# Patient Record
Sex: FEMALE | Race: WHITE | Employment: OTHER | URBAN - METROPOLITAN AREA
[De-identification: names, ages, dates, MRNs, and addresses within clinical notes are randomized per-mention and may not be internally consistent; named-entity substitution may affect disease eponyms.]

---

## 2017-09-17 PROBLEM — E03.9 HYPOTHYROIDISM: Chronic | Status: ACTIVE | Noted: 2017-09-17

## 2017-09-17 PROBLEM — I10 HTN (HYPERTENSION): Chronic | Status: ACTIVE | Noted: 2017-09-17

## 2017-09-17 PROBLEM — R79.89 ELEVATED TROPONIN: Status: ACTIVE | Noted: 2017-09-17

## 2017-09-17 PROBLEM — R77.8 ELEVATED TROPONIN: Status: ACTIVE | Noted: 2017-09-17

## 2017-09-17 PROBLEM — R79.89 ELEVATED BRAIN NATRIURETIC PEPTIDE (BNP) LEVEL: Status: ACTIVE | Noted: 2017-09-17

## 2017-09-17 PROBLEM — R50.9 FEVER: Status: ACTIVE | Noted: 2017-09-17

## 2017-09-17 PROBLEM — R10.811 RUQ ABDOMINAL TENDERNESS: Status: ACTIVE | Noted: 2017-09-17

## 2017-09-17 PROBLEM — R65.10 SIRS (SYSTEMIC INFLAMMATORY RESPONSE SYNDROME) (HCC): Status: ACTIVE | Noted: 2017-09-17

## 2017-09-17 PROBLEM — R11.2 NAUSEA AND VOMITING: Status: ACTIVE | Noted: 2017-09-17

## 2017-10-25 PROBLEM — I50.42 CHRONIC COMBINED SYSTOLIC AND DIASTOLIC HF (HEART FAILURE) (HCC): Status: ACTIVE | Noted: 2017-10-25

## 2017-10-25 PROBLEM — I42.8 NONISCHEMIC CARDIOMYOPATHY (HCC): Status: ACTIVE | Noted: 2017-10-25

## 2018-02-05 PROBLEM — E66.8 MODERATE OBESITY: Status: ACTIVE | Noted: 2018-02-05

## 2018-02-05 PROBLEM — E66.9 MODERATE OBESITY: Status: ACTIVE | Noted: 2018-02-05

## 2018-03-14 ENCOUNTER — HOSPITAL ENCOUNTER (OUTPATIENT)
Dept: CARDIOLOGY | Age: 78
Discharge: HOME | End: 2018-03-14
Admitting: INTERNAL MEDICINE
Payer: MEDICARE

## 2018-03-14 LAB
LV EF: 55 %
LVEF MODALITY: NORMAL

## 2018-03-14 PROCEDURE — 93306 TTE W/DOPPLER COMPLETE: CPT

## 2018-03-15 ENCOUNTER — TELEPHONE (OUTPATIENT)
Dept: CARDIOLOGY CLINIC | Age: 78
End: 2018-03-15

## 2018-03-23 ENCOUNTER — HOSPITAL ENCOUNTER (EMERGENCY)
Age: 78
Discharge: HOME | End: 2018-03-24
Attending: EMERGENCY MEDICINE
Payer: MEDICARE

## 2018-03-23 VITALS
RESPIRATION RATE: 18 BRPM | HEART RATE: 86 BPM | DIASTOLIC BLOOD PRESSURE: 104 MMHG | OXYGEN SATURATION: 97 % | SYSTOLIC BLOOD PRESSURE: 150 MMHG | TEMPERATURE: 98.2 F

## 2018-03-23 DIAGNOSIS — R10.30 LOWER ABDOMINAL PAIN: Primary | ICD-10-CM

## 2018-03-23 PROCEDURE — 6360000002 HC RX W HCPCS: Performed by: EMERGENCY MEDICINE

## 2018-03-23 PROCEDURE — 99283 EMERGENCY DEPT VISIT LOW MDM: CPT

## 2018-03-23 PROCEDURE — 96372 THER/PROPH/DIAG INJ SC/IM: CPT

## 2018-03-23 RX ORDER — MORPHINE SULFATE 2 MG/ML
2 INJECTION, SOLUTION INTRAMUSCULAR; INTRAVENOUS ONCE
Status: COMPLETED | OUTPATIENT
Start: 2018-03-23 | End: 2018-03-23

## 2018-03-23 RX ORDER — OXYCODONE HYDROCHLORIDE AND ACETAMINOPHEN 5; 325 MG/1; MG/1
1 TABLET ORAL EVERY 6 HOURS PRN
Qty: 5 TABLET | Refills: 0 | Status: SHIPPED | OUTPATIENT
Start: 2018-03-23 | End: 2018-03-26

## 2018-03-23 RX ORDER — ONDANSETRON 4 MG/1
4 TABLET, FILM COATED ORAL EVERY 8 HOURS PRN
Qty: 12 TABLET | Refills: 0 | Status: SHIPPED | OUTPATIENT
Start: 2018-03-23 | End: 2018-03-28

## 2018-03-23 RX ADMIN — MORPHINE SULFATE 2 MG: 2 INJECTION, SOLUTION INTRAMUSCULAR; INTRAVENOUS at 23:03

## 2018-03-23 ASSESSMENT — PAIN SCALES - GENERAL
PAINLEVEL_OUTOF10: 9
PAINLEVEL_OUTOF10: 9
PAINLEVEL_OUTOF10: 2

## 2018-03-23 ASSESSMENT — PAIN DESCRIPTION - DESCRIPTORS: DESCRIPTORS: PRESSURE

## 2018-03-23 ASSESSMENT — PAIN DESCRIPTION - LOCATION: LOCATION: PELVIS

## 2018-03-23 ASSESSMENT — PAIN DESCRIPTION - FREQUENCY: FREQUENCY: CONTINUOUS

## 2018-03-23 ASSESSMENT — PAIN DESCRIPTION - PAIN TYPE: TYPE: ACUTE PAIN

## 2018-03-24 NOTE — ED NOTES
Bed: 11  Expected date:   Expected time:   Means of arrival:   Comments:  Marti Izquierdoo sotero tai hold       Katharine Cortes RN  03/23/18 [id number]

## 2018-03-24 NOTE — ED PROVIDER NOTES
HPI:   Emma Owens is a 76 y.o. female presenting to the ED for post op problem, beginning 0930 am.  The complaint has been intermittent, moderate in severity, and worsened by nothing. Pt states she had a bladder sling put on for stress incontinence. Pt reports she was at home and tried to pull herself off of a low couch using a rocking motion with her body and she felt a pull on her bladder area. Pt denies fever, chills, nausea, vomiting, diarrhea, LOC, SOB, chest pain, abdominal pain, cough. ROS:   Pertinent positives and negatives are stated within HPI, all other systems reviewed and are negative.    --------------------------------------------- PAST HISTORY ---------------------------------------------  Past Medical History:  has a past medical history of History of TIA (transient ischemic attack); Hypertension; and Thyroid disease. Past Surgical History:  has a past surgical history that includes Thyroid lobectomy and knee surgery. Social History:  reports that she quit smoking about 11 years ago. Her smoking use included Cigarettes. She has a 40.00 pack-year smoking history. She has never used smokeless tobacco. She reports that she drinks alcohol. She reports that she does not use drugs. Family History: family history is not on file. The patients home medications have been reviewed. Allergies: Macrobid [nitrofurantoin monohyd macro]; Macrobid [nitrofurantoin]; Ciprofloxacin; Iodine; and Penicillins    -------------------------------------------------- RESULTS -------------------------------------------------  All laboratory and radiology results have been personally reviewed by myself   LABS:  No results found for this visit on 03/23/18. RADIOLOGY:  Interpreted by Radiologist.  No orders to display       ------------------------- NURSING NOTES AND VITALS REVIEWED ---------------------------   The nursing notes within the ED encounter and vital signs as below have been reviewed. /85   Pulse 85   Temp 98.2 °F (36.8 °C) (Oral)   Resp 18   SpO2 90%   Oxygen Saturation Interpretation: Abnormal    ---------------------------------------------------PHYSICAL EXAM--------------------------------------    Constitutional/General: Alert and oriented x3, well appearing, non toxic in NAD  Head: NC/AT  Eyes: PERRL, EOMI  Mouth: Oropharynx clear, handling secretions, no trismus  Neck: Supple, full ROM,   Bladder: 2 laparoscopic incisions in the suprapubic area, no redness or drainage. Pulmonary: Lungs clear to auscultation bilaterally, no wheezes, rales, or rhonchi. Not in respiratory distress  Cardiovascular:  Regular rate and rhythm, no murmurs, gallops, or rubs. 2+ distal pulses  Abdomen: Soft, non tender, non distended,   Extremities: Moves all extremities x 4. Warm and well perfused  Skin: warm and dry without rash  Neurologic: GCS 15,  Psych: Normal Affect      ------------------------------ ED COURSE/MEDICAL DECISION MAKING----------------------  Medications - No data to display    Medical Decision Making:    Pt presents to ED for post op problem. Pt will see Dr. Sharonda Woodruff in a follow up we consulted him and advised no labs or imaging needed. Counseling: The emergency provider has spoken with the patient and discussed todays results, in addition to providing specific details for the plan of care and counseling regarding the diagnosis and prognosis. Questions are answered at this time and they are agreeable with the plan. Time: 11:35PM.   Spoke with Dr. Zion Montemayor (Urology). Discussed case. They will see this patient in follow-up.    --------------------------------- IMPRESSION AND DISPOSITION ---------------------------------    IMPRESSION  No diagnosis found.     DISPOSITION  Disposition: Discharge to home  Patient condition is fair    3/23/18, 10:21 PM.    This note is prepared by Luci Nash, acting as Scribe for Mireya Zeng MD.    Mireya Zeng MD:  The cass's documentation has been prepared under my direction and personally reviewed by me in its entirety. I confirm that the note above accurately reflects all work, treatment, procedures, and medical decision making performed by me.            Noe Salmeron MD  03/23/18 9901

## 2018-04-12 PROBLEM — R79.89 ELEVATED TROPONIN: Status: RESOLVED | Noted: 2017-09-17 | Resolved: 2018-04-12

## 2018-04-12 PROBLEM — R77.8 ELEVATED TROPONIN: Status: RESOLVED | Noted: 2017-09-17 | Resolved: 2018-04-12

## 2019-10-09 ENCOUNTER — TELEPHONE (OUTPATIENT)
Dept: ADMINISTRATIVE | Age: 79
End: 2019-10-09

## 2019-10-22 ENCOUNTER — OFFICE VISIT (OUTPATIENT)
Dept: CARDIOLOGY CLINIC | Age: 79
End: 2019-10-22
Payer: MEDICARE

## 2019-10-22 VITALS
HEIGHT: 64 IN | SYSTOLIC BLOOD PRESSURE: 90 MMHG | WEIGHT: 185.2 LBS | HEART RATE: 59 BPM | BODY MASS INDEX: 31.62 KG/M2 | RESPIRATION RATE: 16 BRPM | DIASTOLIC BLOOD PRESSURE: 60 MMHG

## 2019-10-22 DIAGNOSIS — E66.8 MODERATE OBESITY: ICD-10-CM

## 2019-10-22 DIAGNOSIS — I50.42 CHRONIC COMBINED SYSTOLIC AND DIASTOLIC HF (HEART FAILURE) (HCC): ICD-10-CM

## 2019-10-22 DIAGNOSIS — E78.00 PURE HYPERCHOLESTEROLEMIA: ICD-10-CM

## 2019-10-22 DIAGNOSIS — F09 COGNITIVE DYSFUNCTION: ICD-10-CM

## 2019-10-22 DIAGNOSIS — Z86.73 HISTORY OF TIA (TRANSIENT ISCHEMIC ATTACK): ICD-10-CM

## 2019-10-22 DIAGNOSIS — I42.8 NONISCHEMIC CARDIOMYOPATHY (HCC): Primary | ICD-10-CM

## 2019-10-22 PROCEDURE — 1123F ACP DISCUSS/DSCN MKR DOCD: CPT | Performed by: INTERNAL MEDICINE

## 2019-10-22 PROCEDURE — 99214 OFFICE O/P EST MOD 30 MIN: CPT | Performed by: INTERNAL MEDICINE

## 2019-10-22 PROCEDURE — G8400 PT W/DXA NO RESULTS DOC: HCPCS | Performed by: INTERNAL MEDICINE

## 2019-10-22 PROCEDURE — 93000 ELECTROCARDIOGRAM COMPLETE: CPT | Performed by: INTERNAL MEDICINE

## 2019-10-22 PROCEDURE — 1036F TOBACCO NON-USER: CPT | Performed by: INTERNAL MEDICINE

## 2019-10-22 PROCEDURE — G8417 CALC BMI ABV UP PARAM F/U: HCPCS | Performed by: INTERNAL MEDICINE

## 2019-10-22 PROCEDURE — G8427 DOCREV CUR MEDS BY ELIG CLIN: HCPCS | Performed by: INTERNAL MEDICINE

## 2019-10-22 PROCEDURE — 4040F PNEUMOC VAC/ADMIN/RCVD: CPT | Performed by: INTERNAL MEDICINE

## 2019-10-22 PROCEDURE — G8484 FLU IMMUNIZE NO ADMIN: HCPCS | Performed by: INTERNAL MEDICINE

## 2019-10-22 PROCEDURE — 1090F PRES/ABSN URINE INCON ASSESS: CPT | Performed by: INTERNAL MEDICINE

## 2019-10-22 RX ORDER — MULTIVIT-MIN/IRON/FOLIC ACID/K 18-600-40
CAPSULE ORAL DAILY
COMMUNITY

## 2019-10-22 RX ORDER — TOPIRAMATE 25 MG/1
25 TABLET ORAL DAILY
COMMUNITY

## 2019-10-22 RX ORDER — FUROSEMIDE 40 MG/1
40 TABLET ORAL DAILY PRN
COMMUNITY

## 2020-09-08 RX ORDER — METOPROLOL SUCCINATE 25 MG/1
25 TABLET, EXTENDED RELEASE ORAL DAILY
Qty: 90 TABLET | Refills: 3 | Status: SHIPPED | OUTPATIENT
Start: 2020-09-08

## 2020-09-12 ENCOUNTER — APPOINTMENT (OUTPATIENT)
Dept: CT IMAGING | Age: 80
DRG: 418 | End: 2020-09-12
Payer: MEDICARE

## 2020-09-12 ENCOUNTER — HOSPITAL ENCOUNTER (INPATIENT)
Age: 80
LOS: 5 days | Discharge: HOME | DRG: 418 | End: 2020-09-17
Attending: EMERGENCY MEDICINE | Admitting: SURGERY
Payer: MEDICARE

## 2020-09-12 PROBLEM — K80.50 CHOLEDOCHOLITHIASIS: Status: ACTIVE | Noted: 2020-09-12

## 2020-09-12 LAB
ALBUMIN SERPL-MCNC: 2.9 G/DL (ref 3.5–5.2)
ALP BLD-CCNC: 399 U/L (ref 35–104)
ALT SERPL-CCNC: 128 U/L (ref 0–32)
ANION GAP SERPL CALCULATED.3IONS-SCNC: 10 MMOL/L (ref 7–16)
AST SERPL-CCNC: 88 U/L (ref 0–31)
BACTERIA: ABNORMAL /HPF
BASOPHILS ABSOLUTE: 0.06 E9/L (ref 0–0.2)
BASOPHILS RELATIVE PERCENT: 0.3 % (ref 0–2)
BILIRUB SERPL-MCNC: 4 MG/DL (ref 0–1.2)
BILIRUBIN DIRECT: 3.6 MG/DL (ref 0–0.3)
BILIRUBIN URINE: ABNORMAL
BILIRUBIN, INDIRECT: 0.4 MG/DL (ref 0–1)
BLOOD, URINE: NEGATIVE
BUN BLDV-MCNC: 19 MG/DL (ref 8–23)
CALCIUM SERPL-MCNC: 9.2 MG/DL (ref 8.6–10.2)
CHLORIDE BLD-SCNC: 102 MMOL/L (ref 98–107)
CLARITY: ABNORMAL
CO2: 24 MMOL/L (ref 22–29)
COLOR: YELLOW
CREAT SERPL-MCNC: 1.4 MG/DL (ref 0.5–1)
EOSINOPHILS ABSOLUTE: 0.16 E9/L (ref 0.05–0.5)
EOSINOPHILS RELATIVE PERCENT: 0.9 % (ref 0–6)
EPITHELIAL CELLS, UA: ABNORMAL /HPF
GFR AFRICAN AMERICAN: 44
GFR NON-AFRICAN AMERICAN: 36 ML/MIN/1.73
GLUCOSE BLD-MCNC: 76 MG/DL (ref 74–99)
GLUCOSE URINE: NEGATIVE MG/DL
HCT VFR BLD CALC: 31.6 % (ref 34–48)
HEMOGLOBIN: 9.9 G/DL (ref 11.5–15.5)
IMMATURE GRANULOCYTES #: 0.12 E9/L
IMMATURE GRANULOCYTES %: 0.7 % (ref 0–5)
KETONES, URINE: NEGATIVE MG/DL
LACTIC ACID: 1.1 MMOL/L (ref 0.5–2.2)
LEUKOCYTE ESTERASE, URINE: NEGATIVE
LIPASE: >3000 U/L (ref 13–60)
LYMPHOCYTES ABSOLUTE: 1.44 E9/L (ref 1.5–4)
LYMPHOCYTES RELATIVE PERCENT: 8.1 % (ref 20–42)
MCH RBC QN AUTO: 25.8 PG (ref 26–35)
MCHC RBC AUTO-ENTMCNC: 31.3 % (ref 32–34.5)
MCV RBC AUTO: 82.3 FL (ref 80–99.9)
MONOCYTES ABSOLUTE: 1.48 E9/L (ref 0.1–0.95)
MONOCYTES RELATIVE PERCENT: 8.3 % (ref 2–12)
NEUTROPHILS ABSOLUTE: 14.54 E9/L (ref 1.8–7.3)
NEUTROPHILS RELATIVE PERCENT: 81.7 % (ref 43–80)
NITRITE, URINE: POSITIVE
PDW BLD-RTO: 16.2 FL (ref 11.5–15)
PH UA: 6 (ref 5–9)
PLATELET # BLD: 238 E9/L (ref 130–450)
PMV BLD AUTO: 11 FL (ref 7–12)
POTASSIUM REFLEX MAGNESIUM: 4.3 MMOL/L (ref 3.5–5)
PROTEIN UA: NEGATIVE MG/DL
RBC # BLD: 3.84 E12/L (ref 3.5–5.5)
RBC UA: ABNORMAL /HPF (ref 0–2)
SODIUM BLD-SCNC: 136 MMOL/L (ref 132–146)
SPECIFIC GRAVITY UA: 1.01 (ref 1–1.03)
TOTAL PROTEIN: 5.6 G/DL (ref 6.4–8.3)
TROPONIN: <0.01 NG/ML (ref 0–0.03)
UROBILINOGEN, URINE: 1 E.U./DL
WBC # BLD: 17.8 E9/L (ref 4.5–11.5)
WBC UA: ABNORMAL /HPF (ref 0–5)

## 2020-09-12 PROCEDURE — 96374 THER/PROPH/DIAG INJ IV PUSH: CPT

## 2020-09-12 PROCEDURE — 6360000002 HC RX W HCPCS: Performed by: SURGERY

## 2020-09-12 PROCEDURE — 74176 CT ABD & PELVIS W/O CONTRAST: CPT

## 2020-09-12 PROCEDURE — 83690 ASSAY OF LIPASE: CPT

## 2020-09-12 PROCEDURE — 2500000003 HC RX 250 WO HCPCS: Performed by: EMERGENCY MEDICINE

## 2020-09-12 PROCEDURE — 80048 BASIC METABOLIC PNL TOTAL CA: CPT

## 2020-09-12 PROCEDURE — 6370000000 HC RX 637 (ALT 250 FOR IP): Performed by: EMERGENCY MEDICINE

## 2020-09-12 PROCEDURE — 83605 ASSAY OF LACTIC ACID: CPT

## 2020-09-12 PROCEDURE — 36415 COLL VENOUS BLD VENIPUNCTURE: CPT

## 2020-09-12 PROCEDURE — 99283 EMERGENCY DEPT VISIT LOW MDM: CPT

## 2020-09-12 PROCEDURE — 80076 HEPATIC FUNCTION PANEL: CPT

## 2020-09-12 PROCEDURE — 6360000002 HC RX W HCPCS: Performed by: EMERGENCY MEDICINE

## 2020-09-12 PROCEDURE — 99284 EMERGENCY DEPT VISIT MOD MDM: CPT

## 2020-09-12 PROCEDURE — 81001 URINALYSIS AUTO W/SCOPE: CPT

## 2020-09-12 PROCEDURE — 2500000003 HC RX 250 WO HCPCS: Performed by: SURGERY

## 2020-09-12 PROCEDURE — 1200000000 HC SEMI PRIVATE

## 2020-09-12 PROCEDURE — 2580000003 HC RX 258: Performed by: EMERGENCY MEDICINE

## 2020-09-12 PROCEDURE — 84484 ASSAY OF TROPONIN QUANT: CPT

## 2020-09-12 PROCEDURE — 85025 COMPLETE CBC W/AUTO DIFF WBC: CPT

## 2020-09-12 PROCEDURE — 93005 ELECTROCARDIOGRAM TRACING: CPT | Performed by: EMERGENCY MEDICINE

## 2020-09-12 PROCEDURE — 2580000003 HC RX 258: Performed by: SURGERY

## 2020-09-12 RX ORDER — SODIUM CHLORIDE, SODIUM LACTATE, POTASSIUM CHLORIDE, CALCIUM CHLORIDE 600; 310; 30; 20 MG/100ML; MG/100ML; MG/100ML; MG/100ML
1000 INJECTION, SOLUTION INTRAVENOUS ONCE
Status: COMPLETED | OUTPATIENT
Start: 2020-09-12 | End: 2020-09-12

## 2020-09-12 RX ORDER — MORPHINE SULFATE 2 MG/ML
2 INJECTION, SOLUTION INTRAMUSCULAR; INTRAVENOUS
Status: DISCONTINUED | OUTPATIENT
Start: 2020-09-12 | End: 2020-09-17 | Stop reason: HOSPADM

## 2020-09-12 RX ORDER — LOSARTAN POTASSIUM 50 MG/1
50 TABLET ORAL DAILY
Status: DISCONTINUED | OUTPATIENT
Start: 2020-09-12 | End: 2020-09-17 | Stop reason: HOSPADM

## 2020-09-12 RX ORDER — KETOROLAC TROMETHAMINE 30 MG/ML
15 INJECTION, SOLUTION INTRAMUSCULAR; INTRAVENOUS EVERY 6 HOURS PRN
Status: DISPENSED | OUTPATIENT
Start: 2020-09-12 | End: 2020-09-15

## 2020-09-12 RX ORDER — SODIUM CHLORIDE, SODIUM LACTATE, POTASSIUM CHLORIDE, CALCIUM CHLORIDE 600; 310; 30; 20 MG/100ML; MG/100ML; MG/100ML; MG/100ML
INJECTION, SOLUTION INTRAVENOUS CONTINUOUS
Status: DISCONTINUED | OUTPATIENT
Start: 2020-09-12 | End: 2020-09-17

## 2020-09-12 RX ORDER — PANTOPRAZOLE SODIUM 40 MG/1
40 TABLET, DELAYED RELEASE ORAL DAILY
Status: DISCONTINUED | OUTPATIENT
Start: 2020-09-12 | End: 2020-09-17 | Stop reason: HOSPADM

## 2020-09-12 RX ORDER — TOPIRAMATE 25 MG/1
25 TABLET ORAL DAILY
Status: DISCONTINUED | OUTPATIENT
Start: 2020-09-12 | End: 2020-09-17 | Stop reason: HOSPADM

## 2020-09-12 RX ORDER — ONDANSETRON 2 MG/ML
4 INJECTION INTRAMUSCULAR; INTRAVENOUS EVERY 6 HOURS PRN
Status: DISCONTINUED | OUTPATIENT
Start: 2020-09-12 | End: 2020-09-14

## 2020-09-12 RX ORDER — MORPHINE SULFATE 4 MG/ML
4 INJECTION, SOLUTION INTRAMUSCULAR; INTRAVENOUS
Status: DISCONTINUED | OUTPATIENT
Start: 2020-09-12 | End: 2020-09-17 | Stop reason: HOSPADM

## 2020-09-12 RX ORDER — FUROSEMIDE 40 MG/1
40 TABLET ORAL DAILY
Status: DISCONTINUED | OUTPATIENT
Start: 2020-09-12 | End: 2020-09-17 | Stop reason: HOSPADM

## 2020-09-12 RX ORDER — LEVOTHYROXINE SODIUM 0.07 MG/1
75 TABLET ORAL DAILY
Status: DISCONTINUED | OUTPATIENT
Start: 2020-09-12 | End: 2020-09-17 | Stop reason: HOSPADM

## 2020-09-12 RX ORDER — METOPROLOL SUCCINATE 25 MG/1
25 TABLET, EXTENDED RELEASE ORAL DAILY
Status: DISCONTINUED | OUTPATIENT
Start: 2020-09-12 | End: 2020-09-17 | Stop reason: HOSPADM

## 2020-09-12 RX ORDER — ACETAMINOPHEN 325 MG/1
650 TABLET ORAL EVERY 4 HOURS PRN
Status: DISCONTINUED | OUTPATIENT
Start: 2020-09-12 | End: 2020-09-14

## 2020-09-12 RX ADMIN — LIDOCAINE HYDROCHLORIDE: 20 SOLUTION ORAL; TOPICAL at 13:27

## 2020-09-12 RX ADMIN — KETOROLAC TROMETHAMINE 15 MG: 30 INJECTION, SOLUTION INTRAMUSCULAR at 20:14

## 2020-09-12 RX ADMIN — CEFTRIAXONE 1 G: 1 INJECTION, POWDER, FOR SOLUTION INTRAMUSCULAR; INTRAVENOUS at 15:00

## 2020-09-12 RX ADMIN — SODIUM CHLORIDE, POTASSIUM CHLORIDE, SODIUM LACTATE AND CALCIUM CHLORIDE: 600; 310; 30; 20 INJECTION, SOLUTION INTRAVENOUS at 18:23

## 2020-09-12 RX ADMIN — METRONIDAZOLE 500 MG: 500 INJECTION, SOLUTION INTRAVENOUS at 15:00

## 2020-09-12 RX ADMIN — METRONIDAZOLE 500 MG: 500 INJECTION, SOLUTION INTRAVENOUS at 23:00

## 2020-09-12 RX ADMIN — SODIUM CHLORIDE, POTASSIUM CHLORIDE, SODIUM LACTATE AND CALCIUM CHLORIDE 1000 ML: 600; 310; 30; 20 INJECTION, SOLUTION INTRAVENOUS at 15:20

## 2020-09-12 RX ADMIN — WATER 1 G: 1 INJECTION INTRAMUSCULAR; INTRAVENOUS; SUBCUTANEOUS at 18:23

## 2020-09-12 ASSESSMENT — ENCOUNTER SYMPTOMS
WHEEZING: 0
NAUSEA: 1
TROUBLE SWALLOWING: 0
EYE REDNESS: 0
EYE PAIN: 0
ABDOMINAL PAIN: 1
SHORTNESS OF BREATH: 0
COUGH: 0
VOMITING: 1
DIARRHEA: 0
BACK PAIN: 0
EYE DISCHARGE: 0

## 2020-09-12 ASSESSMENT — PAIN DESCRIPTION - PAIN TYPE
TYPE: ACUTE PAIN
TYPE: ACUTE PAIN

## 2020-09-12 ASSESSMENT — PAIN - FUNCTIONAL ASSESSMENT: PAIN_FUNCTIONAL_ASSESSMENT: PREVENTS OR INTERFERES SOME ACTIVE ACTIVITIES AND ADLS

## 2020-09-12 ASSESSMENT — PAIN DESCRIPTION - DIRECTION: RADIATING_TOWARDS: BACK

## 2020-09-12 ASSESSMENT — PAIN DESCRIPTION - LOCATION
LOCATION: ABDOMEN;BACK
LOCATION: ABDOMEN

## 2020-09-12 ASSESSMENT — PAIN DESCRIPTION - ORIENTATION
ORIENTATION: LEFT;LOWER
ORIENTATION: MID

## 2020-09-12 ASSESSMENT — PAIN SCALES - GENERAL
PAINLEVEL_OUTOF10: 2
PAINLEVEL_OUTOF10: 5

## 2020-09-12 ASSESSMENT — PAIN DESCRIPTION - DESCRIPTORS
DESCRIPTORS: SHARP
DESCRIPTORS: ACHING;BURNING;DISCOMFORT

## 2020-09-12 NOTE — H&P
History and Physical      Chief Complaint: Epigastric pain    HPI  78 y.o. female who came to the emergency department today with 2 days of nausea and epigastric pain. Denies fever or chills. Noted by ED to have abnormal labs. CT scan obtained. Past Medical History:   Diagnosis Date    History of TIA (transient ischemic attack)     TIAs ~2002 noted on brain imaging done because pt was having imbalance    Hypertension     Thyroid disease        Past Surgical History:   Procedure Laterality Date    KNEE SURGERY      THYROID LOBECTOMY         Medications Prior to Admission:    Prior to Admission medications    Medication Sig Start Date End Date Taking?  Authorizing Provider   metoprolol succinate (TOPROL XL) 25 MG extended release tablet TAKE 1 TABLET BY MOUTH  DAILY 9/8/20   Sim Seen, MD   furosemide (LASIX) 40 MG tablet Take 40 mg by mouth daily    Historical Provider, MD   Cholecalciferol (VITAMIN D) 2000 units CAPS capsule Take by mouth daily    Historical Provider, MD   sertraline (ZOLOFT) 50 MG tablet Take 50 mg by mouth daily    Historical Provider, MD   topiramate (TOPAMAX) 25 MG tablet Take 25 mg by mouth daily    Historical Provider, MD   Potassium (POTASSIMIN PO) Take 20 mEq by mouth daily    Historical Provider, MD   atorvastatin (LIPITOR) 40 MG tablet TAKE 1 TABLET BY MOUTH  NIGHTLY 10/14/19   Feliz Gaston MD   Cyanocobalamin (B-12 PO) Take by mouth daily     Historical Provider, MD   CALCIUM-MAGNESIUM-ZINC PO Take by mouth daily    Historical Provider, MD   Cetirizine HCl (ZYRTEC PO) Take by mouth daily    Historical Provider, MD   Fluticasone Propionate (FLONASE NA) by Nasal route    Historical Provider, MD   levothyroxine (SYNTHROID) 75 MCG tablet Take 75 mcg by mouth daily  8/4/17   Historical Provider, MD   pantoprazole (PROTONIX) 40 MG tablet Take 40 mg by mouth daily  9/6/17   Historical Provider, MD   losartan (COZAAR) 50 MG tablet Take 1 tablet by mouth daily 9/19/17 Vinod Godfrey,    aspirin 81 MG tablet Take 81 mg by mouth daily    Historical Provider, MD   calcium-vitamin D (OSCAL) 250-125 MG-UNIT per tablet Take 1 tablet by mouth daily    Historical Provider, MD   traMADol (ULTRAM) 50 MG tablet Take 100 mg by mouth 3 times daily     Historical Provider, MD   ranitidine (ZANTAC) 150 MG tablet Take 150 mg by mouth nightly     Historical Provider, MD       Allergies   Allergen Reactions    Macrobid [Nitrofurantoin Monohyd Macro] Nausea Only and Rash    Macrobid [Nitrofurantoin] Anaphylaxis    Ciprofloxacin     Iodine      Only when injected    Penicillins        History reviewed. No pertinent family history. Social History     Tobacco Use    Smoking status: Former Smoker     Packs/day: 1.00     Years: 40.00     Pack years: 40.00     Types: Cigarettes     Last attempt to quit:      Years since quittin.7    Smokeless tobacco: Never Used   Substance Use Topics    Alcohol use: Yes     Comment: rare mixed drink;  drinks 1 cup of coffee and pop qd    Drug use: No         Review of Systems   General ROS: negative  Hematological and Lymphatic ROS: negative  Respiratory ROS: negative  Cardiovascular ROS: negative  Gastrointestinal ROS: negative  Genito-Urinary ROS: negative  Musculoskeletal ROS: negative      PHYSICAL EXAM:    Vitals:    20 1554   BP: 134/69   Pulse: 65   Resp: 16   Temp: 97.7 °F (36.5 °C)   SpO2: 99%       General Appearance:  awake, alert, oriented, in no acute distress  Skin: Mild jaundice  Head/face:  NCAT  Eyes: Mildly icteric sclera  Lungs:  Normal expansion. Clear to auscultation. No rales, rhonchi, or wheezing. Heart:  Heart sounds are normal.  Regular rate and rhythm without murmur, gallop or rub. Abdomen:  Soft, mild epigastric tenderness, normal bowel sounds. No bruits, organomegaly or masses. Extremities: Extremities warm to touch, pink, with no edema.       LABS:    CBC  Recent Labs     20  1254   WBC 17.8*   HGB 9.9*   HCT 31.6*        BMP  Recent Labs     20  1254      K 4.3      CO2 24   BUN 19   CREATININE 1.4*   CALCIUM 9.2     Liver Function  Recent Labs     20  1254   LIPASE >3,000*   BILITOT 4.0*   BILIDIR 3.6*   AST 88*   *   ALKPHOS 399*   PROT 5.6*   LABALBU 2.9*     No results for input(s): LACTATE in the last 72 hours. No results for input(s): INR, PTT in the last 72 hours. Invalid input(s): PT    RADIOLOGY    Ct Abdomen Pelvis Wo Contrast Additional Contrast? None    Result Date: 2020  Patient MRN:  [id number] : 1940 Age: 78 years Gender: Female Order Date:  2020 1:11 PM EXAM: CT ABDOMEN PELVIS WO CONTRAST November of images 399. Technique: Low-dose CT  acquisition technique included one of following options; 1 . Automated exposure control, 2. Adjustment of MA and or KV according to patient's size or 3. Use of iterative reconstruction. INDICATION:  epigastric pain epigastric pain COMPARISON: 2009 FINDINGS: The lung bases demonstrate minimal atelectasis/infiltrates in the right middle lobe. The liver is fatty infiltrated. The gallbladder is distended with wall thickening and multiple gallstones. The common bile duct is dilated measuring 1.1 cm with  multiple impacted gallstones in the distal common bile duct. Some gallstones are seen in the duodenum. Spleen, pancreas, and adrenals are normal. The kidneys are small and lobulated and atrophic. 40% compression deformity of L1 is noted with the diffuse degenerative changes in the lumbar spine. Pelvis. The bladder is normal. There is diverticulosis of the colon without diverticulitis. The appendix is normal.     Distended gallbladder with wall thickening edema and multiple gallstones with dilated common bile duct with multiple gallstones in the common bile duct concerning for acute cholecystitis. Minimal atelectasis/infiltrates in the right lung base. Developing pneumonia is considered. ASSESSMENT:  78 y.o. female with:  1.  gallstone pancreatitis  2. Multiple common bile duct stones  3.   Possible cholangitis    PLAN:  IV antibiotics  No need for additional imaging based on CT scan with multiple common bile duct stones  GI consult for ERCP  Laparoscopic cholecystectomy to follow ERCP    Electronically signed by Yumiko Yoon MD on 9/12/20 at 4:59 PM EDT

## 2020-09-12 NOTE — ED NOTES
RN faxed SBAR to floor. Called floor to confirm receipt. Spoke with Megan Santiago who confirmed.       Haydee Chew RN  09/12/20 [id number]

## 2020-09-13 ENCOUNTER — ANESTHESIA EVENT (OUTPATIENT)
Dept: ENDOSCOPY | Age: 80
DRG: 418 | End: 2020-09-13
Payer: MEDICARE

## 2020-09-13 ENCOUNTER — ANESTHESIA (OUTPATIENT)
Dept: ENDOSCOPY | Age: 80
DRG: 418 | End: 2020-09-13
Payer: MEDICARE

## 2020-09-13 LAB
ALBUMIN SERPL-MCNC: 2.8 G/DL (ref 3.5–5.2)
ALP BLD-CCNC: 378 U/L (ref 35–104)
ALT SERPL-CCNC: 108 U/L (ref 0–32)
ANION GAP SERPL CALCULATED.3IONS-SCNC: 9 MMOL/L (ref 7–16)
AST SERPL-CCNC: 77 U/L (ref 0–31)
BILIRUB SERPL-MCNC: 3 MG/DL (ref 0–1.2)
BUN BLDV-MCNC: 16 MG/DL (ref 8–23)
CALCIUM SERPL-MCNC: 9.3 MG/DL (ref 8.6–10.2)
CHLORIDE BLD-SCNC: 103 MMOL/L (ref 98–107)
CO2: 23 MMOL/L (ref 22–29)
CREAT SERPL-MCNC: 1.1 MG/DL (ref 0.5–1)
EKG ATRIAL RATE: 57 BPM
EKG P AXIS: 38 DEGREES
EKG P-R INTERVAL: 184 MS
EKG Q-T INTERVAL: 450 MS
EKG QRS DURATION: 92 MS
EKG QTC CALCULATION (BAZETT): 438 MS
EKG R AXIS: 3 DEGREES
EKG T AXIS: 15 DEGREES
EKG VENTRICULAR RATE: 57 BPM
GFR AFRICAN AMERICAN: 57
GFR NON-AFRICAN AMERICAN: 48 ML/MIN/1.73
GLUCOSE BLD-MCNC: 53 MG/DL (ref 74–99)
HCT VFR BLD CALC: 31.4 % (ref 34–48)
HEMOGLOBIN: 9.8 G/DL (ref 11.5–15.5)
LIPASE: >3000 U/L (ref 13–60)
MCH RBC QN AUTO: 25.9 PG (ref 26–35)
MCHC RBC AUTO-ENTMCNC: 31.2 % (ref 32–34.5)
MCV RBC AUTO: 83.1 FL (ref 80–99.9)
PDW BLD-RTO: 16.4 FL (ref 11.5–15)
PLATELET # BLD: 230 E9/L (ref 130–450)
PMV BLD AUTO: 10.9 FL (ref 7–12)
POTASSIUM SERPL-SCNC: 4.3 MMOL/L (ref 3.5–5)
RBC # BLD: 3.78 E12/L (ref 3.5–5.5)
SODIUM BLD-SCNC: 135 MMOL/L (ref 132–146)
TOTAL PROTEIN: 5.9 G/DL (ref 6.4–8.3)
WBC # BLD: 11 E9/L (ref 4.5–11.5)

## 2020-09-13 PROCEDURE — 80053 COMPREHEN METABOLIC PANEL: CPT

## 2020-09-13 PROCEDURE — 83690 ASSAY OF LIPASE: CPT

## 2020-09-13 PROCEDURE — 6370000000 HC RX 637 (ALT 250 FOR IP): Performed by: SURGERY

## 2020-09-13 PROCEDURE — 1200000000 HC SEMI PRIVATE

## 2020-09-13 PROCEDURE — 36415 COLL VENOUS BLD VENIPUNCTURE: CPT

## 2020-09-13 PROCEDURE — 2580000003 HC RX 258: Performed by: SURGERY

## 2020-09-13 PROCEDURE — 6360000002 HC RX W HCPCS: Performed by: SURGERY

## 2020-09-13 PROCEDURE — 93010 ELECTROCARDIOGRAM REPORT: CPT | Performed by: INTERNAL MEDICINE

## 2020-09-13 PROCEDURE — 85027 COMPLETE CBC AUTOMATED: CPT

## 2020-09-13 PROCEDURE — 2500000003 HC RX 250 WO HCPCS: Performed by: SURGERY

## 2020-09-13 RX ORDER — SODIUM CHLORIDE, SODIUM LACTATE, POTASSIUM CHLORIDE, AND CALCIUM CHLORIDE .6; .31; .03; .02 G/100ML; G/100ML; G/100ML; G/100ML
1000 INJECTION, SOLUTION INTRAVENOUS ONCE
Status: COMPLETED | OUTPATIENT
Start: 2020-09-15 | End: 2020-09-15

## 2020-09-13 RX ADMIN — PANTOPRAZOLE SODIUM 40 MG: 40 TABLET, DELAYED RELEASE ORAL at 10:01

## 2020-09-13 RX ADMIN — LOSARTAN POTASSIUM 50 MG: 50 TABLET, FILM COATED ORAL at 10:01

## 2020-09-13 RX ADMIN — SODIUM CHLORIDE, POTASSIUM CHLORIDE, SODIUM LACTATE AND CALCIUM CHLORIDE: 600; 310; 30; 20 INJECTION, SOLUTION INTRAVENOUS at 13:02

## 2020-09-13 RX ADMIN — KETOROLAC TROMETHAMINE 15 MG: 30 INJECTION, SOLUTION INTRAMUSCULAR at 13:02

## 2020-09-13 RX ADMIN — METOPROLOL SUCCINATE 25 MG: 25 TABLET, EXTENDED RELEASE ORAL at 10:01

## 2020-09-13 RX ADMIN — WATER 1 G: 1 INJECTION INTRAMUSCULAR; INTRAVENOUS; SUBCUTANEOUS at 17:50

## 2020-09-13 RX ADMIN — METRONIDAZOLE 500 MG: 500 INJECTION, SOLUTION INTRAVENOUS at 15:52

## 2020-09-13 RX ADMIN — METRONIDAZOLE 500 MG: 500 INJECTION, SOLUTION INTRAVENOUS at 06:37

## 2020-09-13 RX ADMIN — SERTRALINE HYDROCHLORIDE 50 MG: 50 TABLET ORAL at 10:04

## 2020-09-13 RX ADMIN — LEVOTHYROXINE SODIUM 75 MCG: 75 TABLET ORAL at 10:01

## 2020-09-13 RX ADMIN — KETOROLAC TROMETHAMINE 15 MG: 30 INJECTION, SOLUTION INTRAMUSCULAR at 06:03

## 2020-09-13 RX ADMIN — MORPHINE SULFATE 2 MG: 2 INJECTION, SOLUTION INTRAMUSCULAR; INTRAVENOUS at 17:46

## 2020-09-13 ASSESSMENT — PAIN SCALES - GENERAL
PAINLEVEL_OUTOF10: 3
PAINLEVEL_OUTOF10: 0
PAINLEVEL_OUTOF10: 6
PAINLEVEL_OUTOF10: 0
PAINLEVEL_OUTOF10: 5
PAINLEVEL_OUTOF10: 4

## 2020-09-13 ASSESSMENT — PAIN DESCRIPTION - DESCRIPTORS
DESCRIPTORS: ACHING;CONSTANT;DISCOMFORT;SHARP
DESCRIPTORS: STABBING

## 2020-09-13 ASSESSMENT — LIFESTYLE VARIABLES: SMOKING_STATUS: 0

## 2020-09-13 ASSESSMENT — PAIN DESCRIPTION - LOCATION
LOCATION: ABDOMEN;BACK
LOCATION: ABDOMEN

## 2020-09-13 ASSESSMENT — PAIN DESCRIPTION - ORIENTATION
ORIENTATION: LEFT;UPPER
ORIENTATION: LEFT;UPPER

## 2020-09-13 ASSESSMENT — PAIN DESCRIPTION - PAIN TYPE
TYPE: ACUTE PAIN
TYPE: ACUTE PAIN

## 2020-09-13 NOTE — CONSULTS
Consult note      Asked for medical management by surgical services who admitted for management of gallstone pancreatitis    History of Present Illness: This is an 61-year-old female with a past history of chronic urinary incontinence and use of antibiotics. She also has a history of hypertension and hyperlipidemia and thyroid disease and cerebral artery occlusion with TIA  Patient was admitted to the ER yesterday with abdominal pain and extreme nausea and vomiting. Her symptoms were fairly severe the pain was sharp and stabbing and pain was worsened after eating. She was checked in the emergency room with CAT scan which showed distended gallbladder with thickened edema and multiple gallstones with dilated common bile duct with multiple stones in it. Findings were concerning for acute cholecystitis.   She was admitted to surgical services and medical service was asked to manage for medical issues  Past Medical History:   Diagnosis Date    History of TIA (transient ischemic attack)     TIAs ~2002 noted on brain imaging done because pt was having imbalance    Hypertension     Thyroid disease          Past Surgical History:   Procedure Laterality Date    KNEE SURGERY      THYROID LOBECTOMY         Medications Prior to Admission:    Medications Prior to Admission: metoprolol succinate (TOPROL XL) 25 MG extended release tablet, TAKE 1 TABLET BY MOUTH  DAILY  furosemide (LASIX) 40 MG tablet, Take 40 mg by mouth daily  Cholecalciferol (VITAMIN D) 2000 units CAPS capsule, Take by mouth daily  topiramate (TOPAMAX) 25 MG tablet, Take 25 mg by mouth daily  Potassium (POTASSIMIN PO), Take 20 mEq by mouth daily  atorvastatin (LIPITOR) 40 MG tablet, TAKE 1 TABLET BY MOUTH  NIGHTLY  Cyanocobalamin (B-12 PO), Take by mouth daily   CALCIUM-MAGNESIUM-ZINC PO, Take by mouth daily  Cetirizine HCl (ZYRTEC PO), Take by mouth daily  Fluticasone Propionate (FLONASE NA), by Nasal route  levothyroxine (SYNTHROID) 75 MCG tablet, Take 75 mcg by mouth daily   pantoprazole (PROTONIX) 40 MG tablet, Take 40 mg by mouth daily   losartan (COZAAR) 50 MG tablet, Take 1 tablet by mouth daily  aspirin 81 MG tablet, Take 81 mg by mouth daily  calcium-vitamin D (OSCAL) 250-125 MG-UNIT per tablet, Take 1 tablet by mouth daily  traMADol (ULTRAM) 50 MG tablet, Take 100 mg by mouth 3 times daily   ranitidine (ZANTAC) 150 MG tablet, Take 150 mg by mouth nightly   [DISCONTINUED] sertraline (ZOLOFT) 50 MG tablet, Take 50 mg by mouth daily    Allergies:    Macrobid [nitrofurantoin monohyd macro]; Macrobid [nitrofurantoin]; Ciprofloxacin; Iodine; and Penicillins    Social History:    reports that she quit smoking about 13 years ago. Her smoking use included cigarettes. She has a 40.00 pack-year smoking history. She has never used smokeless tobacco. She reports current alcohol use. She reports that she does not use drugs. Family History:   family history is not on file. REVIEW OF SYSTEMS:  Gen: Positive for vomiting abdominal pain nausea  HEENT: Negative for double vision, blurred vision, sore throat   Heart: Negative for acute chest pain but positive for history of hypertension  Lungs: Negative for wheezes, asthma or SOB  GI: Negative for melena or hematemesis however she did have bowel habits with change of color  : Negative for dysuria, hematuria  Endo: Negative for polyphagia polydipsia but positive for history of thyroid disease  Heme: Negative for DVT or bleeding disorders  Psych: Negative for Depression or anxiety  Ortho: Negative for arthralgias    PHYSICAL EXAM:    Vitals:  BP (!) 116/56   Pulse 78   Temp 98.2 °F (36.8 °C) (Oral)   Resp 16   Ht 5' 4.5\" (1.638 m)   Wt 180 lb (81.6 kg)   SpO2 98%   BMI 30.42 kg/m²     General:  Awake, alert, oriented X 3. Well developed, well nourished, well groomed. No apparent distress. HEENT:  Normocephalic, atraumatic. Pupils equal, round, reactive to light. No scleral icterus.   No conjunctival injection. Normal lips, teeth, and gums. No nasal discharge. Neck:  Supple  Heart:  RRR, no murmurs, gallops, or rubs  Lungs:  CTA bilaterally, bilat symmetrical expansion, no wheeze, rales, or rhonchi  Abdomen:   Bowel sounds present, soft, nontender, no masses, no organomegaly, no peritoneal signs positive for epigastric tenderness  Extremities:  No clubbing, cyanosis, or edema  Skin:  Warm and dry, no open lesions or rash  Neuro:  Cranial nerves 2-12 intact, no focal deficits  Breast: deferred  Rectal: deferred  Genitalia:  deferred    LABS:  CBC:   Lab Results   Component Value Date    WBC 11.0 09/13/2020    RBC 3.78 09/13/2020    HGB 9.8 09/13/2020    HCT 31.4 09/13/2020    MCV 83.1 09/13/2020    MCH 25.9 09/13/2020    MCHC 31.2 09/13/2020    RDW 16.4 09/13/2020     09/13/2020    MPV 10.9 09/13/2020     CMP:    Lab Results   Component Value Date     09/13/2020    K 4.3 09/13/2020    K 4.3 09/12/2020     09/13/2020    CO2 23 09/13/2020    BUN 16 09/13/2020    CREATININE 1.1 09/13/2020    GFRAA 57 09/13/2020    LABGLOM 48 09/13/2020    GLUCOSE 53 09/13/2020    PROT 5.9 09/13/2020    LABALBU 2.8 09/13/2020    CALCIUM 9.3 09/13/2020    BILITOT 3.0 09/13/2020    ALKPHOS 378 09/13/2020    AST 77 09/13/2020     09/13/2020     Sodium:    Lab Results   Component Value Date     09/13/2020     Potassium:    Lab Results   Component Value Date    K 4.3 09/13/2020    K 4.3 09/12/2020     BUN/Creatinine:    Lab Results   Component Value Date    BUN 16 09/13/2020    CREATININE 1.1 09/13/2020     Hepatic Function Panel:    Lab Results   Component Value Date    ALKPHOS 378 09/13/2020     09/13/2020    AST 77 09/13/2020    PROT 5.9 09/13/2020    BILITOT 3.0 09/13/2020    BILIDIR 3.6 09/12/2020    IBILI 0.4 09/12/2020    LABALBU 2.8 09/13/2020     Albumin:    Lab Results   Component Value Date    LABALBU 2.8 09/13/2020   Ct Abdomen Pelvis Wo Contrast Additional Contrast? None    Result review of records and results, discussion with staff/family, etc.      Electronically signed by Kolby Velez MD on 9/13/2020 at 1:21 PM

## 2020-09-13 NOTE — ED PROVIDER NOTES
Patient is an 68-year-old female with medical history of hypertension, TIA, CHF, hyperlipidemia presenting to the emergency department with abdominal pain. Symptoms moderate in severity and intermittent since onset. Patient states right upper quadrant and epigastric abdominal pain starting 2 days prior. She describes as a sharp stabbing pain. There is bouts of the pain is more controlled and then bouts of intermittent sharpness. She states the pain is worsened after eating. She had similar pain to this last week as well as a few weeks ago. She was never evaluated this at this time. She states she has been nauseous with intermittent episodes of vomiting. Emesis is nonbloody, nonbilious. She has no episodes of vomiting today. Denies any fevers or chills. She denies any urinary complaints. Denies any flank pain, chest pain or shortness of breath. She is tried ibuprofen and Tylenol at home with moderate relief of her symptoms. The pain does not radiate anywhere and is localized to her abdomen. She has followed with gastroenterology in the past for abdominal pain. She states she is on some medication for abdominal pain but cannot remember this. Her GI physician is Dr. Vicky Castano. Review of Systems   Constitutional: Negative for chills and fever. HENT: Negative for trouble swallowing. Eyes: Negative for pain, discharge and redness. Respiratory: Negative for cough, shortness of breath and wheezing. Cardiovascular: Negative for chest pain and leg swelling. Gastrointestinal: Positive for abdominal pain, nausea and vomiting. Negative for diarrhea. Genitourinary: Negative for dysuria, flank pain and frequency. Musculoskeletal: Negative for arthralgias and back pain. Skin: Negative for rash and wound. Neurological: Negative for weakness and headaches. All other systems reviewed and are negative. Physical Exam  Vitals signs and nursing note reviewed.    Constitutional: General: She is not in acute distress. Appearance: She is well-developed. She is not ill-appearing. HENT:      Head: Normocephalic and atraumatic. Eyes:      Pupils: Pupils are equal, round, and reactive to light. Neck:      Musculoskeletal: Normal range of motion and neck supple. Cardiovascular:      Rate and Rhythm: Normal rate and regular rhythm. Heart sounds: Normal heart sounds. Pulmonary:      Effort: Pulmonary effort is normal. No respiratory distress. Breath sounds: Normal breath sounds. No wheezing or rales. Abdominal:      General: Bowel sounds are normal.      Palpations: Abdomen is soft. Tenderness: There is abdominal tenderness in the right upper quadrant and epigastric area. There is no right CVA tenderness, left CVA tenderness, guarding or rebound. Negative signs include Bueno's sign and McBurney's sign. Skin:     General: Skin is warm and dry. Capillary Refill: Capillary refill takes less than 2 seconds. Neurological:      General: No focal deficit present. Mental Status: She is alert and oriented to person, place, and time. Coordination: Coordination normal.          Procedures     MDM  Number of Diagnoses or Management Options  Acute pancreatitis, unspecified complication status, unspecified pancreatitis type:   Choledocholithiasis:   Transaminitis:   Urinary tract infection without hematuria, site unspecified:   Patient presents emergency department with abdominal pain and nausea. Abdominal exam with no peritonitis, no rebound or guarding. She has tenderness palpation epigastric and right upper quadrant but no Bueno sign. Initial history and physical concerning for peptic ulcer disease. Initial department diagnosis included but was not limited to peptic ulcer disease, cholecystitis, ascending cholangitis, ACS, pancreatitis, gastritis, appendicitis. Lab work and imaging studies initiated.   Patient was found to have a mild increase in her creatinine at 1.4. She was given IV fluids. Abdominal labs resulted with a transaminitis and elevation in her alk phos. She has an elevated bilirubin of 4.0 total.  Lipase was found to greater than 3000 concerning for acute pancreatitis. CT scan demonstrated acute cholecystitis with multiple stones in the common bile duct. Patient's pancreatitis most likely secondary to her choledocholithiasis. She does have an elevated white count of 17.8. Urine demonstrated UTI that was nitrite positive. Patient was started on Rocephin and Flagyl for UTI and acute cholecystitis/choledocholithiasis. Patient is not obviously jaundice, no altered mental status making acute ascending cholangitis less likely. With the epigastric pain a cardiac work-up was also performed. EKG with no ischemic changes and troponin was less than 0.01. Patient was offered pain medication multiple times not to stay but states she did not need any. Consult placed on-call general surgery to discuss case and will admit the patient. Educated patient and daughter about symptoms, diagnosis and need to stay in hospital for evaluation and care. They verbalized understanding were agreeable to plan. All questions were answered. Patient was admitted.            ----------------------------------------------- PAST HISTORY --------------------------------------------  Past Medical History:  has a past medical history of History of TIA (transient ischemic attack), Hypertension, and Thyroid disease. Past Surgical History:  has a past surgical history that includes Thyroid lobectomy and knee surgery. Social History:  reports that she quit smoking about 13 years ago. Her smoking use included cigarettes. She has a 40.00 pack-year smoking history. She has never used smokeless tobacco. She reports current alcohol use. She reports that she does not use drugs. Family History: family history is not on file.      The patients home medications have been reviewed. Allergies: Macrobid [nitrofurantoin monohyd macro]; Macrobid [nitrofurantoin]; Ciprofloxacin;  Iodine; and Penicillins    ------------------------------------------------ RESULTS ---------------------------------------------------    LABS:  Results for orders placed or performed during the hospital encounter of 09/12/20   CBC Auto Differential   Result Value Ref Range    WBC 17.8 (H) 4.5 - 11.5 E9/L    RBC 3.84 3.50 - 5.50 E12/L    Hemoglobin 9.9 (L) 11.5 - 15.5 g/dL    Hematocrit 31.6 (L) 34.0 - 48.0 %    MCV 82.3 80.0 - 99.9 fL    MCH 25.8 (L) 26.0 - 35.0 pg    MCHC 31.3 (L) 32.0 - 34.5 %    RDW 16.2 (H) 11.5 - 15.0 fL    Platelets 586 948 - 458 E9/L    MPV 11.0 7.0 - 12.0 fL    Neutrophils % 81.7 (H) 43.0 - 80.0 %    Immature Granulocytes % 0.7 0.0 - 5.0 %    Lymphocytes % 8.1 (L) 20.0 - 42.0 %    Monocytes % 8.3 2.0 - 12.0 %    Eosinophils % 0.9 0.0 - 6.0 %    Basophils % 0.3 0.0 - 2.0 %    Neutrophils Absolute 14.54 (H) 1.80 - 7.30 E9/L    Immature Granulocytes # 0.12 E9/L    Lymphocytes Absolute 1.44 (L) 1.50 - 4.00 E9/L    Monocytes Absolute 1.48 (H) 0.10 - 0.95 E9/L    Eosinophils Absolute 0.16 0.05 - 0.50 E9/L    Basophils Absolute 0.06 0.00 - 0.20 B9/C   Basic Metabolic Panel w/ Reflex to MG   Result Value Ref Range    Sodium 136 132 - 146 mmol/L    Potassium reflex Magnesium 4.3 3.5 - 5.0 mmol/L    Chloride 102 98 - 107 mmol/L    CO2 24 22 - 29 mmol/L    Anion Gap 10 7 - 16 mmol/L    Glucose 76 74 - 99 mg/dL    BUN 19 8 - 23 mg/dL    CREATININE 1.4 (H) 0.5 - 1.0 mg/dL    GFR Non-African American 36 >=60 mL/min/1.73    GFR African American 44     Calcium 9.2 8.6 - 10.2 mg/dL   Hepatic Function Panel   Result Value Ref Range    Total Protein 5.6 (L) 6.4 - 8.3 g/dL    Alb 2.9 (L) 3.5 - 5.2 g/dL    Alkaline Phosphatase 399 (H) 35 - 104 U/L     (H) 0 - 32 U/L    AST 88 (H) 0 - 31 U/L    Total Bilirubin 4.0 (H) 0.0 - 1.2 mg/dL    Bilirubin, Direct 3.6 (H) 0.0 - 0.3 mg/dL    Bilirubin, Indirect 0.4 0.0 - 1.0 mg/dL   Lipase   Result Value Ref Range    Lipase >3,000 (H) 13 - 60 U/L   Troponin   Result Value Ref Range    Troponin <0.01 0.00 - 0.03 ng/mL   Lactic Acid, Plasma   Result Value Ref Range    Lactic Acid 1.1 0.5 - 2.2 mmol/L   Urinalysis, reflex to microscopic   Result Value Ref Range    Color, UA Yellow Straw/Yellow    Clarity, UA SL CLOUDY Clear    Glucose, Ur Negative Negative mg/dL    Bilirubin Urine MODERATE (A) Negative    Ketones, Urine Negative Negative mg/dL    Specific Gravity, UA 1.015 1.005 - 1.030    Blood, Urine Negative Negative    pH, UA 6.0 5.0 - 9.0    Protein, UA Negative Negative mg/dL    Urobilinogen, Urine 1.0 <2.0 E.U./dL    Nitrite, Urine POSITIVE (A) Negative    Leukocyte Esterase, Urine Negative Negative   Microscopic Urinalysis   Result Value Ref Range    WBC, UA 2-5 0 - 5 /HPF    RBC, UA NONE 0 - 2 /HPF    Epithelial Cells, UA MODERATE /HPF    Bacteria, UA MODERATE (A) None Seen /HPF   EKG 12 Lead   Result Value Ref Range    Ventricular Rate 57 BPM    Atrial Rate 57 BPM    P-R Interval 184 ms    QRS Duration 92 ms    Q-T Interval 450 ms    QTc Calculation (Bazett) 438 ms    P Axis 38 degrees    R Axis 3 degrees    T Axis 15 degrees       RADIOLOGY:    All Radiology results interpreted by Radiologist unless otherwise noted. CT ABDOMEN PELVIS WO CONTRAST Additional Contrast? None   Final Result   Distended gallbladder with wall thickening edema and multiple   gallstones with dilated common bile duct with multiple gallstones in   the common bile duct concerning for acute cholecystitis. Minimal atelectasis/infiltrates in the right lung base. Developing   pneumonia is considered. EKG:  This EKG is signed and interpreted by ED Physician. Time:  1256   Rate: 57  Rhythm: Sinus. Interpretation: sinus bradycardia. QTc 438. SC interval 184. Normal axis deviation. No ST segment changes.   Comparison: no previous EKG.    ---------------------------- (ROCEPHIN) 1 g in sterile water 10 mL IV syringe (0 g Intravenous Stopped 9/12/20 1503)   lactated ringers infusion 1,000 mL (0 mLs Intravenous Stopped 9/12/20 1630)       CONSULTATIONS:            Consultation:  I Spoke with Dr. Farheen Jamil (Surgery). Discussed case. They will admit this patient. Keshav Denton PROCEDURES:            none. COUNSELING:   I have spoken with the daughter and patient and discussed todays results, in addition to providing specific details for the plan of care and counseling regarding the diagnosis and prognosis.     --------------------------------------- IMPRESSION & DISPOSITION --------------------------------     IMPRESSION(s):  1. Choledocholithiasis    2. Acute pancreatitis, unspecified complication status, unspecified pancreatitis type    3. Transaminitis    4. Urinary tract infection without hematuria, site unspecified        This patient's ED course included: a personal history and physicial examination and re-evaluation prior to disposition    This patient has remained hemodynamically stable and been closely monitored during their ED course. DISPOSITION:  Disposition: Admit to med/surg floor. Patient condition is stable. END OF PROVIDER NOTE.         RICKY Holland DO  Resident  09/12/20 [id number]

## 2020-09-13 NOTE — PROGRESS NOTES
elevated    Physical Exam:    Abdomen:    soft and non distended.    Non-tender    Impression:  CBD stone with pancreatitis and cholangitis    Plan:  Continue abx  Continue NPO  GI consult pending      Electronically by Julieta Hilton MD on 9/13/2020 at 7:28 AM

## 2020-09-13 NOTE — ANESTHESIA PRE PROCEDURE
mouth nightly    Yes Historical Provider, MD       Current medications:    Current Facility-Administered Medications   Medication Dose Route Frequency Provider Last Rate Last Dose    [START ON 9/15/2020] indomethacin (INDOCIN) 50 MG suppository 100 mg  100 mg Rectal See Admin Instructions Vanda AUSTIN Sugar, APRN - CNP        [START ON 9/15/2020] lactated ringers bolus  1,000 mL Intravenous Once Vanda AUSTIN Sugar, APRN - CNP        furosemide (LASIX) tablet 40 mg  40 mg Oral Daily Lee Sandhoff, MD        levothyroxine (SYNTHROID) tablet 75 mcg  75 mcg Oral Daily Lee Sandhoff, MD   75 mcg at 09/13/20 1001    losartan (COZAAR) tablet 50 mg  50 mg Oral Daily Lee Sandhoff, MD   50 mg at 09/13/20 1001    metoprolol succinate (TOPROL XL) extended release tablet 25 mg  25 mg Oral Daily Lee Sandhoff, MD   25 mg at 09/13/20 1001    pantoprazole (PROTONIX) tablet 40 mg  40 mg Oral Daily Lee Sandhoff, MD   40 mg at 09/13/20 1001    sertraline (ZOLOFT) tablet 50 mg  50 mg Oral Daily Lee Sandhoff, MD   50 mg at 09/13/20 1004    topiramate (TOPAMAX) tablet 25 mg  25 mg Oral Daily Lee Sandhoff, MD        lactated ringers infusion   Intravenous Continuous Lee Sandhoff, MD 75 mL/hr at 09/13/20 1302      ketorolac (TORADOL) injection 15 mg  15 mg Intravenous Q6H PRN Lee Sandhoff, MD   15 mg at 09/13/20 1302    morphine (PF) injection 2 mg  2 mg Intravenous Q3H PRN Lee Sandhoff, MD        Or    morphine sulfate (PF) injection 4 mg  4 mg Intravenous Q3H PRN Lee Sandhoff, MD        acetaminophen (TYLENOL) tablet 650 mg  650 mg Oral Q4H PRN Lee Sandhoff, MD        ondansetron [facility]) injection 4 mg  4 mg Intravenous Q6H PRN Lee Sandhoff, MD        cefTRIAXone (ROCEPHIN) 1 g in sterile water 10 mL IV syringe  1 g Intravenous Q24H Lee Sandhoff, MD   1 g at 09/12/20 1823    metronidazole (FLAGYL) 500 mg in NaCl 100 mL IVPB premix  500 mg Intravenous Q8H Lee Sandhoff,  mL/hr at 09/13/20 1552 500 mg at 09/13/20 1552       Allergies:     Allergies Allergen Reactions    Macrobid [Nitrofurantoin Monohyd Macro] Nausea Only and Rash    Macrobid [Nitrofurantoin] Anaphylaxis    Ciprofloxacin     Iodine      Only when injected    Penicillins        Problem List:    Patient Active Problem List   Diagnosis Code    SIRS (systemic inflammatory response syndrome) ([facility]) R65.10    Nausea and vomiting R11.2    RUQ abdominal tenderness R10.811    Elevated brain natriuretic peptide (BNP) level R79.89    Essential hypertension I10    Hypothyroidism E03.9    History of TIA (transient ischemic attack) Z86.73    Nonischemic cardiomyopathy ([facility]) I42.8    Chronic combined systolic and diastolic HF (heart failure) ([facility]) I50.42    Moderate obesity E66.8    Pure hypercholesterolemia E78.00    Cognitive dysfunction F09    Choledocholithiasis K80.50       Past Medical History:        Diagnosis Date    History of TIA (transient ischemic attack)     TIAs ~ noted on brain imaging done because pt was having imbalance    Hypertension     Thyroid disease        Past Surgical History:        Procedure Laterality Date    KNEE SURGERY      THYROID LOBECTOMY         Social History:    Social History     Tobacco Use    Smoking status: Former Smoker     Packs/day: 1.00     Years: 40.00     Pack years: 40.00     Types: Cigarettes     Last attempt to quit:      Years since quittin.7    Smokeless tobacco: Never Used   Substance Use Topics    Alcohol use: Yes     Comment: rare mixed drink;  drinks 1 cup of coffee and pop qd                                Counseling given: Not Answered      Vital Signs (Current):   Vitals:    20 1516 20 1554 20 0858   BP: 117/62 134/69 (!) 125/59 (!) 116/56   Pulse: 60 65 60 78   Resp: 14 16 16 16   Temp: 97.8 °F (36.6 °C) 97.7 °F (36.5 °C) 98.1 °F (36.7 °C) 98.2 °F (36.8 °C)   TempSrc: Oral Oral Oral Oral   SpO2: 95% 99% 98%    Weight:       Height: BP Readings from Last 3 Encounters:   09/13/20 (!) 116/56   10/22/19 90/60   03/23/18 (!) 150/104       NPO Status:                                                                                 BMI:   Wt Readings from Last 3 Encounters:   09/12/20 180 lb (81.6 kg)   10/22/19 185 lb 3.2 oz (84 kg)   02/05/18 195 lb (88.5 kg)     Body mass index is 30.42 kg/m². CBC:   Lab Results   Component Value Date    WBC 11.0 09/13/2020    RBC 3.78 09/13/2020    HGB 9.8 09/13/2020    HCT 31.4 09/13/2020    MCV 83.1 09/13/2020    RDW 16.4 09/13/2020     09/13/2020       CMP:   Lab Results   Component Value Date     09/13/2020    K 4.3 09/13/2020    K 4.3 09/12/2020     09/13/2020    CO2 23 09/13/2020    BUN 16 09/13/2020    CREATININE 1.1 09/13/2020    GFRAA 57 09/13/2020    LABGLOM 48 09/13/2020    GLUCOSE 53 09/13/2020    PROT 5.9 09/13/2020    CALCIUM 9.3 09/13/2020    BILITOT 3.0 09/13/2020    ALKPHOS 378 09/13/2020    AST 77 09/13/2020     09/13/2020       POC Tests: No results for input(s): POCGLU, POCNA, POCK, POCCL, POCBUN, POCHEMO, POCHCT in the last 72 hours. Coags:   Lab Results   Component Value Date    PROTIME 14.5 09/17/2017    INR 1.3 09/17/2017    APTT 25.2 09/17/2017       HCG (If Applicable): No results found for: PREGTESTUR, PREGSERUM, HCG, HCGQUANT     ABGs: No results found for: PHART, PO2ART, TID3VCD, DHS8AZZ, BEART, [id number]     Type & Screen (If Applicable):  No results found for: LABABO, LABRH    Drug/Infectious Status (If Applicable):  No results found for: HIV, HEPCAB    COVID-19 Screening (If Applicable): No results found for: COVID19      Anesthesia Evaluation  Patient summary reviewed no history of anesthetic complications:   Airway: Mallampati: III  TM distance: >3 FB   Neck ROM: full  Mouth opening: > = 3 FB Dental:      Comment: Molars--Crowns. Discolored.     Pulmonary: breath sounds clear to auscultation      (-) not a current smoker Cardiovascular:    (+) hypertension:,       ECG reviewed  Rhythm: regular  Rate: normal    Stress test reviewed                Neuro/Psych:   (+) TIA,             GI/Hepatic/Renal:   (+) renal disease: CRI,          ROS comment: Gallstones/dilated CBD. .   Endo/Other:    (+) hypothyroidism, blood dyscrasia: anemia:., .                 Abdominal:           Vascular:                                      Anesthesia Plan      MAC     ASA 3     (Pt agrees to [facility] ATHENS and IV sedation. To  be re-evaluated by DOS anesthesiologist.)  Induction: intravenous. Anesthetic plan and risks discussed with patient and child/children.                     Juventino Espitia MD   9/13/2020

## 2020-09-13 NOTE — CONSULTS
Gastroenterology Consult Note   Vanda FOSTER NP-C with Starlett Sandifer, M.D. Consult Note        Date of Service: 9/13/2020  Reason for Consult: multiple CBD stones on CT scan   Requesting Physician: Lucia Neil    CHIEF COMPLAINT: \"terrible abdominal pain, nausea, and vomiting\"    History Obtained From: patient, EMR    HISTORY OF PRESENT ILLNESS:       Natalie Guadarrama is a 78 y.o. female with significant past medical history of thyroid disease, HTN, TIA admitted via ED for abdominal pain. Pt reports to upper abdominal pain described as \"terrible thought I was going to die\" rated at a 13/10. States the pain was radiating around into her back. Took 2 Ibuprofen, 2 Tylenol, and 2 Ultram for pain relief. States \"the Ultram did nothing and the others helped mildly. Eating worsened the pain. Had Waffles & scrambled eggs that morning prior to onset of the discomfort. Nauseated with vomiting. Describes the emesis as \"food intially, then was just all liquid\". Denies any hematemesis. Fevers 103 at home orally. Chilling to the point that \"my teeth were chattering\". Denies any recent sick contacts. Constipation is normal for the patient. Every other day formed brown stools. Last BM on Thursday described as \"formed hard beige stool- the color was weird\". Denies any melena or hematochezia. Ongoing poor appetite with her symptoms. 36# weight loss over the last 2 years intentional. She attends weight watchers. Had a EGD and colon \"years ago\", showed \"irritation & ulcers in my stomach. My colon was clean enough was supposed to go back but I neve did\". Denies any [address] of colon cancer. Admission labs creat 1.4, protein 5.6, albumin 2.9, , , AST 88, bili 4.0, direct 3.6, lipase >3000, WBC 17.8, H&H 9.9 & 31.6, MCH 25.8, MCHC 31.3, RDW 16.2, neutro 81.7%, lymph 8.1%, abso neutro 14.54, abso lymph 1.44, abso mono 1.48.   CT ABD: Distended gallbladder with wall thickening edema and multiple gallstones with dilated common bile duct with multiple gallstones in the common bile duct concerning for acute cholecystitis. Minimal atelectasis/infiltrates in the right lung base. Developing pneumonia is considered. Consultation for multiple CBD stones on CT scan. Currently, pt reports to feeling somewhat  better. Receiving IV pain medications. Has been NPO. Labs today creat 1.1, albumin 2.8, , , AST 77, bili 3.0, lipase >3000, H&H 9.8 & 31.4, MCH 25.9, MCHC 31.2, RDW 16.4. Past Medical History:        Diagnosis Date    History of TIA (transient ischemic attack)     TIAs ~2002 noted on brain imaging done because pt was having imbalance    Hypertension     Thyroid disease      Past Surgical History:        Procedure Laterality Date    KNEE SURGERY      THYROID LOBECTOMY       Current Medications:    Current Facility-Administered Medications: furosemide (LASIX) tablet 40 mg, 40 mg, Oral, Daily  levothyroxine (SYNTHROID) tablet 75 mcg, 75 mcg, Oral, Daily  losartan (COZAAR) tablet 50 mg, 50 mg, Oral, Daily  metoprolol succinate (TOPROL XL) extended release tablet 25 mg, 25 mg, Oral, Daily  pantoprazole (PROTONIX) tablet 40 mg, 40 mg, Oral, Daily  sertraline (ZOLOFT) tablet 50 mg, 50 mg, Oral, Daily  topiramate (TOPAMAX) tablet 25 mg, 25 mg, Oral, Daily  lactated ringers infusion, , Intravenous, Continuous  ketorolac (TORADOL) injection 15 mg, 15 mg, Intravenous, Q6H PRN  morphine (PF) injection 2 mg, 2 mg, Intravenous, Q3H PRN **OR** morphine sulfate (PF) injection 4 mg, 4 mg, Intravenous, Q3H PRN  acetaminophen (TYLENOL) tablet 650 mg, 650 mg, Oral, Q4H PRN  ondansetron (ZOFRAN) injection 4 mg, 4 mg, Intravenous, Q6H PRN  cefTRIAXone (ROCEPHIN) 1 g in sterile water 10 mL IV syringe, 1 g, Intravenous, Q24H  metronidazole (FLAGYL) 500 mg in NaCl 100 mL IVPB premix, 500 mg, Intravenous, Q8H    Allergies:  Macrobid [nitrofurantoin monohyd macro]; Macrobid [nitrofurantoin]; Ciprofloxacin;  Iodine; and Penicillins    Social History:    Tobacco:  Pt quit 10 years ago; prior 1/2 PPD since the age of 10  Alcohol:  Pt reports occasional mixed drink  Illicit Drugs: Pt denies    Family History:   Father- D/ prostate/ lung/ spinal CA  Mother- D/ old age  [de-identified]- D/ Crohn's  Daughter- A/H  2 sons- A/H    REVIEW OF SYSTEMS:    Aside from what was mentioned in the [address] and HPI, essentially unremarkable, all others negative. PHYSICAL EXAM:      Vitals:    BP (!) 116/56   Pulse 78   Temp 98.2 °F (36.8 °C) (Oral)   Resp 16   Ht 5' 4.5\" (1.638 m)   Wt 180 lb (81.6 kg)   SpO2 98%   BMI 30.42 kg/m²       CONSTITUTIONAL:  awake, alert, cooperative, no apparent distress, and appears stated age  EYES:  pupils equal, round and reactive to light, sclera anicteric and conjunctiva normal  ENT:  normocephalic, oral pharynx with moist mucous membranes  NECK:  supple   LUNGS:   clear to auscultation bilaterally.   CARDIOVASCULAR:  regular rate and rhythm, no murmur noted; 2+ pulses; no edema  ABDOMEN:  normal bowel sounds, softly distended, upper abdominal tenderness to palpitation, no guarding or rebound, no masses palpated, no hepatosplenomegally  MUSCULOSKELETAL:  full range of motion noted  motor strength is 5 out of 5 all extremities bilaterally  NEUROLOGIC:  Mental Status Exam:  Level of Alertness:   awake  Orientation:   person, place, time  Motor Exam:  Motor exam is symmetrical 5 out of 5 all extremities bilaterally  SKIN:  normal skin color, texture, turgor    DATA:    CBC with Differential:    Lab Results   Component Value Date    WBC 11.0 09/13/2020    RBC 3.78 09/13/2020    HGB 9.8 09/13/2020    HCT 31.4 09/13/2020     09/13/2020    MCV 83.1 09/13/2020    MCH 25.9 09/13/2020    MCHC 31.2 09/13/2020    RDW 16.4 09/13/2020    NRBC 0.0 09/18/2017    BLASTSPCT 0.9 09/18/2017    LYMPHOPCT 8.1 09/12/2020    MONOPCT 8.3 09/12/2020    BASOPCT 0.3 09/12/2020    ATYLYMREL 2.6 09/18/2017    MONOSABS 1.48 09/12/2020    LYMPHSABS 1.44 09/12/2020 EOSABS 0.16 2020    BASOSABS 0.06 2020     CMP:    Lab Results   Component Value Date     2020    K 4.3 2020    K 4.3 2020     2020    CO2 23 2020    BUN 16 2020    CREATININE 1.1 2020    GFRAA 57 2020    LABGLOM 48 2020    GLUCOSE 53 2020    PROT 5.9 2020    LABALBU 2.8 2020    CALCIUM 9.3 2020    BILITOT 3.0 2020    ALKPHOS 378 2020    AST 77 2020     2020     Hepatic Function Panel:    Lab Results   Component Value Date    ALKPHOS 378 2020     2020    AST 77 2020    PROT 5.9 2020    BILITOT 3.0 2020    BILIDIR 3.6 2020    IBILI 0.4 2020    LABALBU 2.8 2020     PT/INR:    Lab Results   Component Value Date    PROTIME 14.5 2017    INR 1.3 2017     PTT:    Lab Results   Component Value Date    APTT 25.2 2017   [APTT}  Last 3 Troponin:    Lab Results   Component Value Date    TROPONINI <0.01 2020    TROPONINI 0.32 2017    TROPONINI 0.61 2017       Ct Abdomen Pelvis Wo Contrast Additional Contrast? None    Result Date: 2020  Patient MRN:  [id number] : 1940 Age: 78 years Gender: Female Order Date:  2020 1:11 PM EXAM: CT ABDOMEN PELVIS WO CONTRAST November of images 399. Technique: Low-dose CT  acquisition technique included one of following options; 1 . Automated exposure control, 2. Adjustment of MA and or KV according to patient's size or 3. Use of iterative reconstruction. INDICATION:  epigastric pain epigastric pain COMPARISON: 2009 FINDINGS: The lung bases demonstrate minimal atelectasis/infiltrates in the right middle lobe. The liver is fatty infiltrated. The gallbladder is distended with wall thickening and multiple gallstones. The common bile duct is dilated measuring 1.1 cm with  multiple impacted gallstones in the distal common bile duct.  Some gallstones are seen in the duodenum. Spleen, pancreas, and adrenals are normal. The kidneys are small and lobulated and atrophic. 40% compression deformity of L1 is noted with the diffuse degenerative changes in the lumbar spine. Pelvis. The bladder is normal. There is diverticulosis of the colon without diverticulitis. The appendix is normal.     Distended gallbladder with wall thickening edema and multiple gallstones with dilated common bile duct with multiple gallstones in the common bile duct concerning for acute cholecystitis. Minimal atelectasis/infiltrates in the right lung base. Developing pneumonia is considered. IMPRESSION:    · Abdominal pain- upper  · Nausea with vomiting  · Elevated LFTs  · Gallstones W/I CBD  · Hyperbilirubinemia   · Acute cholecystitis  · Constipation  · Gallstone pancreatitis  · Anemia, normocytic  · PMH of gastric ulcers     RECOMMENDATIONS:      · OK for clears for today  · ERCP on Tuesday with Dr. Dawson Godoy. · LR boluses ordered  · Indocin supp prior to ERCP  · Medical management per Primary Care  · Continue to medicate for nausea as ordered  · Need Colonoscopy completed as a OP; office to arrange  · Continue IV antibiotic per Surgery's orders  · Lap alesha to follow per General Surgery  · Continue Protonix as ordered  · Amylase, lipase in AM  · Trend labs  · Will follow    Thank you very much for your consultation. We will follow closely with you. Discussed with Dr. Karyle Salm developed by Dr. Alejandra Cazares, NP-C 9/13/2020 8:00 AM for Dr. Franklyn Lu. I HAD A FACE TO FACE ENCOUNTER WITH THE PATIENT. AGREE WITH THE EXAM, ASSESSMENT, AND PLAN AS OUTLINED ABOVE. ADDITION AND CORRECTIONS WERE DONE AS DEEMED APPROPRIATE. MY EXAM AND PLAN INCLUDE: CBD STONE FINDINGS NOTED. MARKEDLY INCREASE IN LIPASE >3000. WILL PLAN FOR ERCP ON Tuesday.

## 2020-09-14 PROBLEM — R10.811 RUQ ABDOMINAL TENDERNESS: Status: RESOLVED | Noted: 2017-09-17 | Resolved: 2020-09-14

## 2020-09-14 PROBLEM — R65.10 SIRS (SYSTEMIC INFLAMMATORY RESPONSE SYNDROME) (HCC): Status: RESOLVED | Noted: 2017-09-17 | Resolved: 2020-09-14

## 2020-09-14 PROBLEM — R79.89 ELEVATED BRAIN NATRIURETIC PEPTIDE (BNP) LEVEL: Status: RESOLVED | Noted: 2017-09-17 | Resolved: 2020-09-14

## 2020-09-14 PROBLEM — K85.10 GALLSTONE PANCREATITIS: Status: ACTIVE | Noted: 2017-09-17

## 2020-09-14 LAB
ALBUMIN SERPL-MCNC: 2.9 G/DL (ref 3.5–5.2)
ALP BLD-CCNC: 404 U/L (ref 35–104)
ALT SERPL-CCNC: 94 U/L (ref 0–32)
AMYLASE: 130 U/L (ref 20–100)
ANION GAP SERPL CALCULATED.3IONS-SCNC: 9 MMOL/L (ref 7–16)
AST SERPL-CCNC: 75 U/L (ref 0–31)
BILIRUB SERPL-MCNC: 1.6 MG/DL (ref 0–1.2)
BUN BLDV-MCNC: 12 MG/DL (ref 8–23)
CALCIUM SERPL-MCNC: 9.5 MG/DL (ref 8.6–10.2)
CHLORIDE BLD-SCNC: 104 MMOL/L (ref 98–107)
CO2: 24 MMOL/L (ref 22–29)
CREAT SERPL-MCNC: 1.1 MG/DL (ref 0.5–1)
GFR AFRICAN AMERICAN: 57
GFR NON-AFRICAN AMERICAN: 48 ML/MIN/1.73
GLUCOSE BLD-MCNC: 92 MG/DL (ref 74–99)
HCT VFR BLD CALC: 31.7 % (ref 34–48)
HEMOGLOBIN: 9.9 G/DL (ref 11.5–15.5)
LIPASE: 774 U/L (ref 13–60)
MCH RBC QN AUTO: 25.6 PG (ref 26–35)
MCHC RBC AUTO-ENTMCNC: 31.2 % (ref 32–34.5)
MCV RBC AUTO: 81.9 FL (ref 80–99.9)
PDW BLD-RTO: 16.4 FL (ref 11.5–15)
PLATELET # BLD: 247 E9/L (ref 130–450)
PMV BLD AUTO: 10.3 FL (ref 7–12)
POTASSIUM SERPL-SCNC: 4.1 MMOL/L (ref 3.5–5)
RBC # BLD: 3.87 E12/L (ref 3.5–5.5)
SODIUM BLD-SCNC: 137 MMOL/L (ref 132–146)
TOTAL PROTEIN: 6 G/DL (ref 6.4–8.3)
WBC # BLD: 9.1 E9/L (ref 4.5–11.5)

## 2020-09-14 PROCEDURE — 85027 COMPLETE CBC AUTOMATED: CPT

## 2020-09-14 PROCEDURE — 6360000002 HC RX W HCPCS: Performed by: SURGERY

## 2020-09-14 PROCEDURE — 2580000003 HC RX 258: Performed by: SURGERY

## 2020-09-14 PROCEDURE — 97165 OT EVAL LOW COMPLEX 30 MIN: CPT

## 2020-09-14 PROCEDURE — 6360000002 HC RX W HCPCS: Performed by: INTERNAL MEDICINE

## 2020-09-14 PROCEDURE — 1200000000 HC SEMI PRIVATE

## 2020-09-14 PROCEDURE — 82150 ASSAY OF AMYLASE: CPT

## 2020-09-14 PROCEDURE — 80053 COMPREHEN METABOLIC PANEL: CPT

## 2020-09-14 PROCEDURE — 6370000000 HC RX 637 (ALT 250 FOR IP): Performed by: INTERNAL MEDICINE

## 2020-09-14 PROCEDURE — 36415 COLL VENOUS BLD VENIPUNCTURE: CPT

## 2020-09-14 PROCEDURE — 2500000003 HC RX 250 WO HCPCS: Performed by: SURGERY

## 2020-09-14 PROCEDURE — 83690 ASSAY OF LIPASE: CPT

## 2020-09-14 PROCEDURE — 6370000000 HC RX 637 (ALT 250 FOR IP): Performed by: SURGERY

## 2020-09-14 RX ORDER — HYDROXYZINE HYDROCHLORIDE 10 MG/1
10 TABLET, FILM COATED ORAL 3 TIMES DAILY PRN
Status: DISCONTINUED | OUTPATIENT
Start: 2020-09-14 | End: 2020-09-17 | Stop reason: HOSPADM

## 2020-09-14 RX ORDER — ONDANSETRON 2 MG/ML
4 INJECTION INTRAMUSCULAR; INTRAVENOUS EVERY 6 HOURS PRN
Status: DISCONTINUED | OUTPATIENT
Start: 2020-09-14 | End: 2020-09-17 | Stop reason: HOSPADM

## 2020-09-14 RX ORDER — POTASSIUM CHLORIDE 20 MEQ/1
40 TABLET, EXTENDED RELEASE ORAL PRN
Status: DISCONTINUED | OUTPATIENT
Start: 2020-09-14 | End: 2020-09-17 | Stop reason: HOSPADM

## 2020-09-14 RX ORDER — POTASSIUM CHLORIDE 7.45 MG/ML
10 INJECTION INTRAVENOUS PRN
Status: DISCONTINUED | OUTPATIENT
Start: 2020-09-14 | End: 2020-09-17 | Stop reason: HOSPADM

## 2020-09-14 RX ORDER — SODIUM CHLORIDE 0.9 % (FLUSH) 0.9 %
10 SYRINGE (ML) INJECTION EVERY 12 HOURS SCHEDULED
Status: DISCONTINUED | OUTPATIENT
Start: 2020-09-14 | End: 2020-09-17 | Stop reason: HOSPADM

## 2020-09-14 RX ORDER — PROMETHAZINE HYDROCHLORIDE 25 MG/1
12.5 TABLET ORAL EVERY 6 HOURS PRN
Status: DISCONTINUED | OUTPATIENT
Start: 2020-09-14 | End: 2020-09-16

## 2020-09-14 RX ORDER — ACETAMINOPHEN 325 MG/1
650 TABLET ORAL EVERY 6 HOURS PRN
Status: DISCONTINUED | OUTPATIENT
Start: 2020-09-14 | End: 2020-09-17 | Stop reason: HOSPADM

## 2020-09-14 RX ORDER — SODIUM CHLORIDE 0.9 % (FLUSH) 0.9 %
10 SYRINGE (ML) INJECTION PRN
Status: DISCONTINUED | OUTPATIENT
Start: 2020-09-14 | End: 2020-09-17 | Stop reason: HOSPADM

## 2020-09-14 RX ORDER — SENNA PLUS 8.6 MG/1
1 TABLET ORAL DAILY PRN
Status: DISCONTINUED | OUTPATIENT
Start: 2020-09-14 | End: 2020-09-17 | Stop reason: HOSPADM

## 2020-09-14 RX ORDER — ACETAMINOPHEN 650 MG/1
650 SUPPOSITORY RECTAL EVERY 6 HOURS PRN
Status: DISCONTINUED | OUTPATIENT
Start: 2020-09-14 | End: 2020-09-17 | Stop reason: HOSPADM

## 2020-09-14 RX ADMIN — METRONIDAZOLE 500 MG: 500 INJECTION, SOLUTION INTRAVENOUS at 07:40

## 2020-09-14 RX ADMIN — WATER 1 G: 1 INJECTION INTRAMUSCULAR; INTRAVENOUS; SUBCUTANEOUS at 17:38

## 2020-09-14 RX ADMIN — ONDANSETRON 4 MG: 2 INJECTION INTRAMUSCULAR; INTRAVENOUS at 15:59

## 2020-09-14 RX ADMIN — METOPROLOL SUCCINATE 25 MG: 25 TABLET, EXTENDED RELEASE ORAL at 09:42

## 2020-09-14 RX ADMIN — MORPHINE SULFATE 2 MG: 2 INJECTION, SOLUTION INTRAMUSCULAR; INTRAVENOUS at 00:10

## 2020-09-14 RX ADMIN — METRONIDAZOLE 500 MG: 500 INJECTION, SOLUTION INTRAVENOUS at 22:48

## 2020-09-14 RX ADMIN — SODIUM CHLORIDE, POTASSIUM CHLORIDE, SODIUM LACTATE AND CALCIUM CHLORIDE: 600; 310; 30; 20 INJECTION, SOLUTION INTRAVENOUS at 22:00

## 2020-09-14 RX ADMIN — SERTRALINE HYDROCHLORIDE 50 MG: 50 TABLET ORAL at 09:42

## 2020-09-14 RX ADMIN — KETOROLAC TROMETHAMINE 15 MG: 30 INJECTION, SOLUTION INTRAMUSCULAR at 05:21

## 2020-09-14 RX ADMIN — ENOXAPARIN SODIUM 40 MG: 40 INJECTION SUBCUTANEOUS at 09:43

## 2020-09-14 RX ADMIN — METRONIDAZOLE 500 MG: 500 INJECTION, SOLUTION INTRAVENOUS at 15:46

## 2020-09-14 RX ADMIN — PANTOPRAZOLE SODIUM 40 MG: 40 TABLET, DELAYED RELEASE ORAL at 09:42

## 2020-09-14 RX ADMIN — LEVOTHYROXINE SODIUM 75 MCG: 75 TABLET ORAL at 09:42

## 2020-09-14 RX ADMIN — HYDROXYZINE HYDROCHLORIDE 10 MG: 10 TABLET, FILM COATED ORAL at 16:05

## 2020-09-14 RX ADMIN — MORPHINE SULFATE 2 MG: 2 INJECTION, SOLUTION INTRAMUSCULAR; INTRAVENOUS at 15:46

## 2020-09-14 RX ADMIN — KETOROLAC TROMETHAMINE 15 MG: 30 INJECTION, SOLUTION INTRAMUSCULAR at 21:58

## 2020-09-14 RX ADMIN — METRONIDAZOLE 500 MG: 500 INJECTION, SOLUTION INTRAVENOUS at 00:10

## 2020-09-14 ASSESSMENT — PAIN SCALES - GENERAL
PAINLEVEL_OUTOF10: 5
PAINLEVEL_OUTOF10: 0
PAINLEVEL_OUTOF10: 3
PAINLEVEL_OUTOF10: 6
PAINLEVEL_OUTOF10: 5
PAINLEVEL_OUTOF10: 4
PAINLEVEL_OUTOF10: 0

## 2020-09-14 ASSESSMENT — PAIN DESCRIPTION - LOCATION
LOCATION: ABDOMEN
LOCATION: ABDOMEN;BACK
LOCATION: ABDOMEN

## 2020-09-14 ASSESSMENT — PAIN DESCRIPTION - ORIENTATION
ORIENTATION: MID
ORIENTATION: LEFT;UPPER
ORIENTATION: MID

## 2020-09-14 ASSESSMENT — PAIN - FUNCTIONAL ASSESSMENT
PAIN_FUNCTIONAL_ASSESSMENT: PREVENTS OR INTERFERES SOME ACTIVE ACTIVITIES AND ADLS

## 2020-09-14 ASSESSMENT — PAIN DESCRIPTION - DESCRIPTORS
DESCRIPTORS: ACHING;DISCOMFORT;DULL
DESCRIPTORS: ACHING;DISCOMFORT;DULL
DESCRIPTORS: ACHING;DISCOMFORT

## 2020-09-14 ASSESSMENT — PAIN DESCRIPTION - ONSET
ONSET: ON-GOING
ONSET: ON-GOING

## 2020-09-14 ASSESSMENT — PAIN DESCRIPTION - PROGRESSION
CLINICAL_PROGRESSION: GRADUALLY WORSENING
CLINICAL_PROGRESSION: GRADUALLY IMPROVING

## 2020-09-14 ASSESSMENT — PAIN DESCRIPTION - FREQUENCY
FREQUENCY: INTERMITTENT
FREQUENCY: INTERMITTENT

## 2020-09-14 ASSESSMENT — PAIN DESCRIPTION - PAIN TYPE
TYPE: ACUTE PAIN

## 2020-09-14 NOTE — PROGRESS NOTES
PROGRESS NOTE    Patient Presents with/Seen in Consultation For      *Reason for Consult: multiple CBD stones on CT scan      CHIEF COMPLAINT: \"terrible abdominal pain, nausea, and vomiting\"    Subjective:     Patient seen Sam Le in bed, no complaints at this time. States the abdominal pain has greatly improved. Tolerating clears. Denies any N/V. Had a soft brown stool yesterday afternoon. ERCP for tomorrow reviewed with the patient, all questions answered. Review of Systems  Aside from what was mentioned in the PMH and HPI, essentially unremarkable, all others negative. Objective:     Patient Vitals for the past 8 hrs:   BP Temp Temp src Pulse Resp SpO2   09/14/20 0816 (!) 118/58 98.9 °F (37.2 °C) Oral 70 16 95 %       General appearance: alert, awake, laying in bed, and cooperative  Eyes: conjunctivae/corneas clear. PERRL.   Lungs: clear to auscultation bilaterally  Heart: regular rate and rhythm, no murmur, 2+ pulses;  without edema  Abdomen: softly distended,  non-tender; bowel sounds normal; no masses,  no organomegaly  Extremities: extremities without edema  Pulses: 2+ and symmetric  Skin: Skin color, texture, turgor normal.   Neurologic: Grossly normal    sodium chloride flush 0.9 % injection 10 mL, 2 times per day  sodium chloride flush 0.9 % injection 10 mL, PRN  potassium chloride (KLOR-CON M) extended release tablet 40 mEq, PRN    Or  potassium bicarb-citric acid (EFFER-K) effervescent tablet 40 mEq, PRN    Or  potassium chloride 10 mEq/100 mL IVPB (Peripheral Line), PRN  acetaminophen (TYLENOL) tablet 650 mg, Q6H PRN    Or  acetaminophen (TYLENOL) suppository 650 mg, Q6H PRN  senna (SENOKOT) tablet 8.6 mg, Daily PRN  promethazine (PHENERGAN) tablet 12.5 mg, Q6H PRN    Or  ondansetron (ZOFRAN) injection 4 mg, Q6H PRN  enoxaparin (LOVENOX) injection 40 mg, Daily  [START ON 9/15/2020] indomethacin (INDOCIN) 50 MG suppository 100 mg, See Admin Instructions  [START ON 9/15/2020] lactated ringers bolus, Once  [facility] by provider] furosemide (LASIX) tablet 40 mg, Daily  levothyroxine (SYNTHROID) tablet 75 mcg, Daily  [Held by provider] losartan (COZAAR) tablet 50 mg, Daily  metoprolol succinate (TOPROL XL) extended release tablet 25 mg, Daily  pantoprazole (PROTONIX) tablet 40 mg, Daily  sertraline (ZOLOFT) tablet 50 mg, Daily  topiramate (TOPAMAX) tablet 25 mg, Daily  lactated ringers infusion, Continuous  ketorolac (TORADOL) injection 15 mg, Q6H PRN  morphine (PF) injection 2 mg, Q3H PRN    Or  morphine sulfate (PF) injection 4 mg, Q3H PRN  cefTRIAXone (ROCEPHIN) 1 g in sterile water 10 mL IV syringe, Q24H  metronidazole (FLAGYL) 500 mg in NaCl 100 mL IVPB premix, Q8H         Data Review  CBC:   Lab Results   Component Value Date    WBC 9.1 09/14/2020    RBC 3.87 09/14/2020    HGB 9.9 09/14/2020    HCT 31.7 09/14/2020    MCV 81.9 09/14/2020    MCH 25.6 09/14/2020    MCHC 31.2 09/14/2020    RDW 16.4 09/14/2020     09/14/2020    MPV 10.3 09/14/2020     CMP:    Lab Results   Component Value Date     09/14/2020    K 4.1 09/14/2020    K 4.3 09/12/2020     09/14/2020    CO2 24 09/14/2020    BUN 12 09/14/2020    CREATININE 1.1 09/14/2020    GFRAA 57 09/14/2020    LABGLOM 48 09/14/2020    GLUCOSE 92 09/14/2020    PROT 6.0 09/14/2020    LABALBU 2.9 09/14/2020    CALCIUM 9.5 09/14/2020    BILITOT 1.6 09/14/2020    ALKPHOS 404 09/14/2020    AST 75 09/14/2020    ALT 94 09/14/2020     Hepatic Function Panel:    Lab Results   Component Value Date    ALKPHOS 404 09/14/2020    ALT 94 09/14/2020    AST 75 09/14/2020    PROT 6.0 09/14/2020    BILITOT 1.6 09/14/2020    BILIDIR 3.6 09/12/2020    IBILI 0.4 09/12/2020    LABALBU 2.9 09/14/2020       PT/INR:    Lab Results   Component Value Date    PROTIME 14.5 09/17/2017    INR 1.3 09/17/2017       Assessment:     Principal Problem:  · Abdominal pain- upper  · Nausea with vomiting  · Elevated LFTs  · Gallstones W/I CBD  · Hyperbilirubinemia   · Acute cholecystitis  · Constipation  · Gallstone pancreatitis  · Anemia, normocytic  · PMH of gastric ulcers      Plan:     · Clears for today  · ERCP tomorrow with Dr. Marian Spaulding. Procedure details for ERCP drawn in detail. Complications including but not limited to pancreatitis, perforation, bleeding or infection are discussed in great detail. Risks, benefits, and alternatives have been explained. Pt has understood the information and has agreed to proceed.   · LR boluses ordered  · Indocin supp prior to ERCP  · Medical management per Primary Care  · Continue to medicate for nausea as ordered  · Need Colonoscopy completed as a OP; office to arrange  · Continue IV antibiotic per Surgery's orders  · Lap alesha to follow per General Surgery  · Continue Protonix as ordered  · Amylase, lipase in AM  · Trend labs  · Will follow    Discussed with Dr. Melba Hoffmann per Dr. Fredy Santos, NP-C 9/14/2020 8:15 AM For Dr. Marian Spaulding

## 2020-09-14 NOTE — PROGRESS NOTES
Internal Medicine Progress Note    Patient's name: Moises Hull  : 1940  Admission date: 2020  Date of service: 2020   Room: [address] SURGERY  Primary care physician: Brant Fothergill, MD    Malini Quintanilla was seen and examined at bedside. She is awake and alert and resting comfortably. She is requesting Benadryl, which she states that she takes at home when \"I feel itchy. \"  She does not have any new rashes, she just simply states that she feels itchy all over and would like some p.o. Benadryl. She does have some tenderness to palpation of her epigastric region and RUQ, however they are no worse than when she first arrived on . She has no other new complaints at this time. She is aware that she is for ERCP tomorrow. Review of Systems  There are no new complaints of chest pain, shortness of breath, vomiting, diarrhea, constipation.     Hospital Medications  Current Facility-Administered Medications   Medication Dose Route Frequency Provider Last Rate Last Dose    sodium chloride flush 0.9 % injection 10 mL  10 mL Intravenous 2 times per day Vinod E Volino, DO        sodium chloride flush 0.9 % injection 10 mL  10 mL Intravenous PRN Vinod E Volino, DO        potassium chloride (KLOR-CON M) extended release tablet 40 mEq  40 mEq Oral PRN Vinod E Volino, DO        Or    potassium bicarb-citric acid (EFFER-K) effervescent tablet 40 mEq  40 mEq Oral PRN Vinod E Volino, DO        Or    potassium chloride 10 mEq/100 mL IVPB (Peripheral Line)  10 mEq Intravenous PRN Vinod E Volino, DO        acetaminophen (TYLENOL) tablet 650 mg  650 mg Oral Q6H PRN Vinod E Volino, DO        Or    acetaminophen (TYLENOL) suppository 650 mg  650 mg Rectal Q6H PRN Vinod E Volino, DO        senna (SENOKOT) tablet 8.6 mg  1 tablet Oral Daily PRN Vinod E Volino, DO        promethazine (PHENERGAN) tablet 12.5 mg  12.5 mg Oral Q6H PRN Vinod E Volino, DO        Or    ondansetron (ZOFRAN) injection 4 mg  4 mg Intravenous Q6H PRN Vinod Godfrey DO        enoxaparin (LOVENOX) injection 40 mg  40 mg Subcutaneous Daily Vinod Godfrey DO        [START ON 9/15/2020] indomethacin (INDOCIN) 50 MG suppository 100 mg  100 mg Rectal See Admin Instructions Vanda AUSTIN Sugar, APRN - CNP        [START ON 9/15/2020] lactated ringers bolus  1,000 mL Intravenous Once Vanda A Sugar, APRN - CNP        [Held by provider] furosemide (LASIX) tablet 40 mg  40 mg Oral Daily Lucia Neil MD        levothyroxine (SYNTHROID) tablet 75 mcg  75 mcg Oral Daily Lucia Neil MD   75 mcg at 09/13/20 1001    [Held by provider] losartan (COZAAR) tablet 50 mg  50 mg Oral Daily Lucia Neil MD   50 mg at 09/13/20 1001    metoprolol succinate (TOPROL XL) extended release tablet 25 mg  25 mg Oral Daily Lucia Neil MD   25 mg at 09/13/20 1001    pantoprazole (PROTONIX) tablet 40 mg  40 mg Oral Daily Lucia Neil MD   40 mg at 09/13/20 1001    sertraline (ZOLOFT) tablet 50 mg  50 mg Oral Daily Lucia Neil MD   50 mg at 09/13/20 1004    topiramate (TOPAMAX) tablet 25 mg  25 mg Oral Daily Lucia Neil MD        lactated ringers infusion   Intravenous Continuous Lucia Neil MD 75 mL/hr at 09/13/20 1302      ketorolac (TORADOL) injection 15 mg  15 mg Intravenous Q6H PRN Lucia Neil MD   15 mg at 09/14/20 0521    morphine (PF) injection 2 mg  2 mg Intravenous Q3H PRN Lucia Neil MD   2 mg at 09/14/20 0010    Or    morphine sulfate (PF) injection 4 mg  4 mg Intravenous Q3H PRN Lucia Neil MD        cefTRIAXone (ROCEPHIN) 1 g in sterile water 10 mL IV syringe  1 g Intravenous Q24H Lucia Neil MD   1 g at 09/13/20 1750    metronidazole (FLAGYL) 500 mg in NaCl 100 mL IVPB premix  500 mg Intravenous Q8H Lucia Neil MD   Stopped at 09/14/20 0110       PRN Medications  sodium chloride flush, potassium chloride **OR** potassium alternative oral replacement **OR** potassium chloride, acetaminophen **OR** acetaminophen, senna, promethazine **OR** ondansetron, ketorolac, morphine **OR** morphine    Objective  Most Recent Recorded Vitals  BP (!) 134/59   Pulse 65   Temp 99.1 °F (37.3 °C) (Oral)   Resp 16   Ht 5' 4.5\" (1.638 m)   Wt 180 lb (81.6 kg)   SpO2 98%   BMI 30.42 kg/m²   I/O last 3 completed shifts: In: 900 [I.V.:900]  Out: 500 [Urine:500]  No intake/output data recorded. Physical Exam:  General: AAO to person/place/time/purpose, NAD, no labored breathing  Eyes: conjunctivae/corneas clear, sclera non icteric  Skin: color/texture/turgor normal, no rashes or lesions  Lungs: CTAB, no retractions/use of accessory muscles, no vocal fremitus, no rhonchi, no crackle, no rales  Heart: regular rate, regular rhythm, no murmur  Abdomen: soft, mildly tender to palpation in epigastric and right upper quadrant, bowel sounds normal; no masses,  no organomegaly  Extremities: atraumatic, no cyanosis, no edema  Neurologic: cranial nerves 2-12 grossly intact, no slurred speech. Most Recent Labs  Lab Results   Component Value Date    WBC 9.1 09/14/2020    HGB 9.9 (L) 09/14/2020    HCT 31.7 (L) 09/14/2020     09/14/2020     09/14/2020    K 4.1 09/14/2020     09/14/2020    CREATININE 1.1 (H) 09/14/2020    BUN 12 09/14/2020    CO2 24 09/14/2020    GLUCOSE 92 09/14/2020    ALT 94 (H) 09/14/2020    AST 75 (H) 09/14/2020    INR 1.3 09/17/2017       CT ABDOMEN PELVIS WO CONTRAST Additional Contrast? None   Final Result   Distended gallbladder with wall thickening edema and multiple   gallstones with dilated common bile duct with multiple gallstones in   the common bile duct concerning for acute cholecystitis. Minimal atelectasis/infiltrates in the right lung base. Developing   pneumonia is considered.             FL ERCP BILIARY AND PANCREATIC S&I    (Results Pending)       Assessment   Active Hospital Problems    Diagnosis    Gallstone pancreatitis [K85.10]     Priority: High    Choledocholithiasis [K80.50]     Priority: Medium    Pure hypercholesterolemia [E78.00]    Cognitive dysfunction [F09]    Moderate obesity [E66.8]    Chronic combined systolic and diastolic HF (heart failure) ([facility]) [I50.42]    Nonischemic cardiomyopathy ([facility] [address] [I42.8]    History of TIA (transient ischemic attack) [Z86.73]    Essential hypertension [I10]    Hypothyroidism [E03.9]         Plan  · Gallstone pancreatitis w/ associated cholangitis: Gen surg admitted patient. ATB's. GI following-- plan for ERCP 9/15. Lap alesha eventually. IVF (hold Lasix and ARB). CLD. Follow HFP/lipase. Zofran prn nausea. · PT/OT eventually. · Follow labs   · DVT prophylaxis  · Please see orders for further management and care. · Discharge plan: TBD    Patient seen, examined, and discussed with Dr. Ashia Hale. Electronically signed by Narcisa Abdullahi DO on 9/14/2020 at 8:55 AM     Addendum: I have personally participated in a face-to-face history and physical exam on the date of service with the patient. I have discussed the case with the resident. I also participated in medical decision making with the resident on the date of service and I agree with all of the pertinent clinical information unless indicated in my editing of the note. I have reviewed and edited the note above based on my findings during my history, exam, and decision making on the same day of service. My additional thoughts:    ERCP tomorrow   Add Atarax for itching    Electronically signed by Blanca Dominique DO on 9/14/2020 at 10:55 AM    I can be reached through [employer].

## 2020-09-14 NOTE — PROGRESS NOTES
Occupational Therapy  OCCUPATIONAL THERAPY INITIAL EVALUATION      Date:2020  Patient Name: Ailyn Pruitt  MRN: [id number]  : 1940  Room: [address] [place], [place] [address]    Referring Provider: Allysno Eastman DO    Evaluating OT: Rose Canal OTR/L [id number]    AM-PAC Daily Activity Raw Score: 1824    Recommended Adaptive Equipment: TBD    Diagnosis: choledocholithiasis. Pt presents to ED with abdominal pain     Pertinent Medical History: HTN   Precautions:  falls     Home Living: Pt lives with family in a 2 story home with B/B 2nd floor, bathroom available on 1st floor. Bathroom setup: tub/shower combo, standard commode     Prior Level of Function: Mod I with ADLs, family assists with IADLs; completed functional mobility with SPC  Driving: No    Pain Level: pt verbalizes abdominal discomfort with movement    Cognition: A&O: . Problem solving:  WFL   Judgement/safety: WFL     Functional Assessment:   Initial Eval Status  Date: 20 Treatment session:  Short Term Goals  Treatment frequency: 2-5x/wk PRN x1-3 wks     Feeding Independent     Grooming Set up  Independent   UB Dressing Set up   Independent   LB Dressing Mod A  Management of B socks seated EOB  Mod I    Bathing Min A  Mod I   Toileting Min A  Mod I   Bed Mobility  Supine to sit: NT seated EOB on entry     Functional Transfers STS: SBA  Mod I   Functional Mobility SBA with no AD  Short in room distance  Declines further reporting fatigue  Mod I during ADLs   Balance Sitting: fair plus    Standing: fair no AD, forward trunk flexion with mobility     Activity Tolerance Poor plus  standing carlos x6-7 min with fair plus balance during self care tasks             Treatment: Patient educated on techniques for completion of ADL, safe functional transfers and functional mobility.   Patient required cues for follow through with proper hand/foot placement, pacing, safety and technique in bed mobility, functional transfers, functional mobility and LB dressing in preparation for maximum independence in all self care tasks. Hand Dominance: Right []  Left []   Strength ROM Additional Info:    RUE  4-/5 WFL, limited end range shoulder motions good  and FMC/dexterity noted during ADL tasks     LUE 4-/5 WFL, limited end range shoulder motions good  and FMC/dexterity noted during ADL tasks         Hearing: [place]  Vision: WFL   Sensation:  No c/o numbness or tingling   Tone: WFL   Edema: none                             Long Term Goal (1-3 wks): Pt will maximize functional performance in all self care tasks/functional transfers with good follow through of all trained techniques for safe transition to next level of care    Eval Complexity: Low    Assessment of current deficits   Functional mobility [x]  ADLs [x] Strength [x]  Cognition []  Functional transfers  [x] IADLs [x] Safety Awareness [x]  Endurance [x]  Fine Motor Coordination [] Balance [x] Vision/perception [] Sensation []   Gross Motor Coordination [] ROM [] Delirium []                  Motor Control []    Plan of Care:   [x] ADL retraining/AE recommendations specific to decreased forward trunk flexion  [x] Energy Conservation Techniques/Strategies      [] Neuromuscular Re-Education      [x] Functional Transfer Training         [x] Functional Mobility Training          [] Cognitive Re-Training         [] Splinting/Positioning Needs           [x] Therapeutic Activity   [x]Therapeutic Exercise   [] Visual/Perceptual  [x] Delirium Prevention/Treatment   [x] Positioning to Improve Functional Jackson, Safety, and Skin Integrity   [x] Patient and/or Family Education to Increase Safety and Functional Jackson   [] Other:     Rehab Potential: Good for established goals     Patient / Family Goal: To get home. Patient and/or family were instructed on functional diagnosis, prognosis/goals and OT plan of care. Pt verbalized understanding. Upon arrival, patient supine in bed.  At end of session, patient seated in armchair with call light and phone within reach, all lines and tubes intact. Pt would benefit from continued skilled OT to increase safety and independence with completion of ADL/IADL tasks for functional independence and quality of life.      Low Evaluation  Time In: 1225  Time Out: 1235      Evaluation time includes thorough review of current medical information, gathering information on past medical history/social history and prior level of function, completion of standardized testing/informal observation of tasks, assessment of data, and development of POC/Goals    Lyle Franco OTR/L  [id number]

## 2020-09-14 NOTE — PATIENT CARE CONFERENCE
[facility] Quality Flow/Interdisciplinary Rounds Progress Note        Quality Flow Rounds held on September 14, 2020    Disciplines Attending:  Bedside Nurse, ,  and Nursing Unit Leadership    Hardeep Lux was admitted on 9/12/2020 12:07 PM    Anticipated Discharge Date:  Expected Discharge Date: 09/12/20    Disposition:    Darrell Score:  Darrell Scale Score: 20    Readmission Risk              Risk of Unplanned Readmission:        10           Discussed patient goal for the day, patient clinical progression, and barriers to discharge. The following Goal(s) of the Day/Commitment(s) have been identified:  Pain control. Await ERCP tomorrow.       Sally Alberto  September 14, 2020

## 2020-09-14 NOTE — CARE COORDINATION
Social Work:    Reviewed chart notes. Mrs. Zulema Tolentino was admitted to [facility] due to nausea and epigastric pain. Social service attempted to meet with Mrs. Zulema Tolentino but she was sleeping soundly. Social service placed a call to Kelsey Kelly, patient's daughter & main contact. Social service explained social work &  roles to Kelsey Berriosshaan and we discussed possible HHC, DME, and snf, depending on therapy and treatment plan per Dr. Be Marie. Kelsey Kelly advises that her mother has never been in skilled rehab, nor used [facility]. She presently anticipates her mother's return home and will accept [facility] and any DME recommendations. Social work to follow-up.     Electronically signed by ALLAN Sanchez on 9/14/2020 at 12:50 PM

## 2020-09-15 ENCOUNTER — ANESTHESIA EVENT (OUTPATIENT)
Dept: OPERATING ROOM | Age: 80
DRG: 418 | End: 2020-09-15
Payer: MEDICARE

## 2020-09-15 ENCOUNTER — APPOINTMENT (OUTPATIENT)
Dept: GENERAL RADIOLOGY | Age: 80
DRG: 418 | End: 2020-09-15
Payer: MEDICARE

## 2020-09-15 VITALS — SYSTOLIC BLOOD PRESSURE: 145 MMHG | OXYGEN SATURATION: 92 % | DIASTOLIC BLOOD PRESSURE: 67 MMHG

## 2020-09-15 LAB
ALBUMIN SERPL-MCNC: 2.2 G/DL (ref 3.5–5.2)
ALP BLD-CCNC: 320 U/L (ref 35–104)
ALT SERPL-CCNC: 65 U/L (ref 0–32)
AMYLASE: 31 U/L (ref 20–100)
ANION GAP SERPL CALCULATED.3IONS-SCNC: 8 MMOL/L (ref 7–16)
APTT: 31.5 SEC (ref 24.5–35.1)
AST SERPL-CCNC: 52 U/L (ref 0–31)
BASOPHILS ABSOLUTE: 0.05 E9/L (ref 0–0.2)
BASOPHILS RELATIVE PERCENT: 0.7 % (ref 0–2)
BILIRUB SERPL-MCNC: 1 MG/DL (ref 0–1.2)
BUN BLDV-MCNC: 8 MG/DL (ref 8–23)
CALCIUM SERPL-MCNC: 8.8 MG/DL (ref 8.6–10.2)
CHLORIDE BLD-SCNC: 107 MMOL/L (ref 98–107)
CO2: 23 MMOL/L (ref 22–29)
CREAT SERPL-MCNC: 1 MG/DL (ref 0.5–1)
EOSINOPHILS ABSOLUTE: 0.21 E9/L (ref 0.05–0.5)
EOSINOPHILS RELATIVE PERCENT: 3.1 % (ref 0–6)
GFR AFRICAN AMERICAN: >60
GFR NON-AFRICAN AMERICAN: 53 ML/MIN/1.73
GLUCOSE BLD-MCNC: 89 MG/DL (ref 74–99)
HCT VFR BLD CALC: 27.2 % (ref 34–48)
HEMOGLOBIN: 8.6 G/DL (ref 11.5–15.5)
IMMATURE GRANULOCYTES #: 0.1 E9/L
IMMATURE GRANULOCYTES %: 1.5 % (ref 0–5)
INR BLD: 1.2
LIPASE: 141 U/L (ref 13–60)
LYMPHOCYTES ABSOLUTE: 1.45 E9/L (ref 1.5–4)
LYMPHOCYTES RELATIVE PERCENT: 21.5 % (ref 20–42)
MCH RBC QN AUTO: 26 PG (ref 26–35)
MCHC RBC AUTO-ENTMCNC: 31.6 % (ref 32–34.5)
MCV RBC AUTO: 82.2 FL (ref 80–99.9)
MONOCYTES ABSOLUTE: 0.82 E9/L (ref 0.1–0.95)
MONOCYTES RELATIVE PERCENT: 12.2 % (ref 2–12)
NEUTROPHILS ABSOLUTE: 4.11 E9/L (ref 1.8–7.3)
NEUTROPHILS RELATIVE PERCENT: 61 % (ref 43–80)
PDW BLD-RTO: 16.4 FL (ref 11.5–15)
PLATELET # BLD: 198 E9/L (ref 130–450)
PMV BLD AUTO: 10.2 FL (ref 7–12)
POTASSIUM REFLEX MAGNESIUM: 3.6 MMOL/L (ref 3.5–5)
PROTHROMBIN TIME: 13.5 SEC (ref 9.3–12.4)
RBC # BLD: 3.31 E12/L (ref 3.5–5.5)
SODIUM BLD-SCNC: 138 MMOL/L (ref 132–146)
TOTAL PROTEIN: 4.9 G/DL (ref 6.4–8.3)
WBC # BLD: 6.7 E9/L (ref 4.5–11.5)

## 2020-09-15 PROCEDURE — 2500000003 HC RX 250 WO HCPCS: Performed by: NURSE ANESTHETIST, CERTIFIED REGISTERED

## 2020-09-15 PROCEDURE — 74330 X-RAY BILE/PANC ENDOSCOPY: CPT

## 2020-09-15 PROCEDURE — 1200000000 HC SEMI PRIVATE

## 2020-09-15 PROCEDURE — 2709999900 HC NON-CHARGEABLE SUPPLY: Performed by: INTERNAL MEDICINE

## 2020-09-15 PROCEDURE — 3700000000 HC ANESTHESIA ATTENDED CARE: Performed by: INTERNAL MEDICINE

## 2020-09-15 PROCEDURE — 7100000011 HC PHASE II RECOVERY - ADDTL 15 MIN: Performed by: INTERNAL MEDICINE

## 2020-09-15 PROCEDURE — 2720000010 HC SURG SUPPLY STERILE: Performed by: INTERNAL MEDICINE

## 2020-09-15 PROCEDURE — 85025 COMPLETE CBC W/AUTO DIFF WBC: CPT

## 2020-09-15 PROCEDURE — 3609015100 HC ERCP STENT PLACEMENT BILIARY/PANCREATIC DUCT: Performed by: INTERNAL MEDICINE

## 2020-09-15 PROCEDURE — 83690 ASSAY OF LIPASE: CPT

## 2020-09-15 PROCEDURE — 36415 COLL VENOUS BLD VENIPUNCTURE: CPT

## 2020-09-15 PROCEDURE — 6360000002 HC RX W HCPCS: Performed by: SURGERY

## 2020-09-15 PROCEDURE — 2580000003 HC RX 258: Performed by: NURSE PRACTITIONER

## 2020-09-15 PROCEDURE — 80053 COMPREHEN METABOLIC PANEL: CPT

## 2020-09-15 PROCEDURE — 85610 PROTHROMBIN TIME: CPT

## 2020-09-15 PROCEDURE — C1769 GUIDE WIRE: HCPCS | Performed by: INTERNAL MEDICINE

## 2020-09-15 PROCEDURE — 6370000000 HC RX 637 (ALT 250 FOR IP): Performed by: INTERNAL MEDICINE

## 2020-09-15 PROCEDURE — 3700000001 HC ADD 15 MINUTES (ANESTHESIA): Performed by: INTERNAL MEDICINE

## 2020-09-15 PROCEDURE — 2500000003 HC RX 250 WO HCPCS: Performed by: SURGERY

## 2020-09-15 PROCEDURE — 0FC98ZZ EXTIRPATION OF MATTER FROM COMMON BILE DUCT, VIA NATURAL OR ARTIFICIAL OPENING ENDOSCOPIC: ICD-10-PCS | Performed by: INTERNAL MEDICINE

## 2020-09-15 PROCEDURE — 2580000003 HC RX 258: Performed by: NURSE ANESTHETIST, CERTIFIED REGISTERED

## 2020-09-15 PROCEDURE — 0F798DZ DILATION OF COMMON BILE DUCT WITH INTRALUMINAL DEVICE, VIA NATURAL OR ARTIFICIAL OPENING ENDOSCOPIC: ICD-10-PCS | Performed by: INTERNAL MEDICINE

## 2020-09-15 PROCEDURE — 2580000003 HC RX 258: Performed by: SURGERY

## 2020-09-15 PROCEDURE — 7100000010 HC PHASE II RECOVERY - FIRST 15 MIN: Performed by: INTERNAL MEDICINE

## 2020-09-15 PROCEDURE — 82150 ASSAY OF AMYLASE: CPT

## 2020-09-15 PROCEDURE — 85730 THROMBOPLASTIN TIME PARTIAL: CPT

## 2020-09-15 PROCEDURE — 6360000004 HC RX CONTRAST MEDICATION: Performed by: INTERNAL MEDICINE

## 2020-09-15 PROCEDURE — 6370000000 HC RX 637 (ALT 250 FOR IP): Performed by: NURSE PRACTITIONER

## 2020-09-15 PROCEDURE — 6370000000 HC RX 637 (ALT 250 FOR IP): Performed by: SURGERY

## 2020-09-15 PROCEDURE — 6360000002 HC RX W HCPCS: Performed by: NURSE ANESTHETIST, CERTIFIED REGISTERED

## 2020-09-15 PROCEDURE — C1874 STENT, COATED/COV W/DEL SYS: HCPCS | Performed by: INTERNAL MEDICINE

## 2020-09-15 DEVICE — STENT SYSTEM RMV
Type: IMPLANTABLE DEVICE | Status: NON-FUNCTIONAL
Brand: WALLFLEX BILIARY
Removed: 2020-10-21

## 2020-09-15 RX ORDER — PROPOFOL 10 MG/ML
INJECTION, EMULSION INTRAVENOUS PRN
Status: DISCONTINUED | OUTPATIENT
Start: 2020-09-15 | End: 2020-09-15 | Stop reason: SDUPTHER

## 2020-09-15 RX ORDER — LIDOCAINE HYDROCHLORIDE 20 MG/ML
INJECTION, SOLUTION EPIDURAL; INFILTRATION; INTRACAUDAL; PERINEURAL PRN
Status: DISCONTINUED | OUTPATIENT
Start: 2020-09-15 | End: 2020-09-15 | Stop reason: SDUPTHER

## 2020-09-15 RX ORDER — SODIUM CHLORIDE 9 MG/ML
INJECTION, SOLUTION INTRAVENOUS CONTINUOUS PRN
Status: DISCONTINUED | OUTPATIENT
Start: 2020-09-15 | End: 2020-09-15 | Stop reason: SDUPTHER

## 2020-09-15 RX ORDER — FENTANYL CITRATE 50 UG/ML
INJECTION, SOLUTION INTRAMUSCULAR; INTRAVENOUS PRN
Status: DISCONTINUED | OUTPATIENT
Start: 2020-09-15 | End: 2020-09-15 | Stop reason: SDUPTHER

## 2020-09-15 RX ADMIN — WATER 1 G: 1 INJECTION INTRAMUSCULAR; INTRAVENOUS; SUBCUTANEOUS at 17:35

## 2020-09-15 RX ADMIN — PROPOFOL 10 MG: 10 INJECTION, EMULSION INTRAVENOUS at 12:30

## 2020-09-15 RX ADMIN — METRONIDAZOLE 500 MG: 500 INJECTION, SOLUTION INTRAVENOUS at 06:35

## 2020-09-15 RX ADMIN — GLUCAGON HYDROCHLORIDE 0.25 MG: KIT at 12:30

## 2020-09-15 RX ADMIN — METRONIDAZOLE 500 MG: 500 INJECTION, SOLUTION INTRAVENOUS at 15:33

## 2020-09-15 RX ADMIN — INDOMETHACIN 100 MG: 50 SUPPOSITORY RECTAL at 10:59

## 2020-09-15 RX ADMIN — KETOROLAC TROMETHAMINE 15 MG: 30 INJECTION, SOLUTION INTRAMUSCULAR at 14:55

## 2020-09-15 RX ADMIN — PROPOFOL 10 MG: 10 INJECTION, EMULSION INTRAVENOUS at 12:24

## 2020-09-15 RX ADMIN — SODIUM CHLORIDE: 9 INJECTION, SOLUTION INTRAVENOUS at 12:18

## 2020-09-15 RX ADMIN — METRONIDAZOLE 500 MG: 500 INJECTION, SOLUTION INTRAVENOUS at 23:22

## 2020-09-15 RX ADMIN — PROPOFOL 10 MG: 10 INJECTION, EMULSION INTRAVENOUS at 12:26

## 2020-09-15 RX ADMIN — PROPOFOL 10 MG: 10 INJECTION, EMULSION INTRAVENOUS at 12:28

## 2020-09-15 RX ADMIN — SODIUM CHLORIDE, POTASSIUM CHLORIDE, SODIUM LACTATE AND CALCIUM CHLORIDE: 600; 310; 30; 20 INJECTION, SOLUTION INTRAVENOUS at 15:34

## 2020-09-15 RX ADMIN — FENTANYL CITRATE 50 MCG: 50 INJECTION, SOLUTION INTRAMUSCULAR; INTRAVENOUS at 12:22

## 2020-09-15 RX ADMIN — PROPOFOL 50 MG: 10 INJECTION, EMULSION INTRAVENOUS at 12:22

## 2020-09-15 RX ADMIN — METOPROLOL SUCCINATE 25 MG: 25 TABLET, EXTENDED RELEASE ORAL at 09:25

## 2020-09-15 RX ADMIN — LIDOCAINE HYDROCHLORIDE 30 MG: 20 INJECTION, SOLUTION EPIDURAL; INFILTRATION; INTRACAUDAL; PERINEURAL at 12:22

## 2020-09-15 RX ADMIN — MORPHINE SULFATE 2 MG: 2 INJECTION, SOLUTION INTRAMUSCULAR; INTRAVENOUS at 17:40

## 2020-09-15 RX ADMIN — GLUCAGON HYDROCHLORIDE 0.25 MG: KIT at 12:27

## 2020-09-15 RX ADMIN — GLUCAGON HYDROCHLORIDE 0.25 MG: KIT at 12:32

## 2020-09-15 RX ADMIN — MORPHINE SULFATE 2 MG: 2 INJECTION, SOLUTION INTRAMUSCULAR; INTRAVENOUS at 20:49

## 2020-09-15 RX ADMIN — PROPOFOL 10 MG: 10 INJECTION, EMULSION INTRAVENOUS at 12:32

## 2020-09-15 RX ADMIN — TOPIRAMATE 25 MG: 25 TABLET, FILM COATED ORAL at 09:25

## 2020-09-15 RX ADMIN — HYDROXYZINE HYDROCHLORIDE 10 MG: 10 TABLET, FILM COATED ORAL at 00:04

## 2020-09-15 RX ADMIN — SODIUM CHLORIDE, POTASSIUM CHLORIDE, SODIUM LACTATE AND CALCIUM CHLORIDE 1000 ML: 600; 310; 30; 20 INJECTION, SOLUTION INTRAVENOUS at 10:13

## 2020-09-15 RX ADMIN — IOPAMIDOL 9 ML: 612 INJECTION, SOLUTION INTRAVENOUS at 12:42

## 2020-09-15 ASSESSMENT — PAIN SCALES - GENERAL
PAINLEVEL_OUTOF10: 0
PAINLEVEL_OUTOF10: 8
PAINLEVEL_OUTOF10: 6
PAINLEVEL_OUTOF10: 0
PAINLEVEL_OUTOF10: 2
PAINLEVEL_OUTOF10: 0
PAINLEVEL_OUTOF10: 6
PAINLEVEL_OUTOF10: 3
PAINLEVEL_OUTOF10: 5
PAINLEVEL_OUTOF10: 2

## 2020-09-15 ASSESSMENT — PAIN DESCRIPTION - LOCATION
LOCATION: ABDOMEN

## 2020-09-15 ASSESSMENT — PAIN DESCRIPTION - ONSET
ONSET: ON-GOING

## 2020-09-15 ASSESSMENT — PAIN DESCRIPTION - ORIENTATION
ORIENTATION: MID

## 2020-09-15 ASSESSMENT — PAIN DESCRIPTION - PAIN TYPE
TYPE: ACUTE PAIN

## 2020-09-15 ASSESSMENT — PAIN DESCRIPTION - DESCRIPTORS
DESCRIPTORS: ACHING;DISCOMFORT;DULL
DESCRIPTORS: ACHING;DISCOMFORT
DESCRIPTORS: ACHING;DISCOMFORT

## 2020-09-15 ASSESSMENT — PAIN - FUNCTIONAL ASSESSMENT
PAIN_FUNCTIONAL_ASSESSMENT: PREVENTS OR INTERFERES SOME ACTIVE ACTIVITIES AND ADLS

## 2020-09-15 ASSESSMENT — PAIN DESCRIPTION - FREQUENCY
FREQUENCY: INTERMITTENT

## 2020-09-15 ASSESSMENT — PAIN DESCRIPTION - PROGRESSION
CLINICAL_PROGRESSION: GRADUALLY WORSENING

## 2020-09-15 ASSESSMENT — LIFESTYLE VARIABLES: SMOKING_STATUS: 0

## 2020-09-15 NOTE — OP NOTE
[address] [address]                                OPERATIVE REPORT    PATIENT NAME: Mary Rae                 :        1940  MED REC NO:   [id number]                            ROOM:  ACCOUNT NO:   [de-identified]                           ADMIT DATE: 2020  PROVIDER:     Jensen Johansen MD    DATE OF PROCEDURE:  09/15/2020    PROCEDURE PERFORMED:  ERCP with papillotomy, stone extraction, and stent  placement. PREOPERATIVE DIAGNOSIS:  Choledocholithiasis. POSTOPERATIVE DIAGNOSIS:  Choledocholithiasis. PROCEDURE PERFORMED:  ERCP with papillotomy and four stones extraction  and Wallstent placement. ANESTHESIA:  LMAC. DESCRIPTION OF PROCEDURE:  With the patient on her left lateral  decubitus position, the Olympus side-viewing video duodenoscope was  introduced into the esophagus, advanced through the GE junction into the  gastric body, advanced through the pylorus into duodenal bulb and second  portion of duodenum where papilla was visualized and positioned at 12  o'clock position. The papilla was inside a duodenal diverticulum at  about 3 o'clock and with slight maneuvering we were fortunate and  successful in cannulating the common bile duct readily on the first try. A guidewire was introduced and deep cannulation of the common bile duct  was obtained. Over the guidewire, the papillotome was adjusted and an  adequately sized albeit small papillotomy was performed in view of the  intradiverticular nature of the papillotomy. Over the guidewire, the  papillotome was then exchanged with number 9-12 Icelandic balloon and  varying the balloon inflation between 9 and 12, four large stones were  swept and removed.   Excellent drainage was obtained thereafter and  minimal oozing was seen, about 5 mL of estimated blood loss, and over  the guidewire, the balloon was then exchanged with 10-Icelandic 60 mm

## 2020-09-15 NOTE — PROGRESS NOTES
Physical Therapy  Pt refused PT eval in AM due to going for a procedure later. Will re-attempt at later date.

## 2020-09-15 NOTE — BRIEF OP NOTE
Brief Postoperative Note    Michael Jones  YOB: 1940  [id number]    Procedure:  ERCP    Anesthesia: [facility]      Surgeon:  Bravo Clemons MD      Findings: CBD: PAPILLA FOUND ISIDE A DUODENAL DIVERTICULUM. DILATED CBD WITH MULTIPLE LARGE FILLING DEFECTS C/W STONES. PAPILLOTOMY WAS DONE AND 4 LARGE STONES WERE REMOVED                   10 FR.  60 MM WALL STENT WAS PLACED WITH EXCELLENT DRAINAGE                      PD: NOT ENTERED                          Complications: None      Estimated blood loss: none        Cami Buitrago MD

## 2020-09-15 NOTE — PROGRESS NOTES
[facility] Quality Flow/Interdisciplinary Rounds Progress Note        Quality Flow Rounds held on September 15, 2020    Disciplines Attending:  Bedside Nurse, ,  and Nursing Unit Leadership    Nathanael Landin was admitted on 9/12/2020 12:07 PM    Anticipated Discharge Date:  Expected Discharge Date: 09/12/20    Disposition:    Darrell Score:  Darrell Scale Score: 21    Readmission Risk              Risk of Unplanned Readmission:        10           Discussed patient goal for the day, patient clinical progression, and barriers to discharge.   The following Goal(s) of the Day/Commitment(s) have been identified:  Discharge [address] [place]  September 15, 2020

## 2020-09-15 NOTE — ANESTHESIA PRE PROCEDURE
Department of Anesthesiology  Preprocedure Note       Name:  Mario Mcclellan   Age:  78 y.o.  :  1940                                          MRN:  [id number]         Date:  9/15/2020      Surgeon: Marlen Mustafa):  Komal Rodríguez MD    Procedure: LAPAROSCOPIC CHOLECYSTECTOMY POSSIBLE OPEN POSSIBLE GRAM    Medications prior to admission:   Prior to Admission medications    Medication Sig Start Date End Date Taking?  Authorizing Provider   metoprolol succinate (TOPROL XL) 25 MG extended release tablet TAKE 1 TABLET BY MOUTH  DAILY 20   Rafael Nguyen MD   furosemide (LASIX) 40 MG tablet Take 40 mg by mouth daily    Historical Provider, MD   Cholecalciferol (VITAMIN D) 2000 units CAPS capsule Take by mouth daily    Historical Provider, MD   topiramate (TOPAMAX) 25 MG tablet Take 25 mg by mouth daily    Historical Provider, MD   Potassium (POTASSIMIN PO) Take 20 mEq by mouth daily    Historical Provider, MD   atorvastatin (LIPITOR) 40 MG tablet TAKE 1 TABLET BY MOUTH  NIGHTLY 10/14/19   Reyes Rains, MD   Cyanocobalamin (B-12 PO) Take by mouth daily     Historical Provider, MD   CALCIUM-MAGNESIUM-ZINC PO Take by mouth daily    Historical Provider, MD   Cetirizine HCl (ZYRTEC PO) Take by mouth daily    Historical Provider, MD   Fluticasone Propionate (FLONASE NA) by Nasal route    Historical Provider, MD   levothyroxine (SYNTHROID) 75 MCG tablet Take 75 mcg by mouth daily  17   Historical Provider, MD   pantoprazole (PROTONIX) 40 MG tablet Take 40 mg by mouth daily  17   Historical Provider, MD   losartan (COZAAR) 50 MG tablet Take 1 tablet by mouth daily 17   Vinod Godfrey DO   aspirin 81 MG tablet Take 81 mg by mouth daily    Historical Provider, MD   calcium-vitamin D (OSCAL) 250-125 MG-UNIT per tablet Take 1 tablet by mouth daily    Historical Provider, MD   traMADol (ULTRAM) 50 MG tablet Take 100 mg by mouth 3 times daily     Historical Provider, MD   ranitidine (ZANTAC) 150 MG tablet Take 150 mg by mouth nightly     Historical Provider, MD       Current medications:    No current facility-administered medications for this visit. No current outpatient medications on file.      Facility-Administered Medications Ordered in Other Visits   Medication Dose Route Frequency Provider Last Rate Last Dose    HYDROmorphone (DILAUDID) injection 0.5 mg  0.5 mg Intravenous Q4H PRN Fer Huggins MD        sodium chloride flush 0.9 % injection 10 mL  10 mL Intravenous 2 times per day Vinod Godfrey, DO        sodium chloride flush 0.9 % injection 10 mL  10 mL Intravenous PRN Vinod SWAN Volino, DO        potassium chloride (KLOR-CON M) extended release tablet 40 mEq  40 mEq Oral PRN Vinod E Volino, DO        Or    potassium bicarb-citric acid (EFFER-K) effervescent tablet 40 mEq  40 mEq Oral PRN Vinod E Volino, DO        Or    potassium chloride 10 mEq/100 mL IVPB (Peripheral Line)  10 mEq Intravenous PRN Vinod E Volino, DO        acetaminophen (TYLENOL) tablet 650 mg  650 mg Oral Q6H PRN Vinod Cravenino, DO        Or    acetaminophen (TYLENOL) suppository 650 mg  650 mg Rectal Q6H PRN Vinod SWAN Volino, DO        senna (SENOKOT) tablet 8.6 mg  1 tablet Oral Daily PRN Vinod Cravenino, DO        promethazine (PHENERGAN) tablet 12.5 mg  12.5 mg Oral Q6H PRN Vinod E Merissaino, DO        Or    ondansetron (ZOFRAN) injection 4 mg  4 mg Intravenous Q6H PRN Vinod E Volino, DO   4 mg at 09/14/20 1559    enoxaparin (LOVENOX) injection 40 mg  40 mg Subcutaneous Daily Vinod E Volino, DO   40 mg at 09/14/20 4300    hydrOXYzine (ATARAX) tablet 10 mg  10 mg Oral TID PRN Vinod Cravenino, DO   10 mg at 09/15/20 0004    indomethacin (INDOCIN) 50 MG suppository 100 mg  100 mg Rectal See Admin Instructions Vanda A Sugar, APRN - CNP   100 mg at 09/15/20 1059    [Held by provider] furosemide (LASIX) tablet 40 mg  40 mg Oral Daily Paulina Barriga MD        levothyroxine (SYNTHROID) tablet 75 mcg  75 mcg Oral Daily Paulina Barriga MD Stopped at 09/15/20 0747    [Held by provider] losartan (COZAAR) tablet 50 mg  50 mg Oral Daily Cisco Sosa MD   50 mg at 09/13/20 1001    metoprolol succinate (TOPROL XL) extended release tablet 25 mg  25 mg Oral Daily Cisco Sosa MD   25 mg at 09/15/20 0925    pantoprazole (PROTONIX) tablet 40 mg  40 mg Oral Daily Cisco Sosa MD   Stopped at 09/15/20 0747    sertraline (ZOLOFT) tablet 50 mg  50 mg Oral Daily Cisco Sosa MD   Stopped at 09/15/20 0747    topiramate (TOPAMAX) tablet 25 mg  25 mg Oral Daily Cisco Sosa MD   25 mg at 09/15/20 0925    lactated ringers infusion   Intravenous Continuous Cisco Sosa MD 75 mL/hr at 09/15/20 1534      ketorolac (TORADOL) injection 15 mg  15 mg Intravenous Q6H PRN Cisco Sosa MD   15 mg at 09/15/20 1455    morphine (PF) injection 2 mg  2 mg Intravenous Q3H PRN Cisco Sosa MD   2 mg at 09/14/20 1546    Or    morphine sulfate (PF) injection 4 mg  4 mg Intravenous Q3H PRN Cisco Sosa MD        cefTRIAXone (ROCEPHIN) 1 g in sterile water 10 mL IV syringe  1 g Intravenous Q24H Cisco Sosa MD   1 g at 09/14/20 1738    metronidazole (FLAGYL) 500 mg in NaCl 100 mL IVPB premix  500 mg Intravenous Q8H Cisco Sosa  mL/hr at 09/15/20 1533 500 mg at 09/15/20 1533       Allergies:     Allergies   Allergen Reactions    Macrobid [Nitrofurantoin Monohyd Macro] Nausea Only and Rash    Macrobid [Nitrofurantoin] Anaphylaxis    Ciprofloxacin     Iodine      Only when injected    Penicillins        Problem List:    Patient Active Problem List   Diagnosis Code    Gallstone pancreatitis K85.10    Essential hypertension I10    Hypothyroidism E03.9    History of TIA (transient ischemic attack) Z86.73    Nonischemic cardiomyopathy (HCC) I42.8    Chronic combined systolic and diastolic HF (heart failure) (HCC) I50.42    Moderate obesity E66.8    Pure hypercholesterolemia E78.00    Cognitive dysfunction F09    Choledocholithiasis K80.50       Past Medical History:        Diagnosis Date    History of TIA (transient ischemic attack)     TIAs ~ noted on brain imaging done because pt was having imbalance    Hypertension     Thyroid disease        Past Surgical History:        Procedure Laterality Date    ERCP N/A 9/15/2020    ERCP STENT INSERTION with BALLOON SWEEPING and PAPILLOTOMY performed by Jacobo Awad MD at [address] [place] [address] Way         Social History:    Social History     Tobacco Use    Smoking status: Former Smoker     Packs/day: 1.00     Years: 40.00     Pack years: 40.00     Types: Cigarettes     Last attempt to quit:      Years since quittin.7    Smokeless tobacco: Never Used   Substance Use Topics    Alcohol use: Yes     Comment: rare mixed drink;  drinks 1 cup of coffee and pop qd                                Counseling given: Not Answered      Vital Signs (Current): There were no vitals filed for this visit.                                            BP Readings from Last 3 Encounters:   09/15/20 117/80   09/15/20 (!) 145/67   10/22/19 90/60       NPO Status:                                                                                 BMI:   Wt Readings from Last 3 Encounters:   09/15/20 181 lb 12.8 oz (82.5 kg)   10/22/19 185 lb 3.2 oz (84 kg)   18 195 lb (88.5 kg)     There is no height or weight on file to calculate BMI.    CBC:   Lab Results   Component Value Date    WBC 6.7 09/15/2020    RBC 3.31 09/15/2020    HGB 8.6 09/15/2020    HCT 27.2 09/15/2020    MCV 82.2 09/15/2020    RDW 16.4 09/15/2020     09/15/2020       CMP:   Lab Results   Component Value Date     09/15/2020    K 3.6 09/15/2020     09/15/2020    CO2 23 09/15/2020    BUN 8 09/15/2020    CREATININE 1.0 09/15/2020    GFRAA >60 09/15/2020    LABGLOM 53 09/15/2020    GLUCOSE 89 09/15/2020    PROT 4.9 09/15/2020    CALCIUM 8.8 09/15/2020    BILITOT 1.0 09/15/2020    ALKPHOS 320 09/15/2020    AST 52

## 2020-09-15 NOTE — PLAN OF CARE
Problem: Falls - Risk of:  Goal: Will remain free from falls  Description: Will remain free from falls  Outcome: Met This Shift  Goal: Absence of physical injury  Description: Absence of physical injury  Outcome: Met This Shift     Problem: Pain:  Goal: Pain level will decrease  Description: Pain level will decrease  Outcome: Met This Shift  Goal: Control of acute pain  Description: Control of acute pain  Outcome: Met This Shift No

## 2020-09-15 NOTE — PROGRESS NOTES
Occupational Therapy  Pt laying in the bed. Declined therapy at this time. States she was up earlier and now awaiting to go to a procedure.    Heydi Solis JENNA/L [id number]

## 2020-09-15 NOTE — ANESTHESIA POSTPROCEDURE EVALUATION
Department of Anesthesiology  Postprocedure Note    Patient: Chandana Nguyen  MRN: [id number]  YOB: 1940  Date of evaluation: 9/15/2020  Time:  2:21 PM     Procedure Summary     Date:  09/15/20 Room / Location:  [address]    Anesthesia Start:  1218 Anesthesia Stop:  7762    Procedures:       ERCP STENT INSERTION with BALLOON SWEEPING and PAPILLOTOMY (N/A )      ERCP Diagnosis:  (-)    Surgeon:  Fer Huggins MD Responsible Provider:  Madelin Delgado MD    Anesthesia Type:  MAC ASA Status:  3          Anesthesia Type: MAC    Dwaine Phase I:      Dwaine Phase II: Dwaine Score: 10    Last vitals: Reviewed and per EMR flowsheets.        Anesthesia Post Evaluation    Patient location during evaluation: PACU  Patient participation: complete - patient participated  Level of consciousness: awake and alert  Pain score: 0  Airway patency: patent  Nausea & Vomiting: no vomiting and no nausea  Complications: no  Cardiovascular status: hemodynamically stable  Respiratory status: spontaneous ventilation  Hydration status: stable

## 2020-09-15 NOTE — PROGRESS NOTES
GENERAL SURGERY  DAILY PROGRESS NOTE  9/15/2020    Chief Complaint   Patient presents with    Abdominal Pain     mid abdominal pain    Nausea       Subjective:  Pt states she is has no abdominal pain or N/V. No acute events overnight.     Objective:  BP (!) 141/67   Pulse 67   Temp 99.3 °F (37.4 °C) (Oral)   Resp 16   Ht 5' 4.5\" (1.638 m)   Wt 181 lb 12.8 oz (82.5 kg)   SpO2 95%   BMI 30.72 kg/m²     GENERAL:  Laying in bed, awake, alert, cooperative, no apparent distress  HEAD: Normocephalic, atraumatic  EYES: No sclera icterus, pupils equal  LUNGS:  No increased work of breathing  CARDIOVASCULAR:  RR  ABDOMEN:  Soft, TTP epigastric, non-distended  EXTREMITIES: No edema or swelling  SKIN: Warm and dry    Assessment/Plan:  78 y.o. female with gallstone pancreatitis and cholangitis    - Continue Rocephin/Flagyl  - NPO for procedure today 9/15  - Plan for ERCP today 9/15  - Trend LFTs  - Pain control PRN  - Antiemetic PRN  - Lap alesha timing TBD after ERCP    Electronically signed by Madie Simpson MD on 9/15/2020 at 5:06 AM    Seen/examined  For ERCP today  Will save a spot for lap alesha tomorrow afternoon if no post ERCP issues  Zoila

## 2020-09-15 NOTE — PROGRESS NOTES
Internal Medicine Progress Note    Patient's name: Nathanael Landin  : 1940  Admission date: 2020  Date of service: 9/15/2020   Room: [address] SURGERY  Primary care physician: Kathy Mccord MD    Subjective  Pete Olszewski was seen and examined at bedside. She is laying in bed comfortably, has no new complaints. Still states that she has some abdominal bloating and tenderness, however it has not changed in quality or severity. She is for ERCP today with Dr. Camron Duncan. Review of Systems  There are no new complaints of chest pain, shortness of breath, vomiting, diarrhea, constipation.     Hospital Medications  Current Facility-Administered Medications   Medication Dose Route Frequency Provider Last Rate Last Dose    sodium chloride flush 0.9 % injection 10 mL  10 mL Intravenous 2 times per day Vinod E Volino, DO        sodium chloride flush 0.9 % injection 10 mL  10 mL Intravenous PRN Vinod E Volino, DO        potassium chloride (KLOR-CON M) extended release tablet 40 mEq  40 mEq Oral PRN Vinod SWAN Volino, DO        Or    potassium bicarb-citric acid (EFFER-K) effervescent tablet 40 mEq  40 mEq Oral PRN Vinod E Volino, DO        Or    potassium chloride 10 mEq/100 mL IVPB (Peripheral Line)  10 mEq Intravenous PRN Vinod E Volino, DO        acetaminophen (TYLENOL) tablet 650 mg  650 mg Oral Q6H PRN Vinod E Volino, DO        Or    acetaminophen (TYLENOL) suppository 650 mg  650 mg Rectal Q6H PRN Vinod E Volino, DO        senna (SENOKOT) tablet 8.6 mg  1 tablet Oral Daily PRN Vinod Cravenino, DO        promethazine (PHENERGAN) tablet 12.5 mg  12.5 mg Oral Q6H PRN Vinod E Merissaino, DO        Or    ondansetron (ZOFRAN) injection 4 mg  4 mg Intravenous Q6H PRN Vinod E Volino, DO   4 mg at 20 1559    enoxaparin (LOVENOX) injection 40 mg  40 mg Subcutaneous Daily Vinod E Volino, DO   40 mg at 20 0943    hydrOXYzine (ATARAX) tablet 10 mg  10 mg Oral TID PRN Vinod Cravenino, DO   10 mg at 09/15/20 0004    indomethacin (INDOCIN) 50 MG suppository 100 mg  100 mg Rectal See Admin Instructions Vanda A Sugar, APRN - CNP        lactated ringers bolus  1,000 mL Intravenous Once Vanda A Sugar, APRN - CNP        [Held by provider] furosemide (LASIX) tablet 40 mg  40 mg Oral Daily Lucia Neil MD        levothyroxine (SYNTHROID) tablet 75 mcg  75 mcg Oral Daily Lucia Neil MD   75 mcg at 09/14/20 0942    [Held by provider] losartan (COZAAR) tablet 50 mg  50 mg Oral Daily Lucia Neil MD   50 mg at 09/13/20 1001    metoprolol succinate (TOPROL XL) extended release tablet 25 mg  25 mg Oral Daily Lucia Neil MD   25 mg at 09/14/20 0942    pantoprazole (PROTONIX) tablet 40 mg  40 mg Oral Daily Lucia Neil MD   40 mg at 09/14/20 0942    sertraline (ZOLOFT) tablet 50 mg  50 mg Oral Daily Lucia Neil MD   50 mg at 09/14/20 0942    topiramate (TOPAMAX) tablet 25 mg  25 mg Oral Daily Lucia Neil MD        lactated ringers infusion   Intravenous Continuous Lucia Neil MD 75 mL/hr at 09/14/20 2200      ketorolac (TORADOL) injection 15 mg  15 mg Intravenous Q6H PRN Lucia Neil MD   15 mg at 09/14/20 2158    morphine (PF) injection 2 mg  2 mg Intravenous Q3H PRN Lucia Neil MD   2 mg at 09/14/20 1546    Or    morphine sulfate (PF) injection 4 mg  4 mg Intravenous Q3H PRN Lucia Neil MD        cefTRIAXone (ROCEPHIN) 1 g in sterile water 10 mL IV syringe  1 g Intravenous Q24H Lucia Neil MD   1 g at 09/14/20 1738    metronidazole (FLAGYL) 500 mg in NaCl 100 mL IVPB premix  500 mg Intravenous Q8H Lucia Neil  mL/hr at 09/15/20 0635 500 mg at 09/15/20 0635       PRN Medications  sodium chloride flush, potassium chloride **OR** potassium alternative oral replacement **OR** potassium chloride, acetaminophen **OR** acetaminophen, senna, promethazine **OR** ondansetron, hydrOXYzine, ketorolac, morphine **OR** morphine    Objective  Most Recent Recorded Vitals  BP (!) 141/67   Pulse 67   Temp 99.3 °F (37.4 °C) (Oral)   Resp 16   Ht 5' 4.5\" (1.638 m)   Wt 181 lb 12.8 oz (82.5 kg)   SpO2 95%   BMI 30.72 kg/m²   I/O last 3 completed shifts:  In: -   Out: 100 [Urine:100]  No intake/output data recorded. Physical Exam:  General: AAO to person/place/time/purpose, NAD, no labored breathing  Eyes: conjunctivae/corneas clear, sclera non icteric  Skin: color/texture/turgor normal, no rashes or lesions  Lungs: CTAB, no retractions/use of accessory muscles, no vocal fremitus, no rhonchi, no crackle, no rales  Heart: regular rate, regular rhythm, no murmur  Abdomen: soft, tender to palpation in epigastric and right upper quadrant, bowel sounds normal; no masses,  no organomegaly  Extremities: atraumatic, no cyanosis, no edema  Neurologic: cranial nerves 2-12 grossly intact, no slurred speech. Most Recent Labs  Lab Results   Component Value Date    WBC 6.7 09/15/2020    HGB 8.6 (L) 09/15/2020    HCT 27.2 (L) 09/15/2020     09/15/2020     09/15/2020    K 3.6 09/15/2020     09/15/2020    CREATININE 1.0 09/15/2020    BUN 8 09/15/2020    CO2 23 09/15/2020    GLUCOSE 89 09/15/2020    ALT 65 (H) 09/15/2020    AST 52 (H) 09/15/2020    INR 1.3 09/17/2017       CT ABDOMEN PELVIS WO CONTRAST Additional Contrast? None   Final Result   Distended gallbladder with wall thickening edema and multiple   gallstones with dilated common bile duct with multiple gallstones in   the common bile duct concerning for acute cholecystitis. Minimal atelectasis/infiltrates in the right lung base. Developing   pneumonia is considered.             FL ERCP BILIARY AND PANCREATIC S&I    (Results Pending)       Assessment   Active Hospital Problems    Diagnosis    Gallstone pancreatitis [K85.10]     Priority: High    Choledocholithiasis [K80.50]     Priority: Medium    Pure hypercholesterolemia [E78.00]    Cognitive dysfunction [F09]    Moderate obesity [E66.8]    Chronic combined systolic and diastolic HF (heart failure) ([address] [I50.42]    Nonischemic cardiomyopathy ([facility] [address] [I42.8]    History of TIA (transient ischemic attack) [Z86.73]    Essential hypertension [I10]    Hypothyroidism [E03.9]       Plan  · Gallstone pancreatitis w/ associated cholangitis: Gen surg admitted patient. ATB's. GI following-- for ERCP 9/15. Lap alesha eventually (as soon as 9/16). IVF (hold Lasix and ARB). Follow HFP/lipase. Zofran prn nausea. · PT/OT eventually. · Follow labs   · DVT prophylaxis  · Please see orders for further management and care. · Discharge plan: TBD    Patient seen, examined, and discussed with Dr. Gifty Esquivel. Electronically signed by Sang Mcmanus DO on 9/15/2020 at 8:52 AM     Addendum: I have personally participated in a face-to-face history and physical exam on the date of service with the patient. I have discussed the case with the resident. I also participated in medical decision making with the resident on the date of service and I agree with all of the pertinent clinical information unless indicated in my editing of the note. I have reviewed and edited the note above based on my findings during my history, exam, and decision making on the same day of service. My additional thoughts:    She tolerated ERCP well   Cholecystectomy per gen surg     Electronically signed by Sky Mars DO on 9/15/2020 at 5:00 PM    I can be reached through [facility].

## 2020-09-15 NOTE — PROGRESS NOTES
Pt for ERCP today with Dr. Kwame Cloud. All additional questions answered. Labs reviewed, assessment unchanged. Pt stable for procedure.    Vanda Izquierdo NP-C

## 2020-09-16 ENCOUNTER — ANESTHESIA (OUTPATIENT)
Dept: OPERATING ROOM | Age: 80
DRG: 418 | End: 2020-09-16
Payer: MEDICARE

## 2020-09-16 VITALS — DIASTOLIC BLOOD PRESSURE: 69 MMHG | OXYGEN SATURATION: 98 % | SYSTOLIC BLOOD PRESSURE: 139 MMHG | TEMPERATURE: 96.8 F

## 2020-09-16 LAB
ABO/RH: NORMAL
ALBUMIN SERPL-MCNC: 2.5 G/DL (ref 3.5–5.2)
ALP BLD-CCNC: 350 U/L (ref 35–104)
ALT SERPL-CCNC: 58 U/L (ref 0–32)
AMYLASE: 82 U/L (ref 20–100)
ANION GAP SERPL CALCULATED.3IONS-SCNC: 7 MMOL/L (ref 7–16)
ANTIBODY SCREEN: NORMAL
AST SERPL-CCNC: 49 U/L (ref 0–31)
BASOPHILS ABSOLUTE: 0.05 E9/L (ref 0–0.2)
BASOPHILS RELATIVE PERCENT: 0.6 % (ref 0–2)
BILIRUB SERPL-MCNC: 1 MG/DL (ref 0–1.2)
BUN BLDV-MCNC: 7 MG/DL (ref 8–23)
CALCIUM SERPL-MCNC: 8.8 MG/DL (ref 8.6–10.2)
CHLORIDE BLD-SCNC: 106 MMOL/L (ref 98–107)
CO2: 25 MMOL/L (ref 22–29)
CREAT SERPL-MCNC: 0.9 MG/DL (ref 0.5–1)
EOSINOPHILS ABSOLUTE: 0.25 E9/L (ref 0.05–0.5)
EOSINOPHILS RELATIVE PERCENT: 3.1 % (ref 0–6)
GFR AFRICAN AMERICAN: >60
GFR NON-AFRICAN AMERICAN: 60 ML/MIN/1.73
GLUCOSE BLD-MCNC: 89 MG/DL (ref 74–99)
HCT VFR BLD CALC: 28.9 % (ref 34–48)
HEMOGLOBIN: 9.1 G/DL (ref 11.5–15.5)
IMMATURE GRANULOCYTES #: 0.1 E9/L
IMMATURE GRANULOCYTES %: 1.3 % (ref 0–5)
LIPASE: 204 U/L (ref 13–60)
LYMPHOCYTES ABSOLUTE: 1.34 E9/L (ref 1.5–4)
LYMPHOCYTES RELATIVE PERCENT: 16.9 % (ref 20–42)
MCH RBC QN AUTO: 25.8 PG (ref 26–35)
MCHC RBC AUTO-ENTMCNC: 31.5 % (ref 32–34.5)
MCV RBC AUTO: 81.9 FL (ref 80–99.9)
MONOCYTES ABSOLUTE: 0.91 E9/L (ref 0.1–0.95)
MONOCYTES RELATIVE PERCENT: 11.5 % (ref 2–12)
NEUTROPHILS ABSOLUTE: 5.29 E9/L (ref 1.8–7.3)
NEUTROPHILS RELATIVE PERCENT: 66.6 % (ref 43–80)
PDW BLD-RTO: 16.7 FL (ref 11.5–15)
PLATELET # BLD: 221 E9/L (ref 130–450)
PMV BLD AUTO: 10 FL (ref 7–12)
POTASSIUM REFLEX MAGNESIUM: 3.7 MMOL/L (ref 3.5–5)
RBC # BLD: 3.53 E12/L (ref 3.5–5.5)
SODIUM BLD-SCNC: 138 MMOL/L (ref 132–146)
TOTAL PROTEIN: 5.5 G/DL (ref 6.4–8.3)
WBC # BLD: 7.9 E9/L (ref 4.5–11.5)

## 2020-09-16 PROCEDURE — 82150 ASSAY OF AMYLASE: CPT

## 2020-09-16 PROCEDURE — 3700000001 HC ADD 15 MINUTES (ANESTHESIA): Performed by: SURGERY

## 2020-09-16 PROCEDURE — 6370000000 HC RX 637 (ALT 250 FOR IP): Performed by: STUDENT IN AN ORGANIZED HEALTH CARE EDUCATION/TRAINING PROGRAM

## 2020-09-16 PROCEDURE — 6360000002 HC RX W HCPCS: Performed by: SURGERY

## 2020-09-16 PROCEDURE — 6370000000 HC RX 637 (ALT 250 FOR IP): Performed by: SURGERY

## 2020-09-16 PROCEDURE — 86901 BLOOD TYPING SEROLOGIC RH(D): CPT

## 2020-09-16 PROCEDURE — 85025 COMPLETE CBC W/AUTO DIFF WBC: CPT

## 2020-09-16 PROCEDURE — 6360000002 HC RX W HCPCS: Performed by: ANESTHESIOLOGY

## 2020-09-16 PROCEDURE — 6370000000 HC RX 637 (ALT 250 FOR IP): Performed by: INTERNAL MEDICINE

## 2020-09-16 PROCEDURE — 2709999900 HC NON-CHARGEABLE SUPPLY: Performed by: SURGERY

## 2020-09-16 PROCEDURE — 7100000000 HC PACU RECOVERY - FIRST 15 MIN: Performed by: SURGERY

## 2020-09-16 PROCEDURE — 3600000014 HC SURGERY LEVEL 4 ADDTL 15MIN: Performed by: SURGERY

## 2020-09-16 PROCEDURE — 2500000003 HC RX 250 WO HCPCS: Performed by: SURGERY

## 2020-09-16 PROCEDURE — 2500000003 HC RX 250 WO HCPCS: Performed by: INTERNAL MEDICINE

## 2020-09-16 PROCEDURE — 3600000004 HC SURGERY LEVEL 4 BASE: Performed by: SURGERY

## 2020-09-16 PROCEDURE — 86850 RBC ANTIBODY SCREEN: CPT

## 2020-09-16 PROCEDURE — 88304 TISSUE EXAM BY PATHOLOGIST: CPT

## 2020-09-16 PROCEDURE — 3700000000 HC ANESTHESIA ATTENDED CARE: Performed by: SURGERY

## 2020-09-16 PROCEDURE — 7100000001 HC PACU RECOVERY - ADDTL 15 MIN: Performed by: SURGERY

## 2020-09-16 PROCEDURE — 36415 COLL VENOUS BLD VENIPUNCTURE: CPT

## 2020-09-16 PROCEDURE — 86900 BLOOD TYPING SEROLOGIC ABO: CPT

## 2020-09-16 PROCEDURE — 2700000000 HC OXYGEN THERAPY PER DAY

## 2020-09-16 PROCEDURE — 87081 CULTURE SCREEN ONLY: CPT

## 2020-09-16 PROCEDURE — 80053 COMPREHEN METABOLIC PANEL: CPT

## 2020-09-16 PROCEDURE — 1200000000 HC SEMI PRIVATE

## 2020-09-16 PROCEDURE — 0FT44ZZ RESECTION OF GALLBLADDER, PERCUTANEOUS ENDOSCOPIC APPROACH: ICD-10-PCS | Performed by: SURGERY

## 2020-09-16 PROCEDURE — 2500000003 HC RX 250 WO HCPCS: Performed by: NURSE ANESTHETIST, CERTIFIED REGISTERED

## 2020-09-16 PROCEDURE — 6360000002 HC RX W HCPCS

## 2020-09-16 PROCEDURE — 2580000003 HC RX 258: Performed by: INTERNAL MEDICINE

## 2020-09-16 PROCEDURE — 2580000003 HC RX 258: Performed by: NURSE ANESTHETIST, CERTIFIED REGISTERED

## 2020-09-16 PROCEDURE — 6360000002 HC RX W HCPCS: Performed by: NURSE ANESTHETIST, CERTIFIED REGISTERED

## 2020-09-16 PROCEDURE — 6360000002 HC RX W HCPCS: Performed by: INTERNAL MEDICINE

## 2020-09-16 PROCEDURE — 83690 ASSAY OF LIPASE: CPT

## 2020-09-16 RX ORDER — ONDANSETRON 2 MG/ML
INJECTION INTRAMUSCULAR; INTRAVENOUS PRN
Status: DISCONTINUED | OUTPATIENT
Start: 2020-09-16 | End: 2020-09-16 | Stop reason: SDUPTHER

## 2020-09-16 RX ORDER — MEPERIDINE HYDROCHLORIDE 25 MG/ML
12.5 INJECTION INTRAMUSCULAR; INTRAVENOUS; SUBCUTANEOUS
Status: DISCONTINUED | OUTPATIENT
Start: 2020-09-16 | End: 2020-09-16 | Stop reason: HOSPADM

## 2020-09-16 RX ORDER — NEOSTIGMINE METHYLSULFATE 1 MG/ML
INJECTION, SOLUTION INTRAVENOUS PRN
Status: DISCONTINUED | OUTPATIENT
Start: 2020-09-16 | End: 2020-09-16 | Stop reason: SDUPTHER

## 2020-09-16 RX ORDER — FENTANYL CITRATE 50 UG/ML
INJECTION, SOLUTION INTRAMUSCULAR; INTRAVENOUS PRN
Status: DISCONTINUED | OUTPATIENT
Start: 2020-09-16 | End: 2020-09-16 | Stop reason: SDUPTHER

## 2020-09-16 RX ORDER — SODIUM CHLORIDE, SODIUM LACTATE, POTASSIUM CHLORIDE, CALCIUM CHLORIDE 600; 310; 30; 20 MG/100ML; MG/100ML; MG/100ML; MG/100ML
INJECTION, SOLUTION INTRAVENOUS CONTINUOUS PRN
Status: DISCONTINUED | OUTPATIENT
Start: 2020-09-16 | End: 2020-09-16 | Stop reason: SDUPTHER

## 2020-09-16 RX ORDER — HYDROCODONE BITARTRATE AND ACETAMINOPHEN 5; 325 MG/1; MG/1
2 TABLET ORAL EVERY 4 HOURS PRN
Status: CANCELLED | OUTPATIENT
Start: 2020-09-16

## 2020-09-16 RX ORDER — LIDOCAINE HYDROCHLORIDE 20 MG/ML
INJECTION, SOLUTION INTRAVENOUS PRN
Status: DISCONTINUED | OUTPATIENT
Start: 2020-09-16 | End: 2020-09-16 | Stop reason: SDUPTHER

## 2020-09-16 RX ORDER — TRAMADOL HYDROCHLORIDE 50 MG/1
50 TABLET ORAL EVERY 6 HOURS PRN
Status: DISCONTINUED | OUTPATIENT
Start: 2020-09-16 | End: 2020-09-17 | Stop reason: HOSPADM

## 2020-09-16 RX ORDER — PROMETHAZINE HYDROCHLORIDE 25 MG/ML
6.25 INJECTION, SOLUTION INTRAMUSCULAR; INTRAVENOUS PRN
Status: DISCONTINUED | OUTPATIENT
Start: 2020-09-16 | End: 2020-09-16 | Stop reason: HOSPADM

## 2020-09-16 RX ORDER — OXYCODONE HYDROCHLORIDE AND ACETAMINOPHEN 5; 325 MG/1; MG/1
1 TABLET ORAL
Status: DISCONTINUED | OUTPATIENT
Start: 2020-09-16 | End: 2020-09-16 | Stop reason: HOSPADM

## 2020-09-16 RX ORDER — HYDROCODONE BITARTRATE AND ACETAMINOPHEN 5; 325 MG/1; MG/1
1 TABLET ORAL EVERY 4 HOURS PRN
Status: CANCELLED | OUTPATIENT
Start: 2020-09-16

## 2020-09-16 RX ORDER — ROCURONIUM BROMIDE 10 MG/ML
INJECTION, SOLUTION INTRAVENOUS PRN
Status: DISCONTINUED | OUTPATIENT
Start: 2020-09-16 | End: 2020-09-16 | Stop reason: SDUPTHER

## 2020-09-16 RX ORDER — DEXAMETHASONE SODIUM PHOSPHATE 4 MG/ML
INJECTION, SOLUTION INTRA-ARTICULAR; INTRALESIONAL; INTRAMUSCULAR; INTRAVENOUS; SOFT TISSUE PRN
Status: DISCONTINUED | OUTPATIENT
Start: 2020-09-16 | End: 2020-09-16 | Stop reason: SDUPTHER

## 2020-09-16 RX ORDER — FENTANYL CITRATE 50 UG/ML
50 INJECTION, SOLUTION INTRAMUSCULAR; INTRAVENOUS EVERY 5 MIN PRN
Status: DISCONTINUED | OUTPATIENT
Start: 2020-09-16 | End: 2020-09-16 | Stop reason: HOSPADM

## 2020-09-16 RX ORDER — GLYCOPYRROLATE 1 MG/5 ML
SYRINGE (ML) INTRAVENOUS PRN
Status: DISCONTINUED | OUTPATIENT
Start: 2020-09-16 | End: 2020-09-16 | Stop reason: SDUPTHER

## 2020-09-16 RX ORDER — PROPOFOL 10 MG/ML
INJECTION, EMULSION INTRAVENOUS PRN
Status: DISCONTINUED | OUTPATIENT
Start: 2020-09-16 | End: 2020-09-16 | Stop reason: SDUPTHER

## 2020-09-16 RX ORDER — HYDRALAZINE HYDROCHLORIDE 20 MG/ML
INJECTION INTRAMUSCULAR; INTRAVENOUS PRN
Status: DISCONTINUED | OUTPATIENT
Start: 2020-09-16 | End: 2020-09-16 | Stop reason: SDUPTHER

## 2020-09-16 RX ADMIN — PROPOFOL 120 MG: 10 INJECTION, EMULSION INTRAVENOUS at 13:16

## 2020-09-16 RX ADMIN — MORPHINE SULFATE 2 MG: 2 INJECTION, SOLUTION INTRAMUSCULAR; INTRAVENOUS at 00:27

## 2020-09-16 RX ADMIN — DEXAMETHASONE SODIUM PHOSPHATE 8 MG: 4 INJECTION, SOLUTION INTRAMUSCULAR; INTRAVENOUS at 13:25

## 2020-09-16 RX ADMIN — METRONIDAZOLE 500 MG: 500 INJECTION, SOLUTION INTRAVENOUS at 15:53

## 2020-09-16 RX ADMIN — FENTANYL CITRATE 50 MCG: 50 INJECTION, SOLUTION INTRAMUSCULAR; INTRAVENOUS at 13:28

## 2020-09-16 RX ADMIN — HYDROMORPHONE HYDROCHLORIDE 0.25 MG: 1 INJECTION, SOLUTION INTRAMUSCULAR; INTRAVENOUS; SUBCUTANEOUS at 14:40

## 2020-09-16 RX ADMIN — ONDANSETRON 4 MG: 2 INJECTION INTRAMUSCULAR; INTRAVENOUS at 13:27

## 2020-09-16 RX ADMIN — HYDROMORPHONE HYDROCHLORIDE 0.25 MG: 1 INJECTION, SOLUTION INTRAMUSCULAR; INTRAVENOUS; SUBCUTANEOUS at 14:24

## 2020-09-16 RX ADMIN — MORPHINE SULFATE 2 MG: 2 INJECTION, SOLUTION INTRAMUSCULAR; INTRAVENOUS at 22:50

## 2020-09-16 RX ADMIN — HYDROMORPHONE HYDROCHLORIDE 0.25 MG: 1 INJECTION, SOLUTION INTRAMUSCULAR; INTRAVENOUS; SUBCUTANEOUS at 14:35

## 2020-09-16 RX ADMIN — LIDOCAINE HYDROCHLORIDE 40 MG: 20 INJECTION, SOLUTION INTRAVENOUS at 13:16

## 2020-09-16 RX ADMIN — PROMETHAZINE HYDROCHLORIDE 6.25 MG: 25 INJECTION INTRAMUSCULAR; INTRAVENOUS at 14:31

## 2020-09-16 RX ADMIN — METRONIDAZOLE 500 MG: 500 INJECTION, SOLUTION INTRAVENOUS at 06:43

## 2020-09-16 RX ADMIN — HYDRALAZINE HYDROCHLORIDE 2 MG: 20 INJECTION INTRAMUSCULAR; INTRAVENOUS at 13:47

## 2020-09-16 RX ADMIN — TRAMADOL HYDROCHLORIDE 50 MG: 50 TABLET, FILM COATED ORAL at 15:53

## 2020-09-16 RX ADMIN — WATER 1 G: 1 INJECTION INTRAMUSCULAR; INTRAVENOUS; SUBCUTANEOUS at 17:21

## 2020-09-16 RX ADMIN — METRONIDAZOLE 500 MG: 500 INJECTION, SOLUTION INTRAVENOUS at 22:50

## 2020-09-16 RX ADMIN — SODIUM CHLORIDE, PRESERVATIVE FREE 10 ML: 5 INJECTION INTRAVENOUS at 21:02

## 2020-09-16 RX ADMIN — ROCURONIUM BROMIDE 30 MG: 10 INJECTION, SOLUTION INTRAVENOUS at 13:16

## 2020-09-16 RX ADMIN — ACETAMINOPHEN 650 MG: 325 TABLET ORAL at 21:13

## 2020-09-16 RX ADMIN — PROPOFOL 40 MG: 10 INJECTION, EMULSION INTRAVENOUS at 13:34

## 2020-09-16 RX ADMIN — Medication 3 MG: at 13:50

## 2020-09-16 RX ADMIN — SODIUM CHLORIDE, POTASSIUM CHLORIDE, SODIUM LACTATE AND CALCIUM CHLORIDE: 600; 310; 30; 20 INJECTION, SOLUTION INTRAVENOUS at 13:07

## 2020-09-16 RX ADMIN — MORPHINE SULFATE 2 MG: 2 INJECTION, SOLUTION INTRAMUSCULAR; INTRAVENOUS at 06:42

## 2020-09-16 RX ADMIN — TOPIRAMATE 25 MG: 25 TABLET, FILM COATED ORAL at 08:06

## 2020-09-16 RX ADMIN — Medication 0.6 MG: at 13:50

## 2020-09-16 RX ADMIN — HYDROMORPHONE HYDROCHLORIDE 0.25 MG: 1 INJECTION, SOLUTION INTRAMUSCULAR; INTRAVENOUS; SUBCUTANEOUS at 14:30

## 2020-09-16 RX ADMIN — FENTANYL CITRATE 50 MCG: 50 INJECTION, SOLUTION INTRAMUSCULAR; INTRAVENOUS at 13:13

## 2020-09-16 RX ADMIN — METOPROLOL SUCCINATE 25 MG: 25 TABLET, EXTENDED RELEASE ORAL at 08:06

## 2020-09-16 RX ADMIN — HYDRALAZINE HYDROCHLORIDE 4 MG: 20 INJECTION INTRAMUSCULAR; INTRAVENOUS at 13:37

## 2020-09-16 ASSESSMENT — PAIN DESCRIPTION - ORIENTATION
ORIENTATION: MID;LOWER
ORIENTATION: MID
ORIENTATION: MID
ORIENTATION: MID;LOWER

## 2020-09-16 ASSESSMENT — PAIN DESCRIPTION - ONSET
ONSET: ON-GOING

## 2020-09-16 ASSESSMENT — PAIN SCALES - GENERAL
PAINLEVEL_OUTOF10: 6
PAINLEVEL_OUTOF10: 6
PAINLEVEL_OUTOF10: 8
PAINLEVEL_OUTOF10: 0
PAINLEVEL_OUTOF10: 8
PAINLEVEL_OUTOF10: 6
PAINLEVEL_OUTOF10: 7
PAINLEVEL_OUTOF10: 10
PAINLEVEL_OUTOF10: 3
PAINLEVEL_OUTOF10: 10

## 2020-09-16 ASSESSMENT — PAIN DESCRIPTION - PROGRESSION
CLINICAL_PROGRESSION: GRADUALLY WORSENING
CLINICAL_PROGRESSION: NOT CHANGED
CLINICAL_PROGRESSION: GRADUALLY WORSENING
CLINICAL_PROGRESSION: GRADUALLY WORSENING

## 2020-09-16 ASSESSMENT — PAIN DESCRIPTION - DESCRIPTORS
DESCRIPTORS: ACHING;DISCOMFORT;DULL
DESCRIPTORS: ACHING;DISCOMFORT;SORE
DESCRIPTORS: ACHING;DISCOMFORT;DULL
DESCRIPTORS: ACHING;DISCOMFORT;DULL

## 2020-09-16 ASSESSMENT — PAIN DESCRIPTION - LOCATION
LOCATION: ABDOMEN

## 2020-09-16 ASSESSMENT — PAIN - FUNCTIONAL ASSESSMENT
PAIN_FUNCTIONAL_ASSESSMENT: PREVENTS OR INTERFERES SOME ACTIVE ACTIVITIES AND ADLS

## 2020-09-16 ASSESSMENT — PAIN DESCRIPTION - FREQUENCY
FREQUENCY: CONTINUOUS
FREQUENCY: INTERMITTENT
FREQUENCY: INTERMITTENT
FREQUENCY: CONTINUOUS

## 2020-09-16 ASSESSMENT — PAIN DESCRIPTION - PAIN TYPE
TYPE: SURGICAL PAIN
TYPE: ACUTE PAIN;SURGICAL PAIN
TYPE: ACUTE PAIN
TYPE: ACUTE PAIN;SURGICAL PAIN
TYPE: ACUTE PAIN

## 2020-09-16 ASSESSMENT — PAIN SCALES - WONG BAKER: WONGBAKER_NUMERICALRESPONSE: 2

## 2020-09-16 NOTE — PROGRESS NOTES
mg  10 mg Oral TID PRN Vinod Godfrey DO   10 mg at 09/15/20 0004    indomethacin (INDOCIN) 50 MG suppository 100 mg  100 mg Rectal See Admin Instructions Vanda AVILA Sugar, APRN - CNP   100 mg at 09/15/20 1059    [Held by provider] furosemide (LASIX) tablet 40 mg  40 mg Oral Daily Kyra Donald MD        levothyroxine (SYNTHROID) tablet 75 mcg  75 mcg Oral Daily Kyra Donald MD   Stopped at 09/15/20 0747    [Held by provider] losartan (COZAAR) tablet 50 mg  50 mg Oral Daily Kyra Donald MD   50 mg at 09/13/20 1001    metoprolol succinate (TOPROL XL) extended release tablet 25 mg  25 mg Oral Daily Kyra Donald MD   25 mg at 09/15/20 0925    pantoprazole (PROTONIX) tablet 40 mg  40 mg Oral Daily Kyra Donald MD   Stopped at 09/15/20 0747    sertraline (ZOLOFT) tablet 50 mg  50 mg Oral Daily Kyra Donald MD   Stopped at 09/15/20 0747    topiramate (TOPAMAX) tablet 25 mg  25 mg Oral Daily Kyra Donald MD   25 mg at 09/15/20 4824    lactated ringers infusion   Intravenous Continuous Kyra Donald MD 75 mL/hr at 09/15/20 1534      morphine (PF) injection 2 mg  2 mg Intravenous Q3H PRN Kyra Donald MD   2 mg at 09/16/20 0027    Or    morphine sulfate (PF) injection 4 mg  4 mg Intravenous Q3H PRN Kyra Donald MD        cefTRIAXone (ROCEPHIN) 1 g in sterile water 10 mL IV syringe  1 g Intravenous Q24H Kyra Donald MD   1 g at 09/15/20 1735    metronidazole (FLAGYL) 500 mg in NaCl 100 mL IVPB premix  500 mg Intravenous Q8H Kyra Donald MD   Stopped at 09/16/20 0022       PRN Medications  HYDROmorphone, sodium chloride flush, potassium chloride **OR** potassium alternative oral replacement **OR** potassium chloride, acetaminophen **OR** acetaminophen, senna, promethazine **OR** ondansetron, hydrOXYzine, morphine **OR** morphine    Objective  Most Recent Recorded Vitals  /69   Pulse 62   Temp 98.1 °F (36.7 °C) (Oral)   Resp 16   Ht 5' 4.5\" (1.638 m)   Wt 181 lb 12.8 oz (82.5 kg)   SpO2 97%   BMI 30.72 kg/m² I/O last 3 completed shifts: In: 80 [P.O.:210; I.V.:200]  Out: -   No intake/output data recorded. Physical Exam:  General: AAO to person/place/time/purpose, NAD, no labored breathing  Eyes: conjunctivae/corneas clear, sclera non icteric  Skin: color/texture/turgor normal, no rashes or lesions  Lungs: CTAB, no retractions/use of accessory muscles, no vocal fremitus, no rhonchi, no crackle, no rales  Heart: regular rate, regular rhythm, no murmur  Abdomen: soft, tender to palpation in epigastric and right upper quadrant, bowel sounds normal; no masses,  no organomegaly  Extremities: atraumatic, no cyanosis, no edema  Neurologic: cranial nerves 2-12 grossly intact, no slurred speech. Most Recent Labs  Lab Results   Component Value Date    WBC 6.7 09/15/2020    HGB 8.6 (L) 09/15/2020    HCT 27.2 (L) 09/15/2020     09/15/2020     09/15/2020    K 3.6 09/15/2020     09/15/2020    CREATININE 1.0 09/15/2020    BUN 8 09/15/2020    CO2 23 09/15/2020    GLUCOSE 89 09/15/2020    ALT 65 (H) 09/15/2020    AST 52 (H) 09/15/2020    INR 1.2 09/15/2020       FL ERCP BILIARY AND PANCREATIC S&I   Final Result   Fluoroscopic assistance was provided during endoscopic   evaluation of the biliary tree, and documents filling defects in the   common bile duct and gallbladder. A common bile duct stent was placed. Please refer to the procedure report for specific details. Radiation dosing of fluoroscopic time are listed above. CT ABDOMEN PELVIS WO CONTRAST Additional Contrast? None   Final Result   Distended gallbladder with wall thickening edema and multiple   gallstones with dilated common bile duct with multiple gallstones in   the common bile duct concerning for acute cholecystitis. Minimal atelectasis/infiltrates in the right lung base. Developing   pneumonia is considered.                 Assessment   Active Hospital Problems    Diagnosis    Gallstone pancreatitis [K85.10]     Priority: High    Choledocholithiasis [K80.50]     Priority: Medium    Pure hypercholesterolemia [E78.00]    Cognitive dysfunction [F09]    Moderate obesity [E66.8]    Chronic combined systolic and diastolic HF (heart failure) (H[facility]) [I50.42]    Nonischemic cardiomyopathy ([facility]) [I42.8]    History of TIA (transient ischemic attack) [Z86.73]    Essential hypertension [I10]    Hypothyroidism [E03.9]       Plan  · Gallstone pancreatitis w/ associated cholangitis: Gen surg admitted patient. ATB's. GI following-- sp ERCP 9/15. Lap alesha 9/16 at 1200. IVF (hold Lasix and ARB). Follow HFP/lipase. Zofran prn nausea. · PT/OT eventually. · Follow labs   · DVT prophylaxis  · Please see orders for further management and care. · Discharge plan: TBD pending clinical improvement     Patient seen, examined, and discussed with Dr. Hai Nielson. Electronically signed by Nieves Poole DO on 9/16/2020 at 8:12 AM     Addendum: I have personally participated in a face-to-face history and physical exam on the date of service with the patient. I have discussed the case with the resident. I also participated in medical decision making with the resident on the date of service and I agree with all of the pertinent clinical information unless indicated in my editing of the note. I have reviewed and edited the note above based on my findings during my history, exam, and decision making on the same day of service. My additional thoughts:    Cholecystectomy today     Electronically signed by Flo Keita DO on 9/16/2020 at 9:07 AM    I can be reached through [facility].

## 2020-09-16 NOTE — PROGRESS NOTES
[facility] Quality Flow/Interdisciplinary Rounds Progress Note        Quality Flow Rounds held on September 16, 2020    Disciplines Attending:  Bedside Nurse, ,  and Nursing Unit Leadership    Loni Mendoza was admitted on 9/12/2020 12:07 PM    Anticipated Discharge Date:  Expected Discharge Date: 09/12/20    Disposition:    Darrell Score:  Darrell Scale Score: 19    Readmission Risk              Risk of Unplanned Readmission:        10           Discussed patient goal for the day, patient clinical progression, and barriers to discharge.   The following Goal(s) of the Day/Commitment(s) have been identified:  Discharge [address] [place]  September 16, 2020

## 2020-09-16 NOTE — PROGRESS NOTES
PROGRESS NOTE    Patient Presents with/Seen in Consultation For      *Reason for Consult: multiple CBD stones on CT scan      CHIEF COMPLAINT: \"terrible abdominal pain, nausea, and vomiting\"    Subjective:     Patient seen Vlad Murcia in bed, no complaints at this time. Reports she is feeling much better. Has been NPO P MN, for lap alesha today. ERCP report reviewed with the patient, all questions answered. Review of Systems  Aside from what was mentioned in the PMH and HPI, essentially unremarkable, all others negative. Objective:     Patient Vitals for the past 8 hrs:   BP Temp Temp src Pulse Resp SpO2   09/16/20 0754 133/71 98.2 °F (36.8 °C) Oral 61 16 94 %       General appearance: alert, awake, laying in bed, and cooperative  Eyes: conjunctivae/corneas clear. PERRL.   Lungs: clear to auscultation bilaterally  Heart: regular rate and rhythm, no murmur, 2+ pulses;  without edema  Abdomen: softly distended,  non-tender; bowel sounds normal; no masses,  no organomegaly  Extremities: extremities without edema  Pulses: 2+ and symmetric  Skin: Skin color, texture, turgor normal.   Neurologic: Grossly normal    HYDROmorphone (DILAUDID) injection 0.5 mg, Q4H PRN  sodium chloride flush 0.9 % injection 10 mL, 2 times per day  sodium chloride flush 0.9 % injection 10 mL, PRN  potassium chloride (KLOR-CON M) extended release tablet 40 mEq, PRN    Or  potassium bicarb-citric acid (EFFER-K) effervescent tablet 40 mEq, PRN    Or  potassium chloride 10 mEq/100 mL IVPB (Peripheral Line), PRN  acetaminophen (TYLENOL) tablet 650 mg, Q6H PRN    Or  acetaminophen (TYLENOL) suppository 650 mg, Q6H PRN  senna (SENOKOT) tablet 8.6 mg, Daily PRN  promethazine (PHENERGAN) tablet 12.5 mg, Q6H PRN    Or  ondansetron (ZOFRAN) injection 4 mg, Q6H PRN  enoxaparin (LOVENOX) injection 40 mg, Daily  hydrOXYzine (ATARAX) tablet 10 mg, TID PRN  indomethacin (INDOCIN) 50 MG suppository 100 mg, See Admin Instructions  [Held by provider] furosemide (LASIX) tablet 40 mg, Daily  levothyroxine (SYNTHROID) tablet 75 mcg, Daily  [Held by provider] losartan (COZAAR) tablet 50 mg, Daily  metoprolol succinate (TOPROL XL) extended release tablet 25 mg, Daily  pantoprazole (PROTONIX) tablet 40 mg, Daily  sertraline (ZOLOFT) tablet 50 mg, Daily  topiramate (TOPAMAX) tablet 25 mg, Daily  lactated ringers infusion, Continuous  morphine (PF) injection 2 mg, Q3H PRN    Or  morphine sulfate (PF) injection 4 mg, Q3H PRN  cefTRIAXone (ROCEPHIN) 1 g in sterile water 10 mL IV syringe, Q24H  metronidazole (FLAGYL) 500 mg in NaCl 100 mL IVPB premix, Q8H         Data Review  CBC:   Lab Results   Component Value Date    WBC 7.9 09/16/2020    RBC 3.53 09/16/2020    HGB 9.1 09/16/2020    HCT 28.9 09/16/2020    MCV 81.9 09/16/2020    MCH 25.8 09/16/2020    MCHC 31.5 09/16/2020    RDW 16.7 09/16/2020     09/16/2020    MPV 10.0 09/16/2020     CMP:    Lab Results   Component Value Date     09/16/2020    K 3.7 09/16/2020     09/16/2020    CO2 25 09/16/2020    BUN 7 09/16/2020    CREATININE 0.9 09/16/2020    GFRAA >60 09/16/2020    LABGLOM 60 09/16/2020    GLUCOSE 89 09/16/2020    PROT 5.5 09/16/2020    LABALBU 2.5 09/16/2020    CALCIUM 8.8 09/16/2020    BILITOT 1.0 09/16/2020    ALKPHOS 350 09/16/2020    AST 49 09/16/2020    ALT 58 09/16/2020     Hepatic Function Panel:    Lab Results   Component Value Date    ALKPHOS 350 09/16/2020    ALT 58 09/16/2020    AST 49 09/16/2020    PROT 5.5 09/16/2020    BILITOT 1.0 09/16/2020    BILIDIR 3.6 09/12/2020    IBILI 0.4 09/12/2020    LABALBU 2.5 09/16/2020       PT/INR:    Lab Results   Component Value Date    PROTIME 13.5 09/15/2020    INR 1.2 09/15/2020       Assessment:     Principal Problem:  · Abdominal pain- upper  · Nausea with vomiting  · Elevated LFTs  · Gallstones W/I CBD  · Hyperbilirubinemia   · Acute cholecystitis  · Constipation  · Gallstone pancreatitis  · Anemia, normocytic  · PMH of gastric ulcers  · ERCP 9/15/50: ERCP with papillotomy and four stones extraction and Wallstent placement.       Plan:     · NPO for lap alesha today  · Medical management per Primary Care  · Continue to medicate for nausea as ordered  · Need Colonoscopy completed as a OP; office to arrange  · Continue IV antibiotic per Surgery's orders  · Continue Protonix as ordered  · Trend labs  · Will follow    Discussed with Dr. Gerard Britt per Dr. Dariel Bloom, NP-C 9/16/2020 8:30 AM For Dr. Trevor Queen

## 2020-09-16 NOTE — PLAN OF CARE
Problem: Falls - Risk of:  Goal: Will remain free from falls  Description: Will remain free from falls  9/15/2020 2225 by Susana Gtz  Outcome: Met This Shift  9/15/2020 1713 by Maribel Marin RN  Outcome: Met This Shift  Goal: Absence of physical injury  Description: Absence of physical injury  9/15/2020 2225 by Susana Gtz  Outcome: Met This Shift  9/15/2020 1713 by Maribel Marin RN  Outcome: Met This Shift     Problem: Pain:  Goal: Pain level will decrease  Description: Pain level will decrease  9/15/2020 2225 by Susana Gtz  Outcome: Met This Shift  9/15/2020 1713 by Maribel Marin RN  Outcome: Met This Shift  Goal: Control of acute pain  Description: Control of acute pain  Outcome: Met This Shift

## 2020-09-16 NOTE — PLAN OF CARE
Problem: Falls - Risk of:  Goal: Will remain free from falls  Description: Will remain free from falls  9/16/2020 0957 by Valente Coleman, RN  Outcome: Met This Shift     Problem: Falls - Risk of:  Goal: Absence of physical injury  Description: Absence of physical injury  9/16/2020 0957 by Valente Coleman, RN  Outcome: Met This Shift     Problem: Pain:  Goal: Pain level will decrease  Description: Pain level will decrease  9/16/2020 0957 by Valente Coleman, RN  Outcome: Met This Shift

## 2020-09-16 NOTE — PROGRESS NOTES
OK to give morning metoprolol and topamax per anesthesia.  Electronically signed by Rodrigo Nieto RN on 9/16/2020 at 8:01 AM

## 2020-09-16 NOTE — OP NOTE
Operative Note      Patient: Celina Torres  YOB: 1940  MRN: [id number]    Date of Procedure: 9/16/2020    Pre-Op Diagnosis: gallstone pancreatitis    Post-Op Diagnosis: Same       Procedure(s):  LAPAROSCOPIC CHOLECYSTECTOMY    Surgeon(s):  Vargas Dunlap MD    Assistant:   Resident: Leah Roberto MD    Anesthesia: General    Estimated Blood Loss (mL): 15    Complications: None    Specimens:   ID Type Source Tests Collected by Time Destination   A : gallbladder Tissue Gallbladder SURGICAL PATHOLOGY Vargas Dunlap MD 9/16/2020 1331        Implants:  * No implants in log *      Drains: * No LDAs found *    Findings: cholecystitis    HISTORY: Celina Torres is a 78 y.o. female who presented with abdominal pain. Workup revealed choledocholithiasis. She underwent ERCP with removal of CBD stones as well as CBD stenting which was uneventful. Laparoscopic cholecystectomy was recommended. The risks benefits and alternatives of the procedure were discussed with the patient who stated understanding and agreed to proceed. DESCRIPTION OF PROCEDURE: The patient was brought to the operating room and positioned supine on the OR table. Sequential compression devices were placed on the patient's lower extremities and functioning. Preoperative antibiotics were administered. Anesthesia was obtained without complication as per the anesthesia record. Immediately prior to the procedure a time-out was called and the surgical checklist was reviewed and agreed upon by all present. The patient was prepped and draped in the usual sterile fashion. A 5mm supraumbilical incision was made with a #11 blade. A Veress needle was inserted and confirmed to be in place with the saline drip test. The abdomen was insufflated to 15 mmHg. A 5 mm port was then inserted. A camera was then inserted through the port. The abdomen was inspected. There was no injury from accessing the abdomen.  The patient was then positioned in reverse extubated prior to leaving the OR. She was transferred to the recovery area in good condition. She will return to the nursing floor for further recovery.     Electronically signed by Luann Escobedo MD on 9/16/2020 at 2:10 PM

## 2020-09-17 VITALS
DIASTOLIC BLOOD PRESSURE: 59 MMHG | WEIGHT: 181.8 LBS | HEART RATE: 60 BPM | RESPIRATION RATE: 16 BRPM | HEIGHT: 65 IN | OXYGEN SATURATION: 97 % | TEMPERATURE: 98.1 F | BODY MASS INDEX: 30.29 KG/M2 | SYSTOLIC BLOOD PRESSURE: 128 MMHG

## 2020-09-17 LAB
ALBUMIN SERPL-MCNC: 2.4 G/DL (ref 3.5–5.2)
ALP BLD-CCNC: 346 U/L (ref 35–104)
ALT SERPL-CCNC: 64 U/L (ref 0–32)
AMYLASE: 19 U/L (ref 20–100)
ANION GAP SERPL CALCULATED.3IONS-SCNC: 10 MMOL/L (ref 7–16)
AST SERPL-CCNC: 99 U/L (ref 0–31)
BASOPHILS ABSOLUTE: 0.03 E9/L (ref 0–0.2)
BASOPHILS RELATIVE PERCENT: 0.2 % (ref 0–2)
BILIRUB SERPL-MCNC: 0.8 MG/DL (ref 0–1.2)
BUN BLDV-MCNC: 9 MG/DL (ref 8–23)
CALCIUM SERPL-MCNC: 9.3 MG/DL (ref 8.6–10.2)
CHLORIDE BLD-SCNC: 102 MMOL/L (ref 98–107)
CO2: 24 MMOL/L (ref 22–29)
CREAT SERPL-MCNC: 0.9 MG/DL (ref 0.5–1)
EOSINOPHILS ABSOLUTE: 0 E9/L (ref 0.05–0.5)
EOSINOPHILS RELATIVE PERCENT: 0 % (ref 0–6)
GFR AFRICAN AMERICAN: >60
GFR NON-AFRICAN AMERICAN: 60 ML/MIN/1.73
GLUCOSE BLD-MCNC: 106 MG/DL (ref 74–99)
HCT VFR BLD CALC: 30.4 % (ref 34–48)
HEMOGLOBIN: 9.7 G/DL (ref 11.5–15.5)
IMMATURE GRANULOCYTES #: 0.1 E9/L
IMMATURE GRANULOCYTES %: 0.8 % (ref 0–5)
LIPASE: 27 U/L (ref 13–60)
LYMPHOCYTES ABSOLUTE: 0.81 E9/L (ref 1.5–4)
LYMPHOCYTES RELATIVE PERCENT: 6.6 % (ref 20–42)
MCH RBC QN AUTO: 26.1 PG (ref 26–35)
MCHC RBC AUTO-ENTMCNC: 31.9 % (ref 32–34.5)
MCV RBC AUTO: 81.9 FL (ref 80–99.9)
MONOCYTES ABSOLUTE: 0.66 E9/L (ref 0.1–0.95)
MONOCYTES RELATIVE PERCENT: 5.4 % (ref 2–12)
MRSA CULTURE ONLY: NORMAL
NEUTROPHILS ABSOLUTE: 10.72 E9/L (ref 1.8–7.3)
NEUTROPHILS RELATIVE PERCENT: 87 % (ref 43–80)
PDW BLD-RTO: 16.7 FL (ref 11.5–15)
PLATELET # BLD: 233 E9/L (ref 130–450)
PMV BLD AUTO: 10.1 FL (ref 7–12)
POTASSIUM REFLEX MAGNESIUM: 4.1 MMOL/L (ref 3.5–5)
RBC # BLD: 3.71 E12/L (ref 3.5–5.5)
SODIUM BLD-SCNC: 136 MMOL/L (ref 132–146)
TOTAL PROTEIN: 5.5 G/DL (ref 6.4–8.3)
WBC # BLD: 12.3 E9/L (ref 4.5–11.5)

## 2020-09-17 PROCEDURE — 97168 OT RE-EVAL EST PLAN CARE: CPT

## 2020-09-17 PROCEDURE — 97535 SELF CARE MNGMENT TRAINING: CPT

## 2020-09-17 PROCEDURE — 36415 COLL VENOUS BLD VENIPUNCTURE: CPT

## 2020-09-17 PROCEDURE — 6370000000 HC RX 637 (ALT 250 FOR IP): Performed by: INTERNAL MEDICINE

## 2020-09-17 PROCEDURE — 85025 COMPLETE CBC W/AUTO DIFF WBC: CPT

## 2020-09-17 PROCEDURE — 6360000002 HC RX W HCPCS: Performed by: INTERNAL MEDICINE

## 2020-09-17 PROCEDURE — 2500000003 HC RX 250 WO HCPCS: Performed by: INTERNAL MEDICINE

## 2020-09-17 PROCEDURE — 82150 ASSAY OF AMYLASE: CPT

## 2020-09-17 PROCEDURE — 2580000003 HC RX 258: Performed by: INTERNAL MEDICINE

## 2020-09-17 PROCEDURE — 6370000000 HC RX 637 (ALT 250 FOR IP): Performed by: STUDENT IN AN ORGANIZED HEALTH CARE EDUCATION/TRAINING PROGRAM

## 2020-09-17 PROCEDURE — 97161 PT EVAL LOW COMPLEX 20 MIN: CPT

## 2020-09-17 PROCEDURE — 80053 COMPREHEN METABOLIC PANEL: CPT

## 2020-09-17 PROCEDURE — 83690 ASSAY OF LIPASE: CPT

## 2020-09-17 PROCEDURE — 97530 THERAPEUTIC ACTIVITIES: CPT

## 2020-09-17 PROCEDURE — 2700000000 HC OXYGEN THERAPY PER DAY

## 2020-09-17 RX ADMIN — SERTRALINE HYDROCHLORIDE 50 MG: 50 TABLET ORAL at 08:39

## 2020-09-17 RX ADMIN — ENOXAPARIN SODIUM 40 MG: 40 INJECTION SUBCUTANEOUS at 08:39

## 2020-09-17 RX ADMIN — SODIUM CHLORIDE, PRESERVATIVE FREE 10 ML: 5 INJECTION INTRAVENOUS at 08:40

## 2020-09-17 RX ADMIN — PANTOPRAZOLE SODIUM 40 MG: 40 TABLET, DELAYED RELEASE ORAL at 08:39

## 2020-09-17 RX ADMIN — SODIUM CHLORIDE, PRESERVATIVE FREE 10 ML: 5 INJECTION INTRAVENOUS at 06:46

## 2020-09-17 RX ADMIN — METRONIDAZOLE 500 MG: 500 INJECTION, SOLUTION INTRAVENOUS at 06:46

## 2020-09-17 RX ADMIN — TOPIRAMATE 25 MG: 25 TABLET, FILM COATED ORAL at 08:39

## 2020-09-17 RX ADMIN — TRAMADOL HYDROCHLORIDE 50 MG: 50 TABLET, FILM COATED ORAL at 08:40

## 2020-09-17 RX ADMIN — METOPROLOL SUCCINATE 25 MG: 25 TABLET, EXTENDED RELEASE ORAL at 08:39

## 2020-09-17 RX ADMIN — LEVOTHYROXINE SODIUM 75 MCG: 75 TABLET ORAL at 08:39

## 2020-09-17 ASSESSMENT — PAIN SCALES - GENERAL
PAINLEVEL_OUTOF10: 3
PAINLEVEL_OUTOF10: 8

## 2020-09-17 NOTE — PROGRESS NOTES
Internal Medicine Progress Note    Patient's name: Hardeep Lux  : 1940  Admission date: 2020  Date of service: 2020   Room: [address] SURGERY  Primary care physician: Bia Castellano MD    Malini Mijares was seen and examined at bedside. She was resting comfortably in bed. She tolerated cholecystectomy well yesterday. She denies any complaints or issues at this time. She is tolerating all current treatments and medications. Her abdominal pain is appropriate for the situation. Review of Systems  There are no new complaints of chest pain, shortness of breath, vomiting, diarrhea, constipation.     Hospital Medications  Current Facility-Administered Medications   Medication Dose Route Frequency Provider Last Rate Last Dose    traMADol (ULTRAM) tablet 50 mg  50 mg Oral Q6H PRN Indiana Gee MD   50 mg at 20 0840    HYDROmorphone (DILAUDID) injection 0.5 mg  0.5 mg Intravenous Q4H PRN Indiana Gee MD        sodium chloride flush 0.9 % injection 10 mL  10 mL Intravenous 2 times per day Mando Hodgson MD   10 mL at 20 0840    sodium chloride flush 0.9 % injection 10 mL  10 mL Intravenous PRN Mando Hodgson MD   10 mL at 20 0646    potassium chloride (KLOR-CON M) extended release tablet 40 mEq  40 mEq Oral PRN Mando Hodgson MD        Or    potassium bicarb-citric acid (EFFER-K) effervescent tablet 40 mEq  40 mEq Oral PRN Mando Hodgson MD        Or    potassium chloride 10 mEq/100 mL IVPB (Peripheral Line)  10 mEq Intravenous PRN Mando Hodgson MD        acetaminophen (TYLENOL) tablet 650 mg  650 mg Oral Q6H PRN Mando Hodgson MD   650 mg at 20    Or    acetaminophen (TYLENOL) suppository 650 mg  650 mg Rectal Q6H PRN Mando Hodgson MD        [facility]) tablet 8.6 mg  1 tablet Oral Daily PRN Mando Hodgson MD        [facility]ron [facility]) injection 4 mg  4 mg Intravenous Q6H PRN Mando Hodgson MD   4 mg at 20 7690    enoxaparin (LOVENOX) injection 40 mg  40 mg Subcutaneous Daily Tabby Go MD   40 mg at 09/17/20 0839    hydrOXYzine (ATARAX) tablet 10 mg  10 mg Oral TID PRN Tabby Go MD   10 mg at 09/15/20 0004    indomethacin (INDOCIN) 50 MG suppository 100 mg  100 mg Rectal See Admin Instructions Tabby Go MD   100 mg at 09/15/20 1059    furosemide (LASIX) tablet 40 mg  40 mg Oral Daily Lona Castro MD        levothyroxine (SYNTHROID) tablet 75 mcg  75 mcg Oral Daily Tabby Go MD   75 mcg at 09/17/20 0839    losartan (COZAAR) tablet 50 mg  50 mg Oral Daily Lona Castro MD   50 mg at 09/13/20 1001    metoprolol succinate (TOPROL XL) extended release tablet 25 mg  25 mg Oral Daily Tabby Go MD   25 mg at 09/17/20 0839    pantoprazole (PROTONIX) tablet 40 mg  40 mg Oral Daily Tabby Go MD   40 mg at 09/17/20 0839    sertraline (ZOLOFT) tablet 50 mg  50 mg Oral Daily Tabby Go MD   50 mg at 09/17/20 0839    topiramate (TOPAMAX) tablet 25 mg  25 mg Oral Daily Tabby Go MD   25 mg at 09/17/20 0839    morphine (PF) injection 2 mg  2 mg Intravenous Q3H PRN Tabby Go MD   2 mg at 09/16/20 2250    Or    morphine sulfate (PF) injection 4 mg  4 mg Intravenous Q3H PRN Tabby Go MD        cefTRIAXone (ROCEPHIN) 1 g in sterile water 10 mL IV syringe  1 g Intravenous Q24H Tabby Go MD   1 g at 09/16/20 1721    metronidazole (FLAGYL) 500 mg in NaCl 100 mL IVPB premix  500 mg Intravenous Jason Aquino MD   Stopped at 09/17/20 0817       PRN Medications  traMADol, HYDROmorphone, sodium chloride flush, potassium chloride **OR** potassium alternative oral replacement **OR** potassium chloride, acetaminophen **OR** acetaminophen, senna, [DISCONTINUED] promethazine **OR** ondansetron, hydrOXYzine, morphine **OR** morphine    Objective  Most Recent Recorded Vitals  /60   Pulse 69   Temp 97.9 °F (36.6 °C)   Resp 16   Ht 5' 4.5\" (1.638 m)   Wt 181 lb 12.8 oz (82.5 kg)   SpO2 96%   BMI 30.72 kg/m²   I/O last 3 completed shifts: In: 800 [I.V.:800]  Out: 305 [Urine:300; Blood:5]  No intake/output data recorded. Physical Exam:  General: AAO to person/place/time/purpose, NAD, no labored breathing  Eyes: conjunctivae/corneas clear, sclera non icteric  Skin: color/texture/turgor normal, no rashes or lesions  Lungs: CTAB, no retractions/use of accessory muscles, no vocal fremitus, no rhonchi, no crackle, no rales  Heart: regular rate, regular rhythm, no murmur  Abdomen: soft, appropriate TTP RUQ, status post cholecystectomy, bowel sounds normal; no masses,  no organomegaly  Extremities: atraumatic, no cyanosis, no edema  Neurologic: cranial nerves 2-12 grossly intact, no slurred speech. Most Recent Labs  Lab Results   Component Value Date    WBC 12.3 (H) 09/17/2020    HGB 9.7 (L) 09/17/2020    HCT 30.4 (L) 09/17/2020     09/17/2020     09/17/2020    K 4.1 09/17/2020     09/17/2020    CREATININE 0.9 09/17/2020    BUN 9 09/17/2020    CO2 24 09/17/2020    GLUCOSE 106 (H) 09/17/2020    ALT 64 (H) 09/17/2020    AST 99 (H) 09/17/2020    INR 1.2 09/15/2020       FL ERCP BILIARY AND PANCREATIC S&I   Final Result   Fluoroscopic assistance was provided during endoscopic   evaluation of the biliary tree, and documents filling defects in the   common bile duct and gallbladder. A common bile duct stent was placed. Please refer to the procedure report for specific details. Radiation dosing of fluoroscopic time are listed above. CT ABDOMEN PELVIS WO CONTRAST Additional Contrast? None   Final Result   Distended gallbladder with wall thickening edema and multiple   gallstones with dilated common bile duct with multiple gallstones in   the common bile duct concerning for acute cholecystitis. Minimal atelectasis/infiltrates in the right lung base. Developing   pneumonia is considered.                 Assessment   Active Hospital Problems Diagnosis    Gallstone pancreatitis [K85.10]     Priority: High    Choledocholithiasis [K80.50]     Priority: Medium    Pure hypercholesterolemia [E78.00]    Cognitive dysfunction [F09]    Moderate obesity [E66.8]    Chronic combined systolic and diastolic HF (heart failure) (HCC) [I50.42]    Nonischemic cardiomyopathy (HCC) [I42.8]    History of TIA (transient ischemic attack) [Z86.73]    Essential hypertension [I10]    Hypothyroidism [E03.9]       Plan  · Gallstone pancreatitis w/ associated cholangitis: Gen surg admitted patient. ATB's per gen surg. GI following-- sp ERCP 9/15. Sp Lap alesha 9/16. DCed IVF (restart Lasix and ARB). Follow HFP/lipase. Zofran prn nausea. · PT/OT eventually. · Follow labs   · DVT prophylaxis  · Please see orders for further management and care. · Discharge plan: Okay for discharge home from my point of view as soon as today-defer to the surgical service who is the admitting service    Electronically signed by Angelica Leblanc DO on 9/17/2020 at 10:23 AM    I can be reached through [employer].

## 2020-09-17 NOTE — PROGRESS NOTES
Physical Therapy    Facility/Department: [facility] SURGERY  Initial Assessment    NAME: Marlene Newell  : 1940  MRN: [id number]    Date of Service: 2020       REQUIRES PT FOLLOW UP: Yes       Patient Diagnosis(es): The primary encounter diagnosis was Choledocholithiasis. Diagnoses of Acute pancreatitis, unspecified complication status, unspecified pancreatitis type, Transaminitis, and Urinary tract infection without hematuria, site unspecified were also pertinent to this visit. has a past medical history of History of TIA (transient ischemic attack), Hypertension, and Thyroid disease. has a past surgical history that includes Thyroid lobectomy; knee surgery; ERCP (N/A, 9/15/2020); and Cholecystectomy, laparoscopic (N/A, 2020). Evaluating Therapist: Charleen Roy, PT     Referring Provider:  Dr. Obey Luna #:  [id number]   DIAGNOSIS: choledocholithiasis s/p lap alesha   PRECAUTIONS: falls     Social:  Pt lives with  family in a  2  floor plan    Prior to admission pt walked with  [employer] [place]      Initial Evaluation  Date: 2020 Treatment      Short Term/ Long Term   Goals   Was pt agreeable to Eval/treatment? yes      Does pt have pain?  belly, back, R shoulder      Bed Mobility  Rolling:  SBA   Supine to sit:  SBA   Sit to supine:  SBA   Scooting:  SBA    independent    Transfers Sit to stand:  SBA   Stand to sit:  SBA   Stand pivot:  SBA/CGA    independent    Ambulation     15 feet and 50 feet x 1 with  Cane  with SBA/CGA    150  feet with  AAD  with  Independent, no LOB        Stair negotiation: ascended and descended NT    4-12  steps with  1 rail with  SBA    LE ROM  WFL     LE strength  4/ 5      AM- PAC RAW score   18 24           Pt is alert and Oriented x  3      Balance:  SBA/CGA , fall risk   Endurance: decreased   Bed/Chair alarm: Yes      ASSESSMENT  Pt displays functional ability as noted in the objective portion of this evaluation.           Treatment/Education: Mobility as above. Pt required increased time with mobility due to pain and just waking up. Cues for safe and proper technique with all mobility, however pt not receptive to suggestions ( ww vs cane due to unsteadiness, proper bed mobility to decrease pain )     Pt educated on fall risk, PT POC        Patient response to education:   Pt verbalized understanding Pt demonstrated skill Pt requires further education in this area   x  no  x       Comments:  Pt left  In bed per pt request after session, with call light in reach. Rehab potential is Good for reaching above PT goals. Pts/ family goals   1. None stated     Patient and or family understand(s) diagnosis, prognosis, and plan of care. -  Yes     PLAN  PT care will be provided in accordance with the objectives noted above. Whenever appropriate, clear delegation orders will be provided for nursing staff. Exercises and functional mobility practice will be used as well as appropriate assistive devices or modalities to obtain goals. Patient and family education will also be administered as needed. PLAN OF CARE:    Current Treatment Recommendations     [x] Strengthening     [] ROM   [] Balance Training   [x] Endurance Training   [x] Transfer Training   [x] Gait Training   [x] Stair Training   [] Positioning   [x] Safety and Education Training   [x] Patient/Caregiver Education   [] HEP  [] Other       Frequency of treatments will be 2-5x/week x  5 days. Time out: 0838       Evaluation Time includes thorough review of current medical information, gathering information on past medical history/social history and prior level of function, completion of standardized testing/informal observation of tasks, assessment of data and education on plan of care and goals.     CPT codes:  [x] Low Complexity PT evaluation 82019  [] Moderate Complexity PT evaluation 54145  [] High Complexity PT evaluation 77547  [] PT Re-evaluation [id number]  [] Gait training 40560 minutes  [] Therapeutic activities 81791  minutes  [] Therapeutic exercises 19426  minutes  [] Neuromuscular reeducation 68762  minutes       Aidan 18 number:  PT 3790

## 2020-09-17 NOTE — PROGRESS NOTES
Occupational Therapy  OCCUPATIONAL THERAPY RE EVALUATION      Date:2020  Patient Name: Vinod Rincon  MRN: [id number]  : 1940  Room: [address] [place], [place] [address]    Re-evaluation indicated s/p lap alesha    Referring Provider: Alesha Bliss DO    Evaluating OT: Marvin Jones OTR/L [id number]    AM-PAC Daily Activity Raw Score: 1924    Recommended Adaptive Equipment: TBD    Diagnosis: choledocholithiasis. Pt presents to ED with abdominal pain  Surgery:  lap alesha   Pertinent Medical History: HTN   Precautions:  Falls, abdominal splinting     Home Living: Pt lives with family in a 2 story home with B/B 2nd floor, bathroom available on 1st floor. Bathroom setup: tub/shower combo, standard commode      Prior Level of Function: Mod I with ADLs, family assists with IADLs; completed functional mobility with SPC  Driving: No    Pain Level: abdominal pain with movement, chronic back pain and R shoulder pain. Nursing aware of pain complaints and managing    Cognition: A&O: . Pt is impulsive and has her own way of doing things.  Minimally receptive to recommendations from therapist.    Problem solving:  Fair   Judgement/safety:  Fair     Functional Assessment:   RE-Eval Status  Date: 20 Treatment session:  Short Term Goals  Treatment frequency: 1-3x/wk PRN x1-3 wks     Feeding Independent     Grooming SBA  Standing sink level for hand hygiene, brushing teeth and combing hair  Mod I   UB Dressing Set up  Mod I   LB Dressing Min A  Donning/doffing brief and management of socks  Increased time and cues to complete compensatory techniques  Mod I    Bathing Min A  Mod I   Toileting Min A  Use of grab bar for support in transfer  Assist in brief management  Able to manage per icare  Mod I   Bed Mobility  Supine <> sit: SBA     Functional Transfers STS: SBA  Mod I   Functional Mobility CGA with SPC  Household distance  Mildly impulsive in movements  Mod I during ADLs   Balance Sitting: fair plus    Standing: fair minus SPC     Activity Tolerance Fair minus  standing carlos x6-7 min with fair plus balance during self care tasks             Treatment: Patient educated on techniques for completion of ADL, safe functional transfers and functional mobility. Patient required cues for follow through with proper hand/foot placement, pacing, safety, precautions and technique in bed mobility, functional transfers, functional mobility, toileting, grooming and LB dressing in preparation for maximum independence in all self care tasks.      Hand Dominance: Right []  Left []   Strength ROM Additional Info:    RUE  4/5 WFL good  and FMC/dexterity noted during ADL tasks     LUE 4/5 WFL good  and FMC/dexterity noted during ADL tasks         Hearing: WFL   Vision: WFL   Sensation:  No c/o numbness or tingling   Tone: WFL   Edema: none                             Long Term Goal (1-3 wks): Pt will maximize functional performance in all self care tasks/functional transfers with good follow through of all trained techniques for safe transition to next level of care    Eval Complexity: Low    Assessment of current deficits   Functional mobility [x]  ADLs [x] Strength [x]  Cognition []  Functional transfers  [x] IADLs [x] Safety Awareness [x]  Endurance [x]  Fine Motor Coordination [] Balance [x] Vision/perception [] Sensation []   Gross Motor Coordination [] ROM [] Delirium []                  Motor Control []    Plan of Care:   [x] ADL retraining/AE recommendations specific to diagnosis s/p lap alesha  [x] Energy Conservation Techniques/Strategies      [] Neuromuscular Re-Education      [x] Functional Transfer Training         [x] Functional Mobility Training          [] Cognitive Re-Training         [] Splinting/Positioning Needs           [x] Therapeutic Activity   [x]Therapeutic Exercise   [] Visual/Perceptual  [x] Delirium Prevention/Treatment   [x] Positioning to Improve Functional [place], Safety, and Skin Integrity   [x] Patient and/or Family Education to Increase Safety and Functional Traill   [] Other:     Rehab Potential: Fair for established goals, pt is minimally receptive to training from OT     Patient / Family Goal: To get home. Patient and/or family were instructed on functional diagnosis, prognosis/goals and OT plan of care. Pt verbalized understanding. Upon arrival, patient supine in bed. At end of session, patient supine in bed with call light and phone within reach, all lines and tubes intact. Pt would benefit from continued skilled OT to increase safety and independence with completion of ADL/IADL tasks for functional independence and quality of life. Bed/chair alarm:  On    Re- Evaluation  Time In: 078   Time Out: 838      Evaluation time includes thorough review of current medical information, gathering information on past medical history/social history and prior level of function, completion of standardized testing/informal observation of tasks, assessment of data, and development of POC/Goals    Rula Carmelina OTR/L  [id number]

## 2020-09-17 NOTE — CARE COORDINATION
Updated discharge plan of care. POD#1. Advance diet as tolerated. Continue iv rocephin and flagyl.  LR 75 cc hr. Plan remains home with no needs-MJO

## 2020-09-17 NOTE — CARE COORDINATION
Social Work/Discharge Planning:  Chart reviewed. Patient is POD #1 cholecystectomy. Met with patient and confirmed plan remains home with no home health care. Will continue to follow.   Electronically signed by ALLAN Cates on 9/17/2020 at 10:49 AM

## 2020-09-17 NOTE — PROGRESS NOTES
GENERAL SURGERY  DAILY PROGRESS NOTE  9/17/2020    Chief Complaint   Patient presents with    Abdominal Pain     mid abdominal pain    Nausea       Subjective:  Pt states she has no pain currently and the pain medications are helping.      Objective:  BP (!) 155/83   Pulse 76   Temp 98.2 °F (36.8 °C) (Oral)   Resp 16   Ht 5' 4.5\" (1.638 m)   Wt 181 lb 12.8 oz (82.5 kg)   SpO2 96%   BMI 30.72 kg/m²     GENERAL:  Laying in bed, awake, alert, cooperative, no apparent distress  HEAD: Normocephalic, atraumatic  EYES: No sclera icterus, pupils equal  LUNGS:  No increased work of breathing  CARDIOVASCULAR:  RR  ABDOMEN:  Soft, appropriate TTP around incisions, incisions c/d/i with minimal bruising around superior incision, non-distended  EXTREMITIES: No edema or swelling  SKIN: Warm and dry    Assessment/Plan:  78 y.o. female with gallstone pancreatitis and cholangitis, ERCP passed 4 large stones, s/p laparoscopic cholecystectomy POD1    - Continue Rocephin/Flagyl  - Okay for low fat diet   - Trend LFTs  - Pain control PRN  - Antiemetic PRN    Electronically signed by Aj Sultana MD on 9/17/2020 at 4:52 AM

## 2020-09-17 NOTE — PROGRESS NOTES
PROGRESS NOTE    Patient Presents with/Seen in Consultation For      *Reason for Consult: multiple CBD stones on CT scan      CHIEF COMPLAINT: \"terrible abdominal pain, nausea, and vomiting\"    Subjective:     Patient seen sitting at [facility], dressed ready for discharge. States she is feeling well. Slight abdominal pain from her surgery. Tolerating diet. POC reviewed with the patient and daughter, all questions answered. Review of Systems  Aside from what was mentioned in the PMH and HPI, essentially unremarkable, all others negative. Objective:     Patient Vitals for the past 8 hrs:   BP Temp Temp src Pulse Resp SpO2   09/17/20 1200 (!) 128/59 98.1 °F (36.7 °C) Oral 60 16 97 %   09/17/20 0715 121/60 97.9 °F (36.6 °C) -- 69 16 96 %   09/17/20 0545 132/60 98.3 °F (36.8 °C) Oral 61 16 --       General appearance: alert, awake, sitting at [facility], daughter present, and cooperative  Eyes: conjunctivae/corneas clear. PERRL.   Lungs: clear to auscultation bilaterally  Heart: regular rate and rhythm, no murmur, 2+ pulses;  without edema  Abdomen: softly distended,  non-tender; bowel sounds normal; lap sites D/I- bruising  Extremities: extremities without edema  Pulses: 2+ and symmetric  Skin: Skin color, texture, turgor normal.   Neurologic: Grossly normal    traMADol (ULTRAM) tablet 50 mg, Q6H PRN  HYDROmorphone (DILAUDID) injection 0.5 mg, Q4H PRN  sodium chloride flush 0.9 % injection 10 mL, 2 times per day  sodium chloride flush 0.9 % injection 10 mL, PRN  potassium chloride (KLOR-CON M) extended release tablet 40 mEq, PRN    Or  potassium bicarb-citric acid (EFFER-K) effervescent tablet 40 mEq, PRN    Or  potassium chloride 10 mEq/100 mL IVPB (Peripheral Line), PRN  acetaminophen (TYLENOL) tablet 650 mg, Q6H PRN    Or  acetaminophen (TYLENOL) suppository 650 mg, Q6H PRN  senna (SENOKOT) tablet 8.6 mg, Daily PRN  ondansetron (ZOFRAN) injection 4 mg, Q6H PRN  enoxaparin (LOVENOX) injection 40 mg, Daily  hydrOXYzine (ATARAX)

## 2020-09-17 NOTE — PATIENT CARE CONFERENCE
[facility] Quality Flow/Interdisciplinary Rounds Progress Note        Quality Flow Rounds held on September 17, 2020    Disciplines Attending:  Bedside Nurse, ,  and Nursing Unit Leadership    Linette Morales was admitted on 9/12/2020 12:07 PM    Anticipated Discharge Date:  Expected Discharge Date: 09/12/20    Disposition:    Darrell Score:  Darrell Scale Score: 19    Readmission Risk              Risk of Unplanned Readmission:        10           Discussed patient goal for the day, patient clinical progression, and barriers to discharge. The following Goal(s) of the Day/Commitment(s) have been identified:  Continued if antibiotic therapy. Comfort measures/pain control.       Pia Andraed  September 17, 2020

## 2020-09-17 NOTE — PLAN OF CARE
Problem: Falls - Risk of:  Goal: Will remain free from falls  Description: Will remain free from falls  Outcome: Met This Shift     Problem: Falls - Risk of:  Goal: Absence of physical injury  Description: Absence of physical injury  Outcome: Met This Shift     Problem: Pain:  Goal: Pain level will decrease  Description: Pain level will decrease  Outcome: Met This Shift     Problem: OXYGENATION/RESPIRATORY FUNCTION  Goal: Patient will achieve/maintain normal respiratory rate/effort  Outcome: Met This Shift     Problem: MOBILITY  Goal: Early mobilization is achieved  Outcome: Met This Shift     Problem: SKIN INTEGRITY  Goal: Skin integrity is maintained or improved  Outcome: Met This Shift     Problem: Pain:  Goal: Control of acute pain  Description: Control of acute pain  Outcome: Ongoing     Problem: Pain:  Goal: Control of chronic pain  Description: Control of chronic pain  Outcome: Ongoing     Problem: ELIMINATION  Goal: Elimination patterns are normal or improving  Description: Elimination patterns return to pre-surgery normal patterns  Outcome: Ongoing

## 2020-09-18 NOTE — ANESTHESIA POSTPROCEDURE EVALUATION
Department of Anesthesiology  Postprocedure Note    Patient: Rachelle Zacarias  MRN: [id number]  YOB: 1940  Date of evaluation: 9/18/2020  Time:  8:41 AM     Procedure Summary     Date:  09/16/20 Room / Location:  [address]    Anesthesia Start:  1750 Anesthesia Stop:  9433    Procedure:  LAPAROSCOPIC CHOLECYSTECTOMY (N/A Abdomen) Diagnosis:  (-)    Surgeon:  Cr Shields MD Responsible Provider:  Johnny Harris MD    Anesthesia Type:  general ASA Status:  3          Anesthesia Type: general    Dwaine Phase I: Dwaine Score: 8    Dwaine Phase II: Dwaine Score: 10    Last vitals: Reviewed and per EMR flowsheets.        Anesthesia Post Evaluation    Patient location during evaluation: PACU  Patient participation: complete - patient participated  Level of consciousness: awake and alert  Airway patency: patent  Nausea & Vomiting: no vomiting and no nausea  Complications: no  Cardiovascular status: blood pressure returned to baseline  Respiratory status: acceptable  Hydration status: euvolemic

## 2020-09-22 NOTE — DISCHARGE SUMMARY
Physician Discharge Summary     Patient ID:  Ginette Cleary  [id number]  29 y.o.  1940    Admit date: 9/12/2020    Discharge date and time: 9/17/2020  1:59 PM     Admitting Physician: Cisco Sosa MD     Admission Diagnoses: Choledocholithiasis [K80.50]  Choledocholithiasis [K80.50]    Discharge Diagnoses: Principal Problem:    Gallstone pancreatitis  Active Problems:    Essential hypertension    Hypothyroidism    History of TIA (transient ischemic attack)    Nonischemic cardiomyopathy ([address]    Chronic combined systolic and diastolic HF (heart failure) (HCC)    Moderate obesity    Pure hypercholesterolemia    Cognitive dysfunction    Choledocholithiasis  Resolved Problems:    * No resolved hospital problems. *      Admission Condition: poor    Discharged Condition: stable    Indication for Admission: Gallstone Pancreatitis    Hospital Course/Procedures/Operation/treatments:   9/12: 78 y.o. female who came to the emergency department today with 2 days of nausea and epigastric pain. Denies fever or chills. Began antibiotics, CT showed multiple CBD stones. GI was consulted for ERCP and lap cholecystectomy will be planned after. 9/13: Pt feeling better today with no abdominal pain. LFTs improving. Continued antibiotics. GI plans for ERCP Tuesday. 9/14: Pt feeling much better, mild abdominal pain with nausea and vomiting. Some diarrhea, but no fevers or chills. Continue antibiotics, ERCP tomorrow, and lap alesha timing afterwards. 9/15: Pt without any abdominal pain. Continued antibiotics. ERCP delivered 4 large stones and stent was placed. Plan for lap cholecystectomy tomorrow   9/16: Pt has some abdominal pain following ERCP, but not too bad. Laparoscopic cholecystectomy today. 9/17: Pt without any pain. Continued rocephin/flagyl. Began low fat diet. Pt doing well and okay for discharge today.             Consults:   IP CONSULT TO GENERAL SURGERY  IP CONSULT TO HOSPITALIST  IP CONSULT TO GI  IP CONSULT TO SOCIAL WORK    Significant Diagnostic Studies:   Ct Abdomen Pelvis Wo Contrast Additional Contrast? None    Result Date: 2020  Patient MRN:  [id number] : 1940 Age: 78 years Gender: Female Order Date:  2020 1:11 PM EXAM: CT ABDOMEN PELVIS WO CONTRAST November of images 399. Technique: Low-dose CT  acquisition technique included one of following options; 1 . Automated exposure control, 2. Adjustment of MA and or KV according to patient's size or 3. Use of iterative reconstruction. INDICATION:  epigastric pain epigastric pain COMPARISON: 2009 FINDINGS: The lung bases demonstrate minimal atelectasis/infiltrates in the right middle lobe. The liver is fatty infiltrated. The gallbladder is distended with wall thickening and multiple gallstones. The common bile duct is dilated measuring 1.1 cm with  multiple impacted gallstones in the distal common bile duct. Some gallstones are seen in the duodenum. Spleen, pancreas, and adrenals are normal. The kidneys are small and lobulated and atrophic. 40% compression deformity of L1 is noted with the diffuse degenerative changes in the lumbar spine. Pelvis. The bladder is normal. There is diverticulosis of the colon without diverticulitis. The appendix is normal.     Distended gallbladder with wall thickening edema and multiple gallstones with dilated common bile duct with multiple gallstones in the common bile duct concerning for acute cholecystitis. Minimal atelectasis/infiltrates in the right lung base. Developing pneumonia is considered.        Discharge Exam:  GENERAL:  Laying in bed, awake, alert, cooperative, no apparent distress  HEAD: Normocephalic, atraumatic  EYES: No sclera icterus, pupils equal  LUNGS:  No increased work of breathing  CARDIOVASCULAR:  RR  ABDOMEN:  Soft, appropriate TTP around incisions, incisions c/d/i with minimal bruising around superior incision, non-distended  EXTREMITIES: No edema or swelling  SKIN: Warm and dry diarrhea. The diarrhea usually goes away in 2 to 4 weeks, but it may last longer. How quickly you recover depends on whether you had a laparoscopic or open surgery. · For a laparoscopic surgery, most people can go back to work or their normal routine in 1 to 2 weeks, but it may take longer, depending on the type of work you do. · For an open surgery, it will probably take 4 to 6 weeks before you get back to your normal routine. This care sheet gives you a general idea about how long it will take for you to recover. However, each person recovers at a different pace. Follow the steps below to get better as quickly as possible. How can you care for yourself at home? Activity  · Rest when you feel tired. Getting enough sleep will help you recover. · Try to walk each day. Start out by walking a little more than you did the day before. Gradually increase the amount you walk. Walking boosts blood flow and helps prevent pneumonia and constipation. · For about 2 to 4 weeks, avoid lifting anything that would make you strain. This may include a child, heavy grocery bags and milk containers, a heavy briefcase or backpack, cat litter or dog food bags, or a vacuum . · Avoid strenuous activities, such as biking, jogging, weightlifting, and aerobic exercise, until your doctor says it is okay. · You may shower 24 to 48 hours after surgery, if your doctor okays it. Pat the cut (incision) dry. Do not take a bath for the first 2 weeks, or until your doctor tells you it is okay. · You may drive when you are no longer taking pain medicine and can quickly move your foot from the gas pedal to the brake. You must also be able to sit comfortably for a long period of time, even if you do not plan to go far. You might get caught in traffic. · For a laparoscopic surgery, most people can go back to work or their normal routine in 1 to 2 weeks, but it may take longer.  For an open surgery, it will probably take 4 to 6 weeks before you get back to your normal routine. · Your doctor will tell you when you can have sex again. Diet  · Eat smaller meals more often instead of fewer larger meals. You can eat a normal diet, but avoid eating fatty foods for about 1 month. Fatty foods include hamburger, whole milk, cheese, and many snack foods. If your stomach is upset, try bland, low-fat foods like plain rice, broiled chicken, toast, and yogurt. · Drink plenty of fluids (unless your doctor tells you not to). · If you have diarrhea, try avoiding spicy foods, dairy products, fatty foods, and alcohol. You can also watch to see if specific foods cause it, and stop eating them. If the diarrhea continues for more than 2 weeks, talk to your doctor. · You may notice that your bowel movements are not regular right after your surgery. This is common. Try to avoid constipation and straining with bowel movements. You may want to take a fiber supplement every day. If you have not had a bowel movement after a couple of days, ask your doctor about taking a mild laxative. Medicines  · Your doctor will tell you if and when you can restart your medicines. He or she will also give you instructions about taking any new medicines. · If you take aspirin or some other blood thinner, ask your doctor if and when to start taking it again. Make sure that you understand exactly what your doctor wants you to do. · Take pain medicines exactly as directed. ? If the doctor gave you a prescription medicine for pain, take it as prescribed. ? If you are not taking a prescription pain medicine, take an over-the-counter medicine such as acetaminophen (Tylenol), ibuprofen (Advil, Motrin), or naproxen (Aleve). Read and follow all instructions on the label. ? Do not take two or more pain medicines at the same time unless the doctor told you to. Many pain medicines contain acetaminophen, which is Tylenol. Too much Tylenol can be harmful.   · If you think your pain medicine is making you sick to your stomach:  ? Take your medicine after meals (unless your doctor tells you not to). ? Ask your doctor for a different pain medicine. · If your doctor prescribed antibiotics, take them as directed. Do not stop taking them just because you feel better. You need to take the full course of antibiotics. Incision care  · If you have strips of tape on the incision, or cut, leave the tape on for a week or until it falls off. · After 24 to 48 hours, wash the area daily with warm, soapy water, and pat it dry. · You may have staples to hold the cut together. Keep them dry until your doctor takes them out. This is usually in 7 to 10 days. · Keep the area clean and dry. You may cover it with a gauze bandage if it weeps or rubs against clothing. Change the bandage every day. Ice  · To reduce swelling and pain, put ice or a cold pack on your belly for 10 to 20 minutes at a time. Do this every 1 to 2 hours. Put a thin cloth between the ice and your skin. Follow-up care is a key part of your treatment and safety. Be sure to make and go to all appointments, and call your doctor if you are having problems. It's also a good idea to know your test results and keep a list of the medicines you take. When should you call for help? [id number] anytime you think you may need emergency care. For example, call if:  · You passed out (lost consciousness). · You are short of breath. .  Call your doctor now or seek immediate medical care if:  · You are sick to your stomach and cannot drink fluids. · You have pain that does not get better when you take your pain medicine. · You cannot pass stools or gas. · You have signs of infection, such as:  ? Increased pain, swelling, warmth, or redness. ? Red streaks leading from the incision. ? Pus draining from the incision. ? A fever. · Bright red blood has soaked through the bandage over your incision. · You have loose stitches, or your incision comes open.   · You

## 2020-10-13 ENCOUNTER — HOSPITAL ENCOUNTER (OUTPATIENT)
Age: 80
Discharge: HOME | End: 2020-10-13
Payer: MEDICARE

## 2020-10-13 LAB
ALBUMIN SERPL-MCNC: 3.8 G/DL (ref 3.5–5.2)
ALP BLD-CCNC: 296 U/L (ref 35–104)
ALT SERPL-CCNC: 47 U/L (ref 0–32)
ANION GAP SERPL CALCULATED.3IONS-SCNC: 8 MMOL/L (ref 7–16)
AST SERPL-CCNC: 56 U/L (ref 0–31)
BASOPHILS ABSOLUTE: 0.07 E9/L (ref 0–0.2)
BASOPHILS RELATIVE PERCENT: 1 % (ref 0–2)
BILIRUB SERPL-MCNC: 0.6 MG/DL (ref 0–1.2)
BILIRUBIN DIRECT: 0.3 MG/DL (ref 0–0.3)
BILIRUBIN, INDIRECT: 0.3 MG/DL (ref 0–1)
BUN BLDV-MCNC: 13 MG/DL (ref 8–23)
CALCIUM SERPL-MCNC: 10.2 MG/DL (ref 8.6–10.2)
CHLORIDE BLD-SCNC: 101 MMOL/L (ref 98–107)
CO2: 29 MMOL/L (ref 22–29)
CREAT SERPL-MCNC: 1.3 MG/DL (ref 0.5–1)
EOSINOPHILS ABSOLUTE: 0.07 E9/L (ref 0.05–0.5)
EOSINOPHILS RELATIVE PERCENT: 1 % (ref 0–6)
GFR AFRICAN AMERICAN: 47
GFR NON-AFRICAN AMERICAN: 39 ML/MIN/1.73
GLUCOSE BLD-MCNC: 103 MG/DL (ref 74–99)
HCT VFR BLD CALC: 36.6 % (ref 34–48)
HEMOGLOBIN: 11.5 G/DL (ref 11.5–15.5)
IMMATURE GRANULOCYTES #: 0.02 E9/L
IMMATURE GRANULOCYTES %: 0.3 % (ref 0–5)
LIPASE: 27 U/L (ref 13–60)
LYMPHOCYTES ABSOLUTE: 1.7 E9/L (ref 1.5–4)
LYMPHOCYTES RELATIVE PERCENT: 23.4 % (ref 20–42)
MCH RBC QN AUTO: 26 PG (ref 26–35)
MCHC RBC AUTO-ENTMCNC: 31.4 % (ref 32–34.5)
MCV RBC AUTO: 82.6 FL (ref 80–99.9)
MONOCYTES ABSOLUTE: 0.49 E9/L (ref 0.1–0.95)
MONOCYTES RELATIVE PERCENT: 6.7 % (ref 2–12)
NEUTROPHILS ABSOLUTE: 4.92 E9/L (ref 1.8–7.3)
NEUTROPHILS RELATIVE PERCENT: 67.6 % (ref 43–80)
PDW BLD-RTO: 16.7 FL (ref 11.5–15)
PLATELET # BLD: 232 E9/L (ref 130–450)
PMV BLD AUTO: 9.7 FL (ref 7–12)
POTASSIUM SERPL-SCNC: 4.4 MMOL/L (ref 3.5–5)
RBC # BLD: 4.43 E12/L (ref 3.5–5.5)
SODIUM BLD-SCNC: 138 MMOL/L (ref 132–146)
TOTAL PROTEIN: 6.9 G/DL (ref 6.4–8.3)
WBC # BLD: 7.3 E9/L (ref 4.5–11.5)

## 2020-10-13 PROCEDURE — 85025 COMPLETE CBC W/AUTO DIFF WBC: CPT

## 2020-10-13 PROCEDURE — 83690 ASSAY OF LIPASE: CPT

## 2020-10-13 PROCEDURE — 36415 COLL VENOUS BLD VENIPUNCTURE: CPT

## 2020-10-13 PROCEDURE — 80076 HEPATIC FUNCTION PANEL: CPT

## 2020-10-13 PROCEDURE — 80048 BASIC METABOLIC PNL TOTAL CA: CPT

## 2020-10-16 ENCOUNTER — HOSPITAL ENCOUNTER (OUTPATIENT)
Age: 80
Discharge: HOME | End: 2020-10-18
Payer: MEDICARE

## 2020-10-16 PROCEDURE — U0003 INFECTIOUS AGENT DETECTION BY NUCLEIC ACID (DNA OR RNA); SEVERE ACUTE RESPIRATORY SYNDROME CORONAVIRUS 2 (SARS-COV-2) (CORONAVIRUS DISEASE [COVID-19]), AMPLIFIED PROBE TECHNIQUE, MAKING USE OF HIGH THROUGHPUT TECHNOLOGIES AS DESCRIBED BY CMS-2020-01-R: HCPCS

## 2020-10-16 RX ORDER — CONJUGATED ESTROGENS 0.62 MG/G
CREAM VAGINAL
COMMUNITY
Start: 2020-08-06

## 2020-10-16 RX ORDER — DONEPEZIL HYDROCHLORIDE 5 MG/1
TABLET, FILM COATED ORAL NIGHTLY
COMMUNITY
Start: 2020-07-21

## 2020-10-16 RX ORDER — SUCRALFATE 1 G/1
TABLET ORAL
COMMUNITY

## 2020-10-16 RX ORDER — BUSPIRONE HYDROCHLORIDE 15 MG/1
TABLET ORAL
COMMUNITY
Start: 2020-08-14

## 2020-10-16 RX ORDER — ONDANSETRON 4 MG/1
TABLET, FILM COATED ORAL
COMMUNITY
Start: 2020-09-11

## 2020-10-16 RX ORDER — BUPROPION HYDROCHLORIDE 150 MG/1
150 TABLET ORAL 2 TIMES DAILY
COMMUNITY
Start: 2018-01-04

## 2020-10-16 NOTE — PROGRESS NOTES
Have you been tested for COVID  Yes           Have you been told you were positive for COVID No  Have you had any known exposure to someone that is positive for COVID No  Do you have a cough                   No              Do you have shortness of breath No                 Do you have a sore throat            No                Are you having chills                    No                Are you having muscle aches. No                    Please come to the hospital wearing a mask and have your significant other wear a mask as well. Both of you should check your temperature before leaving to come here,  if it is 100 or higher please call [phone] for instruction.

## 2020-10-16 NOTE — PROGRESS NOTES
[facility] PRE-ADMISSION TESTING INSTRUCTIONS    The Preadmission Testing patient is instructed accordingly using the following criteria (check applicable):    ARRIVAL INSTRUCTIONS:  [x] Parking the day of Surgery is located in the Main Entrance lot. Upon entering the door, make an immediate right to the surgery reception desk    [] [phone] is available Monday through Friday 6 am to 6 pm    [x] Bring photo ID and insurance card    [] Bring in a copy of Living will or Durable Power of  papers. [x] Please be sure to arrange for responsible adult to provide transportation to and from the hospital    [x] Please arrange for responsible adult to be with you for the 24 hour period post procedure due to having anesthesia      GENERAL INSTRUCTIONS:    [x] Nothing by mouth after midnight, including gum, candy, mints or water    [x] You may brush your teeth, but do not swallow any water    [x] Take medications as instructed with 1-2 oz of water    [] Stop herbal supplements and vitamins 5 days prior to procedure    [] Follow preop dosing of blood thinners per physician instructions    [] Take 1/2 dose of evening insulin, but no insulin after midnight    [] No oral diabetic medications after midnight    [] If diabetic and have low blood sugar or feel symptomatic, take 1-2oz apple juice only    [] Bring inhalers day of surgery    [] Bring C-PAP/ Bi-Pap day of surgery    [] Bring urine specimen day of surgery    [x] Shower or bath with soap, lather and rinse well, AM of Surgery, no lotion, powders or creams to surgical site    [] Follow bowel prep as instructed per surgeon    [] No tobacco products within 24 hours of surgery     [x] No alcohol or illegal drug use within 24 hours of surgery.     [x] Jewelry, body piercing's, eyeglasses, contact lenses and dentures are not permitted into surgery (bring cases)      [x] Please do not wear any nail polish, make up or hair

## 2020-10-16 NOTE — PROGRESS NOTES
Spoke with patient's daughter and Nikolay Dawson, states patient is alert and oriented, signs own docs. She does not have any advanced directives.

## 2020-10-18 LAB
SARS-COV-2: NOT DETECTED
SOURCE: NORMAL

## 2020-10-20 ENCOUNTER — PREP FOR PROCEDURE (OUTPATIENT)
Dept: GASTROENTEROLOGY | Age: 80
End: 2020-10-20

## 2020-10-20 RX ORDER — SODIUM CHLORIDE 9 MG/ML
INJECTION, SOLUTION INTRAVENOUS CONTINUOUS
Status: CANCELLED | OUTPATIENT
Start: 2020-10-20

## 2020-10-20 RX ORDER — SODIUM CHLORIDE 0.9 % (FLUSH) 0.9 %
10 SYRINGE (ML) INJECTION EVERY 12 HOURS SCHEDULED
Status: CANCELLED | OUTPATIENT
Start: 2020-10-20

## 2020-10-20 RX ORDER — SODIUM CHLORIDE 0.9 % (FLUSH) 0.9 %
10 SYRINGE (ML) INJECTION PRN
Status: CANCELLED | OUTPATIENT
Start: 2020-10-20

## 2020-10-21 ENCOUNTER — ANESTHESIA EVENT (OUTPATIENT)
Dept: ENDOSCOPY | Age: 80
End: 2020-10-21
Payer: MEDICARE

## 2020-10-21 ENCOUNTER — APPOINTMENT (OUTPATIENT)
Dept: GENERAL RADIOLOGY | Age: 80
End: 2020-10-21
Attending: INTERNAL MEDICINE
Payer: MEDICARE

## 2020-10-21 ENCOUNTER — ANESTHESIA (OUTPATIENT)
Dept: ENDOSCOPY | Age: 80
End: 2020-10-21
Payer: MEDICARE

## 2020-10-21 ENCOUNTER — HOSPITAL ENCOUNTER (OUTPATIENT)
Age: 80
Setting detail: OUTPATIENT SURGERY
Discharge: HOME | End: 2020-10-21
Attending: INTERNAL MEDICINE | Admitting: INTERNAL MEDICINE
Payer: MEDICARE

## 2020-10-21 VITALS — SYSTOLIC BLOOD PRESSURE: 134 MMHG | OXYGEN SATURATION: 95 % | DIASTOLIC BLOOD PRESSURE: 58 MMHG

## 2020-10-21 VITALS
OXYGEN SATURATION: 94 % | RESPIRATION RATE: 18 BRPM | TEMPERATURE: 97.7 F | SYSTOLIC BLOOD PRESSURE: 127 MMHG | BODY MASS INDEX: 28.99 KG/M2 | HEART RATE: 59 BPM | HEIGHT: 65 IN | DIASTOLIC BLOOD PRESSURE: 58 MMHG | WEIGHT: 174 LBS

## 2020-10-21 PROCEDURE — 2580000003 HC RX 258: Performed by: NURSE ANESTHETIST, CERTIFIED REGISTERED

## 2020-10-21 PROCEDURE — 7100000011 HC PHASE II RECOVERY - ADDTL 15 MIN: Performed by: INTERNAL MEDICINE

## 2020-10-21 PROCEDURE — 6360000002 HC RX W HCPCS: Performed by: NURSE ANESTHETIST, CERTIFIED REGISTERED

## 2020-10-21 PROCEDURE — 74330 X-RAY BILE/PANC ENDOSCOPY: CPT

## 2020-10-21 PROCEDURE — 6360000004 HC RX CONTRAST MEDICATION: Performed by: INTERNAL MEDICINE

## 2020-10-21 PROCEDURE — C1769 GUIDE WIRE: HCPCS | Performed by: INTERNAL MEDICINE

## 2020-10-21 PROCEDURE — 3609015000 HC ERCP REMOVE FOREIGN BODY/STENT BILIARY/PANC DUCT: Performed by: INTERNAL MEDICINE

## 2020-10-21 PROCEDURE — 3700000001 HC ADD 15 MINUTES (ANESTHESIA): Performed by: INTERNAL MEDICINE

## 2020-10-21 PROCEDURE — 3700000000 HC ANESTHESIA ATTENDED CARE: Performed by: INTERNAL MEDICINE

## 2020-10-21 PROCEDURE — 2500000003 HC RX 250 WO HCPCS: Performed by: NURSE ANESTHETIST, CERTIFIED REGISTERED

## 2020-10-21 PROCEDURE — 2720000010 HC SURG SUPPLY STERILE: Performed by: INTERNAL MEDICINE

## 2020-10-21 PROCEDURE — 7100000010 HC PHASE II RECOVERY - FIRST 15 MIN: Performed by: INTERNAL MEDICINE

## 2020-10-21 PROCEDURE — 2709999900 HC NON-CHARGEABLE SUPPLY: Performed by: INTERNAL MEDICINE

## 2020-10-21 RX ORDER — SODIUM CHLORIDE 0.9 % (FLUSH) 0.9 %
10 SYRINGE (ML) INJECTION PRN
Status: DISCONTINUED | OUTPATIENT
Start: 2020-10-21 | End: 2020-10-21 | Stop reason: HOSPADM

## 2020-10-21 RX ORDER — SODIUM CHLORIDE 0.9 % (FLUSH) 0.9 %
10 SYRINGE (ML) INJECTION EVERY 12 HOURS SCHEDULED
Status: DISCONTINUED | OUTPATIENT
Start: 2020-10-21 | End: 2020-10-21 | Stop reason: HOSPADM

## 2020-10-21 RX ORDER — SODIUM CHLORIDE 9 MG/ML
INJECTION, SOLUTION INTRAVENOUS CONTINUOUS PRN
Status: DISCONTINUED | OUTPATIENT
Start: 2020-10-21 | End: 2020-10-21 | Stop reason: SDUPTHER

## 2020-10-21 RX ORDER — PROPOFOL 10 MG/ML
INJECTION, EMULSION INTRAVENOUS PRN
Status: DISCONTINUED | OUTPATIENT
Start: 2020-10-21 | End: 2020-10-21 | Stop reason: SDUPTHER

## 2020-10-21 RX ORDER — SODIUM CHLORIDE 9 MG/ML
INJECTION, SOLUTION INTRAVENOUS CONTINUOUS
Status: DISCONTINUED | OUTPATIENT
Start: 2020-10-21 | End: 2020-10-21 | Stop reason: HOSPADM

## 2020-10-21 RX ADMIN — IOPAMIDOL 16 ML: 612 INJECTION, SOLUTION INTRAVENOUS at 14:01

## 2020-10-21 RX ADMIN — SODIUM CHLORIDE: 9 INJECTION, SOLUTION INTRAVENOUS at 13:09

## 2020-10-21 RX ADMIN — GLUCAGON HYDROCHLORIDE 0.25 MG: KIT at 13:51

## 2020-10-21 RX ADMIN — PROPOFOL 200 MG: 10 INJECTION, EMULSION INTRAVENOUS at 13:42

## 2020-10-21 ASSESSMENT — PAIN - FUNCTIONAL ASSESSMENT: PAIN_FUNCTIONAL_ASSESSMENT: 0-10

## 2020-10-21 NOTE — H&P
Gastroenterology      Pre-operative History and Physical      HISTORY OF PRESENT ILLNESS:   Humberto Cordon is a 78 y.o. female with significant past medical history of thyroid disease, HTN, TIA admitted via ED for abdominal pain. Pt reports to upper abdominal pain described as \"terrible thought I was going to die\" rated at a 13/10. States the pain was radiating around into her back. Took 2 Ibuprofen, 2 Tylenol, and 2 Ultram for pain relief. States \"the Ultram did nothing and the others helped mildly. Eating worsened the pain. Had Waffles & scrambled eggs that morning prior to onset of the discomfort. Nauseated with vomiting. Describes the emesis as \"food intially, then was just all liquid\". Denies any hematemesis. Fevers 103 at home orally. Chilling to the point that \"my teeth were chattering\". Denies any recent sick contacts. Constipation is normal for the patient. Every other day formed brown stools. Last BM on Thursday described as \"formed hard beige stool- the color was weird\". Denies any melena or hematochezia. Ongoing poor appetite with her symptoms. 36# weight loss over the last 2 years intentional. She attends weight watchers. Had a EGD and colon \"years ago\", showed \"irritation & ulcers in my stomach. My colon was clean enough was supposed to go back but I neve did\". Denies any [address] of colon cancer. Admission labs creat 1.4, protein 5.6, albumin 2.9, , , AST 88, bili 4.0, direct 3.6, lipase >3000, WBC 17.8, H&H 9.9 & 31.6, MCH 25.8, MCHC 31.3, RDW 16.2, neutro 81.7%, lymph 8.1%, abso neutro 14.54, abso lymph 1.44, abso mono 1.48. CT ABD: Distended gallbladder with wall thickening edema and multiple gallstones with dilated common bile duct with multiple gallstones in the common bile duct concerning for acute cholecystitis. Minimal atelectasis/infiltrates in the right lung base. Developing pneumonia is considered. HERE FOR ERCP WITH STENT REMOVAL.     HISTORY:   Past Medical History: Diagnosis Date    Anxiety     Cardiomyopathy [facility])     CHF (congestive heart failure) ([facility] [address]     follows with Dr Cindi Post yearly, patient denies any cardiac symptoms presently.  Cognitive dysfunction     Depression     History of TIA (transient ischemic attack)     TIAs ~2002 noted on brain imaging done because pt was having imbalance    Hyperlipidemia     Hypertension     Thyroid disease        PERTINENT FAMILY HISTORY:  History reviewed. No pertinent family history.     MEDICATIONS:    Current Outpatient Medications:     buPROPion (WELLBUTRIN XL) 150 MG extended release tablet, Take 150 mg by mouth 2 times daily Instructed to take with sip water am of procedure, Disp: , Rfl:     busPIRone (BUSPAR) 15 MG tablet, Instructed to take with sip water am of procedure, Disp: , Rfl:     donepezil (ARICEPT) 5 MG tablet, nightly , Disp: , Rfl:     PREMARIN 0.625 MG/GM vaginal cream, APPLY 0.625MG VAGINALLY THREE TIMES A WEEK, Disp: , Rfl:     ondansetron (ZOFRAN) 4 MG tablet, TK 1 T PO TID, Disp: , Rfl:     sucralfate (CARAFATE) 1 GM tablet, Take 1 tablet 4 times a day by oral route., Disp: , Rfl:     metoprolol succinate (TOPROL XL) 25 MG extended release tablet, TAKE 1 TABLET BY MOUTH  DAILY (Patient taking differently: Take 25 mg by mouth daily Instructed to take with sip water am of procedure), Disp: 90 tablet, Rfl: 3    furosemide (LASIX) 40 MG tablet, Take 40 mg by mouth daily as needed , Disp: , Rfl:     Cholecalciferol (VITAMIN D) 2000 units CAPS capsule, Take by mouth daily, Disp: , Rfl:     topiramate (TOPAMAX) 25 MG tablet, Take 25 mg by mouth daily, Disp: , Rfl:     atorvastatin (LIPITOR) 40 MG tablet, TAKE 1 TABLET BY MOUTH  NIGHTLY, Disp: 90 tablet, Rfl: 3    Cyanocobalamin (B-12 PO), Take by mouth daily , Disp: , Rfl:     CALCIUM-MAGNESIUM-ZINC PO, Take by mouth daily, Disp: , Rfl:     Fluticasone Propionate (FLONASE NA), by Nasal route, Disp: , Rfl:     levothyroxine (SYNTHROID) 75 MCG 10/21/2020 at 3:41 PM

## 2020-10-21 NOTE — ANESTHESIA POSTPROCEDURE EVALUATION
Department of Anesthesiology  Postprocedure Note    Patient: Matilda Hummel  MRN: [id number]  YOB: 1940  Date of evaluation: 10/21/2020  Time:  3:12 PM     Procedure Summary     Date:  10/21/20 Room / Location:  [address]    Anesthesia Start:  1694 Anesthesia Stop:  2108    Procedures:       ERCP STENT REMOVAL   +++IODINE ALLERGY+++ (N/A )      ERCP STONE REMOVAL Diagnosis:  (CHOLEDOCHOLITHIASIS)    Surgeon:  Brenden Justice MD Responsible Provider:  Rebeka Monzon MD    Anesthesia Type:  MAC ASA Status:  3          Anesthesia Type: MAC    Dwaine Phase I: Dwaine Score: 10    Dwaine Phase II: Dwaine Score: 10    Last vitals: Reviewed and per EMR flowsheets.        Anesthesia Post Evaluation    Patient location during evaluation: PACU  Patient participation: complete - patient participated  Level of consciousness: awake  Pain score: 3  Airway patency: patent  Nausea & Vomiting: no nausea  Complications: no  Cardiovascular status: blood pressure returned to baseline  Respiratory status: acceptable  Hydration status: euvolemic

## 2020-10-21 NOTE — ANESTHESIA PRE PROCEDURE
Department of Anesthesiology  Preprocedure Note       Name:  Zakiya Hairston   Age:  78 y.o.  :  1940                                          MRN:  [id number]         Date:  10/21/2020      Surgeon: Karli Dillard):  Antonio Soriano MD    Procedure: Procedure(s):  ERCP STENT REMOVAL   +++IODINE ALLERGY+++    Medications prior to admission:   Prior to Admission medications    Medication Sig Start Date End Date Taking? Authorizing Provider   buPROPion (WELLBUTRIN XL) 150 MG extended release tablet Take 150 mg by mouth 2 times daily Instructed to take with sip water am of procedure 18  Yes Historical Provider, MD   busPIRone (BUSPAR) 15 MG tablet Instructed to take with sip water am of procedure 20  Yes Historical Provider, MD   donepezil (ARICEPT) 5 MG tablet nightly  20  Yes Historical Provider, MD   PREMARIN 0.625 MG/GM vaginal cream APPLY 0.625MG VAGINALLY THREE TIMES A WEEK 20  Yes Historical Provider, MD   ondansetron (ZOFRAN) 4 MG tablet TK 1 T PO TID 20  Yes Historical Provider, MD   sucralfate (CARAFATE) 1 GM tablet Take 1 tablet 4 times a day by oral route.    Yes Historical Provider, MD   metoprolol succinate (TOPROL XL) 25 MG extended release tablet TAKE 1 TABLET BY MOUTH  DAILY  Patient taking differently: Take 25 mg by mouth daily Instructed to take with sip water am of procedure 20  Yes Kierra Gardiner MD   furosemide (LASIX) 40 MG tablet Take 40 mg by mouth daily as needed    Yes Historical Provider, MD   Cholecalciferol (VITAMIN D) 2000 units CAPS capsule Take by mouth daily   Yes Historical Provider, MD   topiramate (TOPAMAX) 25 MG tablet Take 25 mg by mouth daily   Yes Historical Provider, MD   atorvastatin (LIPITOR) 40 MG tablet TAKE 1 TABLET BY MOUTH  NIGHTLY 10/14/19  Yes Ana Maria Philip MD   Cyanocobalamin (B-12 PO) Take by mouth daily    Yes Historical Provider, MD   CALCIUM-MAGNESIUM-ZINC PO Take by mouth daily   Yes Historical Provider, MD   Fluticasone Propionate (FLONASE NA) by Nasal route   Yes Historical Provider, MD   levothyroxine (SYNTHROID) 75 MCG tablet Take 75 mcg by mouth daily  8/4/17  Yes Historical Provider, MD   pantoprazole (PROTONIX) 40 MG tablet Take 40 mg by mouth daily Instructed to take with sip water am of procedure 9/6/17  Yes Historical Provider, MD   losartan (COZAAR) 50 MG tablet Take 1 tablet by mouth daily 9/19/17  Yes Vinod Godfrey,    aspirin 81 MG tablet Take 81 mg by mouth daily Hold 4 days pre-procedure/Dr Torsten Menendez   Yes Historical Provider, MD   calcium-vitamin D (OSCAL) 250-125 MG-UNIT per tablet Take 1 tablet by mouth daily   Yes Historical Provider, MD   traMADol (ULTRAM) 50 MG tablet Take 100 mg by mouth 3 times daily. Instructed to take with sip water am of procedure, if needed. Yes Historical Provider, MD   Potassium (POTASSIMIN PO) Take 20 mEq by mouth daily    Historical Provider, MD   Cetirizine HCl (ZYRTEC PO) Take by mouth daily    Historical Provider, MD       Current medications:    Current Facility-Administered Medications   Medication Dose Route Frequency Provider Last Rate Last Dose    0.9 % sodium chloride infusion   Intravenous Continuous John Velasco MD        sodium chloride flush 0.9 % injection 10 mL  10 mL Intravenous 2 times per day John Velasco MD        sodium chloride flush 0.9 % injection 10 mL  10 mL Intravenous PRN John Velasco MD           Allergies:     Allergies   Allergen Reactions    Macrobid [Nitrofurantoin Monohyd Macro] Nausea Only and Rash    Macrobid [Nitrofurantoin] Anaphylaxis    Ciprofloxacin     Iodine      Only when injected    Penicillins        Problem List:    Patient Active Problem List   Diagnosis Code    Gallstone pancreatitis K85.10    Essential hypertension I10    Hypothyroidism E03.9    History of TIA (transient ischemic attack) Z86.73    Nonischemic cardiomyopathy (HCC) I42.8    Chronic combined systolic and diastolic HF (heart failure) (HCC) I50.42    Moderate obesity E66.8    Pure hypercholesterolemia E78.00    Cognitive dysfunction F09    Choledocholithiasis K80.50       Past Medical History:        Diagnosis Date    Anxiety     Cardiomyopathy ([facility]     CHF (congestive heart failure) ([facility]     follows with Dr Melecio Hamlin yearly, patient denies any cardiac symptoms presently.     Cognitive dysfunction     Depression     History of TIA (transient ischemic attack)     TIAs ~ noted on brain imaging done because pt was having imbalance    Hyperlipidemia     Hypertension     Thyroid disease        Past Surgical History:        Procedure Laterality Date    CHOLECYSTECTOMY, LAPAROSCOPIC N/A 2020    LAPAROSCOPIC CHOLECYSTECTOMY performed by Jai Saleh MD at [facility] ERCP N/A 9/15/2020    ERCP STENT INSERTION with BALLOON SWEEPING and PAPILLOTOMY performed by Berto Strong MD at [address] [place] [address]         Social History:    Social History     Tobacco Use    Smoking status: Former Smoker     Packs/day: 1.00     Years: 40.00     Pack years: 40.00     Types: Cigarettes     Last attempt to quit:      Years since quittin.8    Smokeless tobacco: Never Used   Substance Use Topics    Alcohol use: Yes     Comment: rare mixed drink;  drinks 1 cup of coffee and pop qd                                Counseling given: Not Answered      Vital Signs (Current):   Vitals:    10/16/20 0937 10/21/20 1155   BP:  (!) 140/65   Pulse:  61   Resp:  16   Temp:  97.7 °F (36.5 °C)   SpO2:  95%   Weight: 174 lb (78.9 kg) 174 lb (78.9 kg)   Height: 5' 5\" (1.651 m) 5' 5\" (1.651 m)                                              BP Readings from Last 3 Encounters:   10/21/20 (!) 140/65   20 (!) 128/59   20 139/69       NPO Status: Time of last liquid consumption:                         Time of last solid consumption:                         Date of last liquid consumption: 10/20/20                        Date of last solid food consumption: 10/20/20    BMI:   Wt Readings from Last 3 Encounters:   10/21/20 174 lb (78.9 kg)   09/15/20 181 lb 12.8 oz (82.5 kg)   10/22/19 185 lb 3.2 oz (84 kg)     Body mass index is 28.96 kg/m². CBC:   Lab Results   Component Value Date    WBC 7.3 10/13/2020    RBC 4.43 10/13/2020    HGB 11.5 10/13/2020    HCT 36.6 10/13/2020    MCV 82.6 10/13/2020    RDW 16.7 10/13/2020     10/13/2020       CMP:   Lab Results   Component Value Date     10/13/2020    K 4.4 10/13/2020    K 4.1 09/17/2020     10/13/2020    CO2 29 10/13/2020    BUN 13 10/13/2020    CREATININE 1.3 10/13/2020    GFRAA 47 10/13/2020    LABGLOM 39 10/13/2020    GLUCOSE 103 10/13/2020    PROT 6.9 10/13/2020    CALCIUM 10.2 10/13/2020    BILITOT 0.6 10/13/2020    ALKPHOS 296 10/13/2020    AST 56 10/13/2020    ALT 47 10/13/2020       POC Tests: No results for input(s): POCGLU, POCNA, POCK, POCCL, POCBUN, POCHEMO, POCHCT in the last 72 hours.     Coags:   Lab Results   Component Value Date    PROTIME 13.5 09/15/2020    INR 1.2 09/15/2020    APTT 31.5 09/15/2020       HCG (If Applicable): No results found for: PREGTESTUR, PREGSERUM, HCG, HCGQUANT     ABGs: No results found for: PHART, PO2ART, VEV6NXU, QEP6NMK, BEART, D3YEQORK     Type & Screen (If Applicable):  No results found for: LABABO, LABRH    Drug/Infectious Status (If Applicable):  No results found for: HIV, HEPCAB    COVID-19 Screening (If Applicable):   Lab Results   Component Value Date    COVID19 Not Detected 10/16/2020         Anesthesia Evaluation  Patient summary reviewed  Airway: Mallampati: II  TM distance: >3 FB   Neck ROM: full  Mouth opening: > = 3 FB Dental: normal exam         Pulmonary:Negative Pulmonary ROS breath sounds clear to auscultation                             Cardiovascular:    (+) hypertension:, CHF:,         Rhythm: regular  Rate: normal           Beta Blocker:  Dose within 24 Hrs Neuro/Psych:   (+) psychiatric history:depression/anxiety             GI/Hepatic/Renal: Neg GI/Hepatic/Renal ROS            Endo/Other:    (+) hypothyroidism::., .                 Abdominal:       Abdomen: soft. Vascular:                                        Anesthesia Plan      MAC     ASA 3       Induction: intravenous. Anesthetic plan and risks discussed with patient. Plan discussed with CRNA.                   Arthur Smith MD   10/21/2020

## 2020-10-22 NOTE — OP NOTE
[id number] [address]                                OPERATIVE REPORT    PATIENT NAME: Dariusz Bautista                 :        1940  MED REC NO:   [id number]                            ROOM:  ACCOUNT NO:   [de-identified]                           ADMIT DATE: 10/21/2020  PROVIDER:     Song Whitehead MD    DATE OF PROCEDURE:  10/21/2020    PROCEDURES PERFORMED:  ERCP with stent removal and stones extraction. PREOPERATIVE DIAGNOSES:  The patient is status post ERCP with  papillotomy done inside a large duodenal diverticulum. The patient had  some retained common bile duct stones. Stent was placed, and she is  here now for stent removal and stones extraction. POSTOPERATIVE DIAGNOSES:  Old stent was removed, and four calcified  stones were removed via balloon sweeping. Excellent drainage obtained. Balloon cholangiography was unremarkable. ANESTHESIA:  LMAC. DESCRIPTION OF PROCEDURE:  With the patient in her left lateral  decubitus position, the Olympus side-viewing video duodenoscope was  introduced into the esophagus, advanced through the GE junction into the  gastric body, advanced through the pylorus into duodenal bulb, second  portion of duodenum where papilla was visualized and positioned at 12  o'clock position. Stent was seen inside the duodenal diverticulum and  was extracted using rat tooth forceps. The common bile duct was then  re-cannulated using a papillotome, deeply cannulated with a guidewire  over which the papillotome was exchanged with 9 to 12 Maori balloon,  and balloon sweeping resulted in removal of four stones. The balloon  was then insufflated, and balloon cholangiography showed it to be within  normal limits. Again, few sweepings were done. Excellent drainage was  obtained. The scope was retrieved, area decompressed, and the procedure  was terminated.   The patient tolerated the procedure well.         Torey Watson MD    D: 10/21/2020 15:43:52       T: 10/21/2020 15:50:26     [email or web address]  Job#: [id number]     Doc#: [id number]    CC:  Kavin Butler, MD Sande Closs, MD

## 2022-01-21 ENCOUNTER — APPOINTMENT (OUTPATIENT)
Dept: CT IMAGING | Age: 82
DRG: 871 | End: 2022-01-21
Payer: MEDICARE

## 2022-01-21 ENCOUNTER — APPOINTMENT (OUTPATIENT)
Dept: ULTRASOUND IMAGING | Age: 82
DRG: 871 | End: 2022-01-21
Payer: MEDICARE

## 2022-01-21 ENCOUNTER — HOSPITAL ENCOUNTER (INPATIENT)
Age: 82
LOS: 6 days | Discharge: NURSING, REHAB OR LONG-TERM CARE | DRG: 871 | End: 2022-01-27
Attending: EMERGENCY MEDICINE | Admitting: STUDENT IN AN ORGANIZED HEALTH CARE EDUCATION/TRAINING PROGRAM
Payer: MEDICARE

## 2022-01-21 ENCOUNTER — APPOINTMENT (OUTPATIENT)
Dept: GENERAL RADIOLOGY | Age: 82
DRG: 871 | End: 2022-01-21
Payer: MEDICARE

## 2022-01-21 DIAGNOSIS — J96.01 ACUTE RESPIRATORY FAILURE WITH HYPOXEMIA (HCC): ICD-10-CM

## 2022-01-21 DIAGNOSIS — N39.0 SEPSIS SECONDARY TO UTI (HCC): Primary | ICD-10-CM

## 2022-01-21 DIAGNOSIS — A41.9 SEPSIS SECONDARY TO UTI (HCC): Primary | ICD-10-CM

## 2022-01-21 PROBLEM — K85.10 GALLSTONE PANCREATITIS: Status: RESOLVED | Noted: 2017-09-17 | Resolved: 2022-01-21

## 2022-01-21 PROBLEM — R74.01 ELEVATED TRANSAMINASE LEVEL: Status: ACTIVE | Noted: 2022-01-21

## 2022-01-21 PROBLEM — N18.30 CKD (CHRONIC KIDNEY DISEASE) STAGE 3, GFR 30-59 ML/MIN (HCC): Status: ACTIVE | Noted: 2022-01-21

## 2022-01-21 PROBLEM — K80.50 CHOLEDOCHOLITHIASIS: Status: RESOLVED | Noted: 2020-09-12 | Resolved: 2022-01-21

## 2022-01-21 LAB
ALBUMIN SERPL-MCNC: 3.2 G/DL (ref 3.5–5.2)
ALP BLD-CCNC: 224 U/L (ref 35–104)
ALT SERPL-CCNC: 53 U/L (ref 0–32)
ANION GAP SERPL CALCULATED.3IONS-SCNC: 11 MMOL/L (ref 7–16)
ANISOCYTOSIS: ABNORMAL
AST SERPL-CCNC: 57 U/L (ref 0–31)
BACTERIA: ABNORMAL /HPF
BASOPHILS ABSOLUTE: 0.01 E9/L (ref 0–0.2)
BASOPHILS RELATIVE PERCENT: 0.1 % (ref 0–2)
BILIRUB SERPL-MCNC: 0.8 MG/DL (ref 0–1.2)
BILIRUBIN DIRECT: 0.4 MG/DL (ref 0–0.3)
BILIRUBIN URINE: NEGATIVE
BILIRUBIN, INDIRECT: 0.4 MG/DL (ref 0–1)
BLOOD, URINE: ABNORMAL
BUN BLDV-MCNC: 22 MG/DL (ref 6–23)
C-REACTIVE PROTEIN: 21.7 MG/DL (ref 0–0.4)
CALCIUM SERPL-MCNC: 9.3 MG/DL (ref 8.6–10.2)
CHLORIDE BLD-SCNC: 95 MMOL/L (ref 98–107)
CLARITY: CLEAR
CO2: 22 MMOL/L (ref 22–29)
COLOR: YELLOW
CREAT SERPL-MCNC: 1.4 MG/DL (ref 0.5–1)
D DIMER: 798 NG/ML DDU
EKG ATRIAL RATE: 79 BPM
EKG P AXIS: 58 DEGREES
EKG P-R INTERVAL: 168 MS
EKG Q-T INTERVAL: 398 MS
EKG QRS DURATION: 88 MS
EKG QTC CALCULATION (BAZETT): 456 MS
EKG R AXIS: -6 DEGREES
EKG T AXIS: 45 DEGREES
EKG VENTRICULAR RATE: 79 BPM
EOSINOPHILS ABSOLUTE: 0.02 E9/L (ref 0.05–0.5)
EOSINOPHILS RELATIVE PERCENT: 0.1 % (ref 0–6)
EPITHELIAL CELLS, UA: ABNORMAL /HPF
FERRITIN: 85 NG/ML
GFR AFRICAN AMERICAN: 43
GFR NON-AFRICAN AMERICAN: 36 ML/MIN/1.73
GLUCOSE BLD-MCNC: 115 MG/DL (ref 74–99)
GLUCOSE URINE: NEGATIVE MG/DL
HCT VFR BLD CALC: 32.7 % (ref 34–48)
HEMOGLOBIN: 10.3 G/DL (ref 11.5–15.5)
IMMATURE GRANULOCYTES #: 0.31 E9/L
IMMATURE GRANULOCYTES %: 2.2 % (ref 0–5)
KETONES, URINE: NEGATIVE MG/DL
LACTATE DEHYDROGENASE: 234 U/L (ref 135–214)
LACTIC ACID: 1.1 MMOL/L (ref 0.5–2.2)
LEUKOCYTE ESTERASE, URINE: ABNORMAL
LIPASE: 10 U/L (ref 13–60)
LYMPHOCYTES ABSOLUTE: 0.44 E9/L (ref 1.5–4)
LYMPHOCYTES RELATIVE PERCENT: 3.2 % (ref 20–42)
MAGNESIUM: 1.4 MG/DL (ref 1.6–2.6)
MCH RBC QN AUTO: 24.6 PG (ref 26–35)
MCHC RBC AUTO-ENTMCNC: 31.5 % (ref 32–34.5)
MCV RBC AUTO: 78 FL (ref 80–99.9)
MONOCYTES ABSOLUTE: 1.02 E9/L (ref 0.1–0.95)
MONOCYTES RELATIVE PERCENT: 7.4 % (ref 2–12)
NEUTROPHILS ABSOLUTE: 12.01 E9/L (ref 1.8–7.3)
NEUTROPHILS RELATIVE PERCENT: 87 % (ref 43–80)
NITRITE, URINE: POSITIVE
OVALOCYTES: ABNORMAL
PDW BLD-RTO: 19.2 FL (ref 11.5–15)
PH UA: 6 (ref 5–9)
PLATELET # BLD: 148 E9/L (ref 130–450)
PMV BLD AUTO: 10.6 FL (ref 7–12)
POIKILOCYTES: ABNORMAL
POLYCHROMASIA: ABNORMAL
POTASSIUM SERPL-SCNC: 4.1 MMOL/L (ref 3.5–5)
PROCALCITONIN: 1.4 NG/ML (ref 0–0.08)
PROTEIN UA: 30 MG/DL
RBC # BLD: 4.19 E12/L (ref 3.5–5.5)
RBC UA: ABNORMAL /HPF (ref 0–2)
SARS-COV-2, NAAT: NOT DETECTED
SODIUM BLD-SCNC: 128 MMOL/L (ref 132–146)
SPECIFIC GRAVITY UA: 1.02 (ref 1–1.03)
TOTAL PROTEIN: 5.9 G/DL (ref 6.4–8.3)
TROPONIN, HIGH SENSITIVITY: 27 NG/L (ref 0–9)
UROBILINOGEN, URINE: 2 E.U./DL
WBC # BLD: 13.8 E9/L (ref 4.5–11.5)
WBC UA: ABNORMAL /HPF (ref 0–5)

## 2022-01-21 PROCEDURE — 76705 ECHO EXAM OF ABDOMEN: CPT

## 2022-01-21 PROCEDURE — 86140 C-REACTIVE PROTEIN: CPT

## 2022-01-21 PROCEDURE — 93010 ELECTROCARDIOGRAM REPORT: CPT | Performed by: INTERNAL MEDICINE

## 2022-01-21 PROCEDURE — 83690 ASSAY OF LIPASE: CPT

## 2022-01-21 PROCEDURE — 87040 BLOOD CULTURE FOR BACTERIA: CPT

## 2022-01-21 PROCEDURE — 6370000000 HC RX 637 (ALT 250 FOR IP): Performed by: INTERNAL MEDICINE

## 2022-01-21 PROCEDURE — 93005 ELECTROCARDIOGRAM TRACING: CPT | Performed by: EMERGENCY MEDICINE

## 2022-01-21 PROCEDURE — 87186 SC STD MICRODIL/AGAR DIL: CPT

## 2022-01-21 PROCEDURE — 2580000003 HC RX 258: Performed by: EMERGENCY MEDICINE

## 2022-01-21 PROCEDURE — 87635 SARS-COV-2 COVID-19 AMP PRB: CPT

## 2022-01-21 PROCEDURE — 87077 CULTURE AEROBIC IDENTIFY: CPT

## 2022-01-21 PROCEDURE — 84484 ASSAY OF TROPONIN QUANT: CPT

## 2022-01-21 PROCEDURE — 71275 CT ANGIOGRAPHY CHEST: CPT

## 2022-01-21 PROCEDURE — 85378 FIBRIN DEGRADE SEMIQUANT: CPT

## 2022-01-21 PROCEDURE — 82728 ASSAY OF FERRITIN: CPT

## 2022-01-21 PROCEDURE — 81001 URINALYSIS AUTO W/SCOPE: CPT

## 2022-01-21 PROCEDURE — 83615 LACTATE (LD) (LDH) ENZYME: CPT

## 2022-01-21 PROCEDURE — 96374 THER/PROPH/DIAG INJ IV PUSH: CPT

## 2022-01-21 PROCEDURE — 2580000003 HC RX 258: Performed by: INTERNAL MEDICINE

## 2022-01-21 PROCEDURE — 80076 HEPATIC FUNCTION PANEL: CPT

## 2022-01-21 PROCEDURE — 6360000004 HC RX CONTRAST MEDICATION: Performed by: RADIOLOGY

## 2022-01-21 PROCEDURE — 6370000000 HC RX 637 (ALT 250 FOR IP): Performed by: EMERGENCY MEDICINE

## 2022-01-21 PROCEDURE — 83605 ASSAY OF LACTIC ACID: CPT

## 2022-01-21 PROCEDURE — 71045 X-RAY EXAM CHEST 1 VIEW: CPT

## 2022-01-21 PROCEDURE — 6360000002 HC RX W HCPCS: Performed by: INTERNAL MEDICINE

## 2022-01-21 PROCEDURE — 80048 BASIC METABOLIC PNL TOTAL CA: CPT

## 2022-01-21 PROCEDURE — 83735 ASSAY OF MAGNESIUM: CPT

## 2022-01-21 PROCEDURE — 85025 COMPLETE CBC W/AUTO DIFF WBC: CPT

## 2022-01-21 PROCEDURE — 84145 PROCALCITONIN (PCT): CPT

## 2022-01-21 PROCEDURE — 6360000002 HC RX W HCPCS: Performed by: EMERGENCY MEDICINE

## 2022-01-21 PROCEDURE — 87150 DNA/RNA AMPLIFIED PROBE: CPT

## 2022-01-21 PROCEDURE — 99285 EMERGENCY DEPT VISIT HI MDM: CPT

## 2022-01-21 PROCEDURE — 36415 COLL VENOUS BLD VENIPUNCTURE: CPT

## 2022-01-21 PROCEDURE — 2060000000 HC ICU INTERMEDIATE R&B

## 2022-01-21 RX ORDER — ACETAMINOPHEN 325 MG/1
650 TABLET ORAL ONCE
Status: COMPLETED | OUTPATIENT
Start: 2022-01-21 | End: 2022-01-21

## 2022-01-21 RX ORDER — SODIUM CHLORIDE 9 MG/ML
INJECTION, SOLUTION INTRAVENOUS CONTINUOUS
Status: DISCONTINUED | OUTPATIENT
Start: 2022-01-21 | End: 2022-01-26

## 2022-01-21 RX ORDER — SODIUM CHLORIDE 9 MG/ML
25 INJECTION, SOLUTION INTRAVENOUS PRN
Status: DISCONTINUED | OUTPATIENT
Start: 2022-01-21 | End: 2022-01-27 | Stop reason: HOSPADM

## 2022-01-21 RX ORDER — PANTOPRAZOLE SODIUM 40 MG/1
40 TABLET, DELAYED RELEASE ORAL DAILY
Status: DISCONTINUED | OUTPATIENT
Start: 2022-01-21 | End: 2022-01-27 | Stop reason: HOSPADM

## 2022-01-21 RX ORDER — BUPROPION HYDROCHLORIDE 150 MG/1
150 TABLET ORAL 2 TIMES DAILY
Status: DISCONTINUED | OUTPATIENT
Start: 2022-01-21 | End: 2022-01-27 | Stop reason: HOSPADM

## 2022-01-21 RX ORDER — BUSPIRONE HYDROCHLORIDE 5 MG/1
15 TABLET ORAL 2 TIMES DAILY
Status: DISCONTINUED | OUTPATIENT
Start: 2022-01-21 | End: 2022-01-27 | Stop reason: HOSPADM

## 2022-01-21 RX ORDER — LOSARTAN POTASSIUM 50 MG/1
50 TABLET ORAL DAILY
Status: DISCONTINUED | OUTPATIENT
Start: 2022-01-21 | End: 2022-01-27 | Stop reason: HOSPADM

## 2022-01-21 RX ORDER — ASPIRIN 81 MG/1
81 TABLET, CHEWABLE ORAL DAILY
Status: DISCONTINUED | OUTPATIENT
Start: 2022-01-21 | End: 2022-01-27 | Stop reason: HOSPADM

## 2022-01-21 RX ORDER — SUCRALFATE 1 G/1
1 TABLET ORAL EVERY 6 HOURS SCHEDULED
Status: DISCONTINUED | OUTPATIENT
Start: 2022-01-21 | End: 2022-01-27 | Stop reason: HOSPADM

## 2022-01-21 RX ORDER — PREDNISONE 20 MG/1
50 TABLET ORAL EVERY 6 HOURS
Status: DISPENSED | OUTPATIENT
Start: 2022-01-21 | End: 2022-01-21

## 2022-01-21 RX ORDER — ONDANSETRON 2 MG/ML
4 INJECTION INTRAMUSCULAR; INTRAVENOUS EVERY 6 HOURS PRN
Status: DISCONTINUED | OUTPATIENT
Start: 2022-01-21 | End: 2022-01-27 | Stop reason: HOSPADM

## 2022-01-21 RX ORDER — ACETAMINOPHEN 325 MG/1
650 TABLET ORAL EVERY 6 HOURS PRN
Status: DISCONTINUED | OUTPATIENT
Start: 2022-01-21 | End: 2022-01-27 | Stop reason: HOSPADM

## 2022-01-21 RX ORDER — ATORVASTATIN CALCIUM 40 MG/1
40 TABLET, FILM COATED ORAL NIGHTLY
Status: DISCONTINUED | OUTPATIENT
Start: 2022-01-21 | End: 2022-01-27 | Stop reason: HOSPADM

## 2022-01-21 RX ORDER — METOPROLOL SUCCINATE 25 MG/1
25 TABLET, EXTENDED RELEASE ORAL DAILY
Status: DISCONTINUED | OUTPATIENT
Start: 2022-01-21 | End: 2022-01-27 | Stop reason: HOSPADM

## 2022-01-21 RX ORDER — ACETAMINOPHEN 650 MG/1
650 SUPPOSITORY RECTAL EVERY 6 HOURS PRN
Status: DISCONTINUED | OUTPATIENT
Start: 2022-01-21 | End: 2022-01-27 | Stop reason: HOSPADM

## 2022-01-21 RX ORDER — CALCIUM CARBONATE-CHOLECALCIFEROL TAB 250 MG-125 UNIT 250-125 MG-UNIT
1 TAB ORAL DAILY
Status: DISCONTINUED | OUTPATIENT
Start: 2022-01-21 | End: 2022-01-27 | Stop reason: HOSPADM

## 2022-01-21 RX ORDER — TOPIRAMATE 25 MG/1
25 TABLET ORAL DAILY
Status: DISCONTINUED | OUTPATIENT
Start: 2022-01-21 | End: 2022-01-27 | Stop reason: HOSPADM

## 2022-01-21 RX ORDER — POTASSIUM CHLORIDE 20 MEQ/1
20 TABLET, EXTENDED RELEASE ORAL DAILY
Status: DISCONTINUED | OUTPATIENT
Start: 2022-01-21 | End: 2022-01-27 | Stop reason: HOSPADM

## 2022-01-21 RX ORDER — DONEPEZIL HYDROCHLORIDE 5 MG/1
5 TABLET, FILM COATED ORAL NIGHTLY
Status: DISCONTINUED | OUTPATIENT
Start: 2022-01-21 | End: 2022-01-27 | Stop reason: HOSPADM

## 2022-01-21 RX ORDER — POTASSIUM CHLORIDE 7.45 MG/ML
10 INJECTION INTRAVENOUS PRN
Status: DISCONTINUED | OUTPATIENT
Start: 2022-01-21 | End: 2022-01-27 | Stop reason: HOSPADM

## 2022-01-21 RX ORDER — POTASSIUM CHLORIDE 20 MEQ/1
40 TABLET, EXTENDED RELEASE ORAL PRN
Status: DISCONTINUED | OUTPATIENT
Start: 2022-01-21 | End: 2022-01-27 | Stop reason: HOSPADM

## 2022-01-21 RX ORDER — DIPHENHYDRAMINE HCL 25 MG
50 TABLET ORAL ONCE
Status: COMPLETED | OUTPATIENT
Start: 2022-01-21 | End: 2022-01-21

## 2022-01-21 RX ORDER — SODIUM CHLORIDE 0.9 % (FLUSH) 0.9 %
10 SYRINGE (ML) INJECTION PRN
Status: DISCONTINUED | OUTPATIENT
Start: 2022-01-21 | End: 2022-01-27 | Stop reason: HOSPADM

## 2022-01-21 RX ORDER — MAGNESIUM SULFATE IN WATER 40 MG/ML
2000 INJECTION, SOLUTION INTRAVENOUS ONCE
Status: COMPLETED | OUTPATIENT
Start: 2022-01-21 | End: 2022-01-21

## 2022-01-21 RX ORDER — SODIUM CHLORIDE 0.9 % (FLUSH) 0.9 %
10 SYRINGE (ML) INJECTION EVERY 12 HOURS SCHEDULED
Status: DISCONTINUED | OUTPATIENT
Start: 2022-01-21 | End: 2022-01-27 | Stop reason: HOSPADM

## 2022-01-21 RX ORDER — TRAMADOL HYDROCHLORIDE 50 MG/1
100 TABLET ORAL 3 TIMES DAILY
Status: DISCONTINUED | OUTPATIENT
Start: 2022-01-21 | End: 2022-01-27 | Stop reason: HOSPADM

## 2022-01-21 RX ORDER — ONDANSETRON 4 MG/1
4 TABLET, ORALLY DISINTEGRATING ORAL EVERY 8 HOURS PRN
Status: DISCONTINUED | OUTPATIENT
Start: 2022-01-21 | End: 2022-01-27 | Stop reason: HOSPADM

## 2022-01-21 RX ORDER — SODIUM CHLORIDE 0.9 % (FLUSH) 0.9 %
10 SYRINGE (ML) INJECTION PRN
Status: DISCONTINUED | OUTPATIENT
Start: 2022-01-21 | End: 2022-01-21 | Stop reason: SDUPTHER

## 2022-01-21 RX ORDER — LEVOTHYROXINE SODIUM 0.07 MG/1
75 TABLET ORAL DAILY
Status: DISCONTINUED | OUTPATIENT
Start: 2022-01-21 | End: 2022-01-27 | Stop reason: HOSPADM

## 2022-01-21 RX ORDER — SENNA PLUS 8.6 MG/1
1 TABLET ORAL DAILY PRN
Status: DISCONTINUED | OUTPATIENT
Start: 2022-01-21 | End: 2022-01-27 | Stop reason: HOSPADM

## 2022-01-21 RX ADMIN — ACETAMINOPHEN 650 MG: 325 TABLET ORAL at 03:47

## 2022-01-21 RX ADMIN — PANTOPRAZOLE SODIUM 40 MG: 40 TABLET, DELAYED RELEASE ORAL at 08:24

## 2022-01-21 RX ADMIN — ATORVASTATIN CALCIUM 40 MG: 40 TABLET, FILM COATED ORAL at 22:08

## 2022-01-21 RX ADMIN — SODIUM CHLORIDE, PRESERVATIVE FREE 10 ML: 5 INJECTION INTRAVENOUS at 03:52

## 2022-01-21 RX ADMIN — LOSARTAN POTASSIUM 50 MG: 50 TABLET, FILM COATED ORAL at 08:59

## 2022-01-21 RX ADMIN — WATER 2000 MG: 1 INJECTION INTRAMUSCULAR; INTRAVENOUS; SUBCUTANEOUS at 03:47

## 2022-01-21 RX ADMIN — PREDNISONE 50 MG: 20 TABLET ORAL at 04:17

## 2022-01-21 RX ADMIN — SODIUM CHLORIDE: 9 INJECTION, SOLUTION INTRAVENOUS at 13:03

## 2022-01-21 RX ADMIN — TOPIRAMATE 25 MG: 25 TABLET, FILM COATED ORAL at 08:59

## 2022-01-21 RX ADMIN — TRAMADOL HYDROCHLORIDE 100 MG: 50 TABLET, COATED ORAL at 13:00

## 2022-01-21 RX ADMIN — IOPAMIDOL 75 ML: 755 INJECTION, SOLUTION INTRAVENOUS at 04:48

## 2022-01-21 RX ADMIN — LEVOTHYROXINE SODIUM 75 MCG: 75 TABLET ORAL at 08:59

## 2022-01-21 RX ADMIN — BUSPIRONE HYDROCHLORIDE 15 MG: 5 TABLET ORAL at 22:08

## 2022-01-21 RX ADMIN — ASPIRIN 81 MG CHEWABLE TABLET 81 MG: 81 TABLET CHEWABLE at 08:24

## 2022-01-21 RX ADMIN — BUSPIRONE HYDROCHLORIDE 15 MG: 5 TABLET ORAL at 08:55

## 2022-01-21 RX ADMIN — CONJUGATED ESTROGENS: 0.62 CREAM VAGINAL at 08:59

## 2022-01-21 RX ADMIN — METOPROLOL SUCCINATE 25 MG: 25 TABLET, FILM COATED, EXTENDED RELEASE ORAL at 08:25

## 2022-01-21 RX ADMIN — DONEPEZIL HYDROCHLORIDE 5 MG: 5 TABLET, FILM COATED ORAL at 22:08

## 2022-01-21 RX ADMIN — SUCRALFATE 1 G: 1 TABLET ORAL at 13:01

## 2022-01-21 RX ADMIN — Medication 250 MG: at 08:59

## 2022-01-21 RX ADMIN — BUPROPION HYDROCHLORIDE 150 MG: 150 TABLET, EXTENDED RELEASE ORAL at 22:08

## 2022-01-21 RX ADMIN — ENOXAPARIN SODIUM 40 MG: 100 INJECTION SUBCUTANEOUS at 08:24

## 2022-01-21 RX ADMIN — DIPHENHYDRAMINE HCL 50 MG: 25 TABLET ORAL at 04:17

## 2022-01-21 RX ADMIN — PREDNISONE 50 MG: 20 TABLET ORAL at 13:01

## 2022-01-21 RX ADMIN — SUCRALFATE 1 G: 1 TABLET ORAL at 08:25

## 2022-01-21 RX ADMIN — MAGNESIUM SULFATE HEPTAHYDRATE 2000 MG: 40 INJECTION, SOLUTION INTRAVENOUS at 08:21

## 2022-01-21 RX ADMIN — POTASSIUM CHLORIDE 20 MEQ: 20 TABLET, EXTENDED RELEASE ORAL at 08:31

## 2022-01-21 RX ADMIN — BUPROPION HYDROCHLORIDE 150 MG: 150 TABLET, EXTENDED RELEASE ORAL at 08:55

## 2022-01-21 ASSESSMENT — ENCOUNTER SYMPTOMS
DIARRHEA: 0
COUGH: 0
EYE REDNESS: 0
NAUSEA: 0
VOMITING: 0
ABDOMINAL PAIN: 0
WHEEZING: 0
ABDOMINAL DISTENTION: 0
BACK PAIN: 0
EYE DISCHARGE: 0
SORE THROAT: 0
SINUS PRESSURE: 0
EYE PAIN: 0
SHORTNESS OF BREATH: 0
DIFFICULTY BREATHING: 0

## 2022-01-21 ASSESSMENT — PAIN SCALES - GENERAL
PAINLEVEL_OUTOF10: 0
PAINLEVEL_OUTOF10: 9

## 2022-01-21 NOTE — ED PROVIDER NOTES
78-year-old female presents emergency department with shortness of breath fatigue confusion and fevers. Patient is from home and per family she has had significant lethargy since last couple days. She is unable provide any history here bedside she is requiring oxygen according to staff at 87% on room air she normally wears no oxygen. She has no known contact with COVID-19 she has no known vaccination. She has no other complaints at this time. The history is provided by the patient. Altered Mental Status  Presenting symptoms: confusion and lethargy    Severity:  Mild  Episode history:  Single  Duration:  3 days  Timing:  Intermittent  Progression:  Waxing and waning  Chronicity:  New  Associated symptoms: decreased appetite, fever and weakness    Associated symptoms: no abdominal pain, normal movement, no agitation, no bladder incontinence, no difficulty breathing, no hallucinations, no headaches, no nausea, no palpitations, no rash and no vomiting         Review of Systems   Constitutional: Positive for decreased appetite and fever. Negative for chills. HENT: Negative for ear pain, sinus pressure and sore throat. Eyes: Negative for pain, discharge and redness. Respiratory: Negative for cough, shortness of breath and wheezing. Cardiovascular: Negative for chest pain and palpitations. Gastrointestinal: Negative for abdominal distention, abdominal pain, diarrhea, nausea and vomiting. Genitourinary: Negative for bladder incontinence, dysuria and frequency. Musculoskeletal: Negative for arthralgias and back pain. Skin: Negative for rash and wound. Neurological: Positive for weakness. Negative for headaches. Hematological: Negative for adenopathy. Psychiatric/Behavioral: Positive for confusion. Negative for agitation and hallucinations. All other systems reviewed and are negative. Physical Exam  Constitutional:       General: She is in acute distress.       Appearance: She is ill-appearing. Cardiovascular:      Rate and Rhythm: Normal rate and regular rhythm. Heart sounds: Normal heart sounds. No murmur heard. Pulmonary:      Effort: Pulmonary effort is normal.      Breath sounds: Normal breath sounds. Abdominal:      Palpations: Abdomen is soft. Musculoskeletal:         General: Normal range of motion. Cervical back: Normal range of motion and neck supple. No rigidity. Skin:     General: Skin is warm. Capillary Refill: Capillary refill takes less than 2 seconds. Neurological:      Mental Status: She is lethargic and confused. Procedures     MDM  Number of Diagnoses or Management Options  Sepsis secondary to UTI [facility])  Diagnosis management comments: Patient seen and examined. Concern for infectious sources. COVID pneumonia urinary tract infections are considerations. Labs and imaging were ordered. Patient had elevated D-dimer so CTA of the chest was ordered revealing no clear evidence of PE or pneumonia. Patient was found of a nitrite positive urinary tract infection also had an axillary temperature of 100.2 so Tylenol was given given elevated white blood cell count is felt patient was likely septic secondary to urinary tract infection. Antibiotics were given after blood cultures were drawn and patient was admitted to a telemetry bed under Dr. Kathryn Luna service            --------------------------------------------- PAST HISTORY ---------------------------------------------  Past Medical History:  has a past medical history of Anxiety, Cardiomyopathy ([facility] Cognitive dysfunction, Depression, History of TIA (transient ischemic attack), Hyperlipidemia, Hypertension, and Thyroid disease. Past Surgical History:  has a past surgical history that includes Thyroid lobectomy; knee surgery; ERCP (N/A, 9/15/2020); Cholecystectomy, laparoscopic (N/A, 9/16/2020); Hysterectomy;  Tonsillectomy; and ERCP (N/A, 10/21/2020). Social History:  reports that she quit smoking about 15 years ago. Her smoking use included cigarettes. She has a 40.00 pack-year smoking history. She has never used smokeless tobacco. She reports current alcohol use. She reports that she does not use drugs. Family History: family history is not on file. The patients home medications have been reviewed.     Allergies: Macrobid [nitrofurantoin monohyd macro], Macrobid [nitrofurantoin], Ciprofloxacin, Iodine, and Penicillins    -------------------------------------------------- RESULTS -------------------------------------------------    LABS:  Results for orders placed or performed during the hospital encounter of 01/21/22   COVID-19, Rapid    Specimen: Nasopharyngeal Swab   Result Value Ref Range    SARS-CoV-2, NAAT Not Detected Not Detected   CBC Auto Differential   Result Value Ref Range    WBC 13.8 (H) 4.5 - 11.5 E9/L    RBC 4.19 3.50 - 5.50 E12/L    Hemoglobin 10.3 (L) 11.5 - 15.5 g/dL    Hematocrit 32.7 (L) 34.0 - 48.0 %    MCV 78.0 (L) 80.0 - 99.9 fL    MCH 24.6 (L) 26.0 - 35.0 pg    MCHC 31.5 (L) 32.0 - 34.5 %    RDW 19.2 (H) 11.5 - 15.0 fL    Platelets 392 108 - 692 E9/L    MPV 10.6 7.0 - 12.0 fL    Anisocytosis 1+     Polychromasia 1+     Poikilocytes 1+     Ovalocytes 1+    Basic Metabolic Panel   Result Value Ref Range    Sodium 128 (L) 132 - 146 mmol/L    Potassium 4.1 3.5 - 5.0 mmol/L    Chloride 95 (L) 98 - 107 mmol/L    CO2 22 22 - 29 mmol/L    Anion Gap 11 7 - 16 mmol/L    Glucose 115 (H) 74 - 99 mg/dL    BUN 22 6 - 23 mg/dL    CREATININE 1.4 (H) 0.5 - 1.0 mg/dL    GFR Non-African American 36 >=60 mL/min/1.73    GFR African American 43     Calcium 9.3 8.6 - 10.2 mg/dL   Hepatic Function Panel   Result Value Ref Range    Total Protein 5.9 (L) 6.4 - 8.3 g/dL    Albumin 3.2 (L) 3.5 - 5.2 g/dL    Alkaline Phosphatase 224 (H) 35 - 104 U/L    ALT 53 (H) 0 - 32 U/L    AST 57 (H) 0 - 31 U/L    Total Bilirubin 0.8 0.0 - 1.2 mg/dL

## 2022-01-21 NOTE — H&P
Internal Medicine History & Physical     Name: Alexander Dickson  : 1940  Chief Complaint: Fatigue, Altered Mental Status, Extremity Weakness, and Fever  Primary Care Physician: Igor Lee MD  Admission date: 2022  Date of service: 2022     History of Present Illness  Jeanne Nelson is a 78y.o. year old female. She presented to the hospital with a chief complaint of fatigue and altered mental status that started on 2022. She was oriented x4, but confused with some bits of her past medical history and history of present illness. She states that her symptoms are moderate in severity. Hydration makes her symptoms better. Nothing in particular makes her symptoms worse. She has chronic dizziness. My resident was able to speak to the patient daughter. She reported that patient spoke to PCP, and was instructed to increase fluid intake and come into the ER if any changes or worsening symptoms occurred. Home COVID was negative, no fever at home,no cough. Patient usually improves with fluid intake with her episodes of dehydration however patient seemed to be getting worse. Daughter decided to bring patient in for concern of altered mental status with associated difficulty with ambulation secondary to mental status/fatigue. Patient reports difficulty eating and drinking, AOx4 on interview, daughter and patient report that patient had a fall Wednesday evening, hit her arm and buttocks, says she fell while trying to get to the bathroom with daughter reporting that patient was ambulating to bathroom when she began to have difficulty with ambulation. Denies any head trauma or loss of conscious at that time     There are no family or friends at bedside. The history is provided by patient and daughter on the phone with permission from patient. Patient is felt to be a somewhat reliable historian, daughter is felt to be a reliable historian.     ED course:   Initial blood work and imaging studies performed. Admission recommended by ED physician. Case discussed with ED provider. Meds in ED consisted of the following:  Medications   sodium chloride flush 0.9 % injection 10 mL (10 mLs IntraVENous Given 1/21/22 0352)   predniSONE (DELTASONE) tablet 50 mg (50 mg Oral Given 1/21/22 0417)   acetaminophen (TYLENOL) tablet 650 mg (650 mg Oral Given 1/21/22 0347)   cefTRIAXone (ROCEPHIN) 2,000 mg in sterile water 20 mL IV syringe (2,000 mg IntraVENous Given 1/21/22 0347)   iopamidol (ISOVUE-370) 76 % injection 75 mL (75 mLs IntraVENous Given 1/21/22 0448)   diphenhydrAMINE (BENADRYL) tablet 50 mg (50 mg Oral Given 1/21/22 0417)       Past Medical History:   Diagnosis Date    Anxiety     Cardiomy[facility]     CHF (congestive heart failure) ([facility]     follows with Dr Tera Ly yearly, patient denies any cardiac symptoms presently.  CKD (chronic kidney disease) stage 3, GFR 30-59 ml/min [facility]     Cognitive dysfunction     Depression     History of TIA (transient ischemic attack)     TIAs ~2002 noted on brain imaging done because pt was having imbalance    Hyperlipidemia     Hypertension     Thyroid disease        Past Surgical History:   Procedure Laterality Date    CHOLECYSTECTOMY, LAPAROSCOPIC N/A 9/16/2020    LAPAROSCOPIC CHOLECYSTECTOMY performed by Veronica Brand MD at [facility] ERCP N/A 9/15/2020    ERCP STENT INSERTION with BALLOON SWEEPING and PAPILLOTOMY performed by Stan Douglas MD at [facility] ENDOSCOPY    ERCP N/A 10/21/2020    ERCP STENT REMOVAL performed by Stan Douglas MD at [address] [place] [address]         Family Medical History:  No pertinent family medical history with regard to current issues.     Social History  Patient lives at home with daughter, and ex-, granddaughter, walks with walker and cane, AOx4 at baseline  Employment: retired  Illicit drug use- denies  TOBACCO:   reports that she quit smoking about 15 years ago. Her smoking use included cigarettes. She has a 40.00 pack-year smoking history. She has never used smokeless tobacco.  ETOH:   reports current alcohol use. Home Medications  Prior to Admission medications    Medication Sig Start Date End Date Taking? Authorizing Provider   buPROPion (WELLBUTRIN XL) 150 MG extended release tablet Take 150 mg by mouth 2 times daily Instructed to take with sip water am of procedure 1/4/18   Historical Provider, MD   busPIRone (BUSPAR) 15 MG tablet Instructed to take with sip water am of procedure 8/14/20   Historical Provider, MD   donepezil (ARICEPT) 5 MG tablet nightly  7/21/20   Historical Provider, MD   PREMARIN 0.625 MG/GM vaginal cream APPLY 0.625MG VAGINALLY THREE TIMES A WEEK 8/6/20   Historical Provider, MD   ondansetron (ZOFRAN) 4 MG tablet TK 1 T PO TID 9/11/20   Historical Provider, MD   sucralfate (CARAFATE) 1 GM tablet Take 1 tablet 4 times a day by oral route.     Historical Provider, MD   metoprolol succinate (TOPROL XL) 25 MG extended release tablet TAKE 1 TABLET BY MOUTH  DAILY  Patient taking differently: Take 25 mg by mouth daily Instructed to take with sip water am of procedure 9/8/20   Vandana Rosales MD   furosemide (LASIX) 40 MG tablet Take 40 mg by mouth daily as needed     Historical Provider, MD   Cholecalciferol (VITAMIN D) 2000 units CAPS capsule Take by mouth daily    Historical Provider, MD   topiramate (TOPAMAX) 25 MG tablet Take 25 mg by mouth daily    Historical Provider, MD   Potassium (POTASSIMIN PO) Take 20 mEq by mouth daily    Historical Provider, MD   atorvastatin (LIPITOR) 40 MG tablet TAKE 1 TABLET BY MOUTH  NIGHTLY 10/14/19   Tesha Wyman MD   Cyanocobalamin (B-12 PO) Take by mouth daily     Historical Provider, MD   CALCIUM-MAGNESIUM-ZINC PO Take by mouth daily    Historical Provider, MD   Cetirizine HCl (ZYRTEC PO) Take by mouth daily    Historical Provider, MD   Fluticasone Propionate (FLONASE NA) by Nasal route Historical Provider, MD   levothyroxine (SYNTHROID) 75 MCG tablet Take 75 mcg by mouth daily  8/4/17   Historical Provider, MD   pantoprazole (PROTONIX) 40 MG tablet Take 40 mg by mouth daily Instructed to take with sip water am of procedure 9/6/17   Historical Provider, MD   losartan (COZAAR) 50 MG tablet Take 1 tablet by mouth daily 9/19/17   Vinod Godfrey,    aspirin 81 MG tablet Take 81 mg by mouth daily Hold 4 days pre-procedure/Dr Alia Schofield    Historical Provider, MD   calcium-vitamin D (OSCAL) 250-125 MG-UNIT per tablet Take 1 tablet by mouth daily    Historical Provider, MD   traMADol (ULTRAM) 50 MG tablet Take 100 mg by mouth 3 times daily. Instructed to take with sip water am of procedure, if needed. Historical Provider, MD       Allergies  Allergies   Allergen Reactions    Macrobid [Nitrofurantoin Monohyd Macro] Nausea Only and Rash    Macrobid [Nitrofurantoin] Anaphylaxis    Ciprofloxacin     Iodine      Only when injected    Penicillins        Review of Systems:   Please see HPI above. All bolded are positive. All un-bolded are negative.   Constitutional Symptoms: fever, chills, fatigue, generalized weakness, diaphoresis, increase in thirst, loss of appetite  Eyes: vision change   Ears, Nose, Mouth, Throat: hearing loss, nasal congestion, sores in the mouth  Cardiovascular: chest pain, chest heaviness, palpitations  Respiratory: shortness of breath, wheezing, coughing  Gastrointestinal: abdominal pain, nausea, vomiting, diarrhea, constipation, melena, hematochezia, hematemesis  Genitourinary: dysuria, hematuria, increased frequency  Musculoskeletal: lower extremity edema, myalgias, arthralgias, back pain  Integumentary: rashes, itching   Neurological: headache, lightheadedness, dizziness, confusion, syncope, numbness, tingling, focal weakness  Psychiatric: depression, suicidal ideation, anxiety  Endocrine: unintentional weight change  Hematologic/Lymphatic: lymphadenopathy, easy bruising, easy bleeding   Allergic/Immunologic: recurrent infections      Objective  VITALS:  /79   Pulse 65   Temp 98.9 °F (37.2 °C) (Oral)   Resp 16   Ht 5' 5\" (1.651 m)   Wt 174 lb (78.9 kg)   SpO2 97%   BMI 28.96 kg/m²     Physical Exam:   General: awake, alert, oriented to person, place, time, and purpose, appears stated age, cooperative, no acute distress, pleasant, appropriate mood, weak and frail, not requiring oxygen  Eyes: conjunctivae/corneas clear, sclera non icteric, EOMI  Ears: no obvious scars, no lesions, no masses, hearing intact  Mouth: mucous membranes dry, no obvious oral sores  Head: normocephalic, atraumatic  Neck: no JVD, no adenopathy, no thyromegaly, neck is supple, trachea is midline  Back: ROM diminished due to, no CVA tenderness.   Chest: no pain on palpation  Lungs: Diminished but clear to auscultation bilaterally, without rhonchi, crackle, wheezing, or rale, no retractions or use of accessory muscles  Heart: regular rate and regular rhythm, no murmur, normal S1, S2  Abdomen: soft, non-tender; bowel sounds normal; no masses, no organomegaly  : Deferred   Extremities: no lower extremity edema, extremities atraumatic, no cyanosis, no clubbing, 2+ pedal pulses palpated  Skin: normal color, normal texture, normal turgor, no rashes, no lesions  Neurologic:5/5 muscle strength throughout, normal muscle tone throughout, face symmetric, hearing intact, tongue midline, speech appropriate without slurring, sensation to fine touch intact in upper and lower extremities    Labs-   Lab Results   Component Value Date    WBC 13.8 (H) 01/21/2022    HGB 10.3 (L) 01/21/2022    HCT 32.7 (L) 01/21/2022     01/21/2022     (L) 01/21/2022    K 4.1 01/21/2022    CL 95 (L) 01/21/2022    CREATININE 1.4 (H) 01/21/2022    BUN 22 01/21/2022    CO2 22 01/21/2022    GLUCOSE 115 (H) 01/21/2022    ALT 53 (H) 01/21/2022    AST 57 (H) 01/21/2022    INR 1.2 09/15/2020     Lab Results   Component Value Date CKTOTAL 332 (H) 09/17/2017    CKMB 13.6 (H) 09/17/2017    TROPONINI <0.01 09/12/2020       Recent Radiological Studies:  CTA PULMONARY W CONTRAST   Final Result   1. No evidence for pulmonary embolism. 2. T6 marked compression deformity is of unknown age. Compression deformity   of L1 is unchanged from 2020.   3. There is a healing fracture involving the left 12th rib. RECOMMENDATIONS:   Unavailable         XR CHEST PORTABLE   Final Result   There is no significant abnormality seen. Assessment  Active Hospital Problems    Diagnosis     Sepsis secondary to UTI ([facility] [A41.9, N39.0]      Priority: High    Elevated transaminase level [R74.01]      Priority: Medium    Pure hypercholesterolemia [E78.00]     Cognitive dysfunction [F09]     Moderate obesity [E66.8]     Nonischemic cardiomyopathy ([address] [I42.8]     Chronic combined systolic and diastolic HF (heart failure) ([address] [I50.42]     History of TIA (transient ischemic attack) [Z86.73]     Hypothyroidism [E03.9]     Essential hypertension [I10]        Patient Active Problem List    Diagnosis Date Noted    Sepsis secondary to UTI ([facility] [address] 01/21/2022     Priority: High    Elevated transaminase level 01/21/2022     Priority: Medium    CKD (chronic kidney disease) stage 3, GFR 30-59 ml/min ([address] 01/21/2022    Pure hypercholesterolemia 10/22/2019    Cognitive dysfunction 10/22/2019    Moderate obesity 02/05/2018    Nonischemic cardiomyopathy ([address] 10/25/2017    Chronic combined systolic and diastolic HF (heart failure) ([address] 10/25/2017    History of TIA (transient ischemic attack)      TIAs ~2002 noted on brain imaging done because pt was having imbalance      Essential hypertension 09/17/2017    Hypothyroidism 09/17/2017       Plan  · UTI/sepsis:   · UA noted. Urine cx pending. · IV Rocephin. · Gentle IVF hydration  · Elevated transaminase measurements-H/o choledocholithiasis:   · H/o cholecystectomy   · Check lipase.     · Right upper quadrant ultrasound  · Hypomagnesemia:   · Replace and monitor  · Weakness/frequent falls:  · PT/OT  · Continue home medications  · Follow labs  · DVT prophylaxis. · Please see orders for further management and care. ·  for discharge planning  · Discharge plan: TBD pending clinical improvement     Brian Milan DO  Emergency Medicine PGY-1  11:42 AM  01/21/22    Addendum: I have personally participated in a face-to-face history and physical exam on the date of service with the patient. I have discussed the case with the resident. I also participated in medical decision making with the resident on the date of service and I agree with all of the pertinent clinical information unless indicated in my editing of the note. I have reviewed and edited the note above based on my findings during my history, exam, and decision making on the same day of service. Electronically signed by Jacob Mccoy DO on 1/21/2022 at 11:44 AM    I can be reached through [address] [place] [address].

## 2022-01-21 NOTE — ED NOTES
Rounded on pt pt given tooth brush and oral care family at bedside. No s/s of distress.  Will monitor     Neeta Austin RN  01/21/22 [id number]

## 2022-01-21 NOTE — ED NOTES
Bed: 28  Expected date:   Expected time:   Means of arrival:   Comments:  ems     Jan Walker RN  01/21/22 [id number]

## 2022-01-21 NOTE — ED NOTES
Assumed care of patient at [address] [place] [address]. Introduced self to patient as RN. Patient linen changed and patient cleaned and re-posistion for comfort. Patietn complains of back pain,9/10. Medicated as ordered. Call light within reach. Awaiting bed assignment.      Katie Stout RN  01/21/22 [id number]

## 2022-01-22 LAB
ACINETOBACTER CALCOAC BAUMANNII COMPLEX BY PCR: NOT DETECTED
ALBUMIN SERPL-MCNC: 2.5 G/DL (ref 3.5–5.2)
ALP BLD-CCNC: 180 U/L (ref 35–104)
ALT SERPL-CCNC: 44 U/L (ref 0–32)
ANION GAP SERPL CALCULATED.3IONS-SCNC: 8 MMOL/L (ref 7–16)
AST SERPL-CCNC: 48 U/L (ref 0–31)
BACTEROIDES FRAGILIS BY PCR: NOT DETECTED
BASOPHILS ABSOLUTE: 0.01 E9/L (ref 0–0.2)
BASOPHILS RELATIVE PERCENT: 0.1 % (ref 0–2)
BILIRUB SERPL-MCNC: 0.2 MG/DL (ref 0–1.2)
BOTTLE TYPE: ABNORMAL
BUN BLDV-MCNC: 22 MG/DL (ref 6–23)
CALCIUM SERPL-MCNC: 9 MG/DL (ref 8.6–10.2)
CANDIDA ALBICANS BY PCR: NOT DETECTED
CANDIDA AURIS BY PCR: NOT DETECTED
CANDIDA GLABRATA BY PCR: NOT DETECTED
CANDIDA KRUSEI BY PCR: NOT DETECTED
CANDIDA PARAPSILOSIS BY PCR: NOT DETECTED
CANDIDA TROPICALIS BY PCR: NOT DETECTED
CARBAPENEM RESISTANCE IMP GENE BY PCR: NOT DETECTED
CARBAPENEM RESISTANCE KPC BY PCR: NOT DETECTED
CARBAPENEM RESISTANCE NDM GENE BY PCR: NOT DETECTED
CARBAPENEM RESISTANCE VIM GENE BY PCR: NOT DETECTED
CEPHALOSPORIN RESISTANCE CTX-M GENE BY PCR: NOT DETECTED
CHLORIDE BLD-SCNC: 101 MMOL/L (ref 98–107)
CO2: 24 MMOL/L (ref 22–29)
CREAT SERPL-MCNC: 1.3 MG/DL (ref 0.5–1)
CRYPTOCOCCUS NEOFORMANS/GATTII BY PCR: NOT DETECTED
ENTEROBACTER CLOACAE COMPLEX BY PCR: NOT DETECTED
ENTEROBACTERALES BY PCR: NOT DETECTED
ENTEROCOCCUS FAECALIS BY PCR: NOT DETECTED
ENTEROCOCCUS FAECIUM BY PCR: NOT DETECTED
EOSINOPHILS ABSOLUTE: 0 E9/L (ref 0.05–0.5)
EOSINOPHILS RELATIVE PERCENT: 0 % (ref 0–6)
ESCHERICHIA COLI BY PCR: NOT DETECTED
GFR AFRICAN AMERICAN: 47
GFR NON-AFRICAN AMERICAN: 39 ML/MIN/1.73
GLUCOSE BLD-MCNC: 126 MG/DL (ref 74–99)
HAEMOPHILUS INFLUENZAE BY PCR: NOT DETECTED
HCT VFR BLD CALC: 28.1 % (ref 34–48)
HEMOGLOBIN: 8.8 G/DL (ref 11.5–15.5)
IMMATURE GRANULOCYTES #: 0.08 E9/L
IMMATURE GRANULOCYTES %: 0.8 % (ref 0–5)
KLEBSIELLA AEROGENES BY PCR: NOT DETECTED
KLEBSIELLA OXYTOCA BY PCR: NOT DETECTED
KLEBSIELLA PNEUMONIAE GROUP BY PCR: NOT DETECTED
LISTERIA MONOCYTOGENES BY PCR: NOT DETECTED
LYMPHOCYTES ABSOLUTE: 0.79 E9/L (ref 1.5–4)
LYMPHOCYTES RELATIVE PERCENT: 7.8 % (ref 20–42)
MCH RBC QN AUTO: 24.7 PG (ref 26–35)
MCHC RBC AUTO-ENTMCNC: 31.3 % (ref 32–34.5)
MCV RBC AUTO: 78.9 FL (ref 80–99.9)
METHICILLIN RESISTANCE MECA/C  BY PCR: DETECTED
MONOCYTES ABSOLUTE: 0.69 E9/L (ref 0.1–0.95)
MONOCYTES RELATIVE PERCENT: 6.8 % (ref 2–12)
NEISSERIA MENINGITIDIS BY PCR: NOT DETECTED
NEUTROPHILS ABSOLUTE: 8.6 E9/L (ref 1.8–7.3)
NEUTROPHILS RELATIVE PERCENT: 84.5 % (ref 43–80)
ORDER NUMBER: ABNORMAL
PDW BLD-RTO: 19.7 FL (ref 11.5–15)
PLATELET # BLD: 161 E9/L (ref 130–450)
PMV BLD AUTO: 10.8 FL (ref 7–12)
POTASSIUM REFLEX MAGNESIUM: 4.2 MMOL/L (ref 3.5–5)
PROTEUS SPECIES BY PCR: NOT DETECTED
PSEUDOMONAS AERUGINOSA BY PCR: DETECTED
RBC # BLD: 3.56 E12/L (ref 3.5–5.5)
SALMONELLA SPECIES BY PCR: NOT DETECTED
SERRATIA MARCESCENS BY PCR: NOT DETECTED
SODIUM BLD-SCNC: 133 MMOL/L (ref 132–146)
SOURCE OF BLOOD CULTURE: ABNORMAL
STAPHYLOCOCCUS AUREUS BY PCR: NOT DETECTED
STAPHYLOCOCCUS EPIDERMIDIS BY PCR: DETECTED
STAPHYLOCOCCUS LUGDUNENSIS BY PCR: NOT DETECTED
STAPHYLOCOCCUS SPECIES BY PCR: DETECTED
STENOTROPHOMONAS MALTOPHILIA BY PCR: NOT DETECTED
STREPTOCOCCUS AGALACTIAE BY PCR: NOT DETECTED
STREPTOCOCCUS PNEUMONIAE BY PCR: NOT DETECTED
STREPTOCOCCUS PYOGENES  BY PCR: NOT DETECTED
STREPTOCOCCUS SPECIES BY PCR: NOT DETECTED
TOTAL PROTEIN: 5.1 G/DL (ref 6.4–8.3)
WBC # BLD: 10.2 E9/L (ref 4.5–11.5)

## 2022-01-22 PROCEDURE — 2060000000 HC ICU INTERMEDIATE R&B

## 2022-01-22 PROCEDURE — 80053 COMPREHEN METABOLIC PANEL: CPT

## 2022-01-22 PROCEDURE — 2700000000 HC OXYGEN THERAPY PER DAY

## 2022-01-22 PROCEDURE — 36415 COLL VENOUS BLD VENIPUNCTURE: CPT

## 2022-01-22 PROCEDURE — 6370000000 HC RX 637 (ALT 250 FOR IP): Performed by: INTERNAL MEDICINE

## 2022-01-22 PROCEDURE — 2580000003 HC RX 258: Performed by: INTERNAL MEDICINE

## 2022-01-22 PROCEDURE — 85025 COMPLETE CBC W/AUTO DIFF WBC: CPT

## 2022-01-22 PROCEDURE — 6360000002 HC RX W HCPCS: Performed by: INTERNAL MEDICINE

## 2022-01-22 RX ADMIN — TRAMADOL HYDROCHLORIDE 100 MG: 50 TABLET, COATED ORAL at 10:07

## 2022-01-22 RX ADMIN — LOSARTAN POTASSIUM 50 MG: 50 TABLET, FILM COATED ORAL at 10:07

## 2022-01-22 RX ADMIN — LEVOTHYROXINE SODIUM 75 MCG: 75 TABLET ORAL at 06:03

## 2022-01-22 RX ADMIN — BUSPIRONE HYDROCHLORIDE 15 MG: 5 TABLET ORAL at 10:07

## 2022-01-22 RX ADMIN — METOPROLOL SUCCINATE 25 MG: 25 TABLET, FILM COATED, EXTENDED RELEASE ORAL at 10:08

## 2022-01-22 RX ADMIN — BUPROPION HYDROCHLORIDE 150 MG: 150 TABLET, EXTENDED RELEASE ORAL at 10:06

## 2022-01-22 RX ADMIN — SODIUM CHLORIDE: 9 INJECTION, SOLUTION INTRAVENOUS at 03:46

## 2022-01-22 RX ADMIN — SUCRALFATE 1 G: 1 TABLET ORAL at 10:08

## 2022-01-22 RX ADMIN — SUCRALFATE 1 G: 1 TABLET ORAL at 00:03

## 2022-01-22 RX ADMIN — DONEPEZIL HYDROCHLORIDE 5 MG: 5 TABLET, FILM COATED ORAL at 21:00

## 2022-01-22 RX ADMIN — ENOXAPARIN SODIUM 40 MG: 100 INJECTION SUBCUTANEOUS at 10:13

## 2022-01-22 RX ADMIN — TRAMADOL HYDROCHLORIDE 100 MG: 50 TABLET, COATED ORAL at 14:51

## 2022-01-22 RX ADMIN — PANTOPRAZOLE SODIUM 40 MG: 40 TABLET, DELAYED RELEASE ORAL at 10:08

## 2022-01-22 RX ADMIN — Medication 250 MG: at 10:08

## 2022-01-22 RX ADMIN — BUPROPION HYDROCHLORIDE 150 MG: 150 TABLET, EXTENDED RELEASE ORAL at 21:00

## 2022-01-22 RX ADMIN — SODIUM CHLORIDE, PRESERVATIVE FREE 1000 MG: 5 INJECTION INTRAVENOUS at 03:36

## 2022-01-22 RX ADMIN — SUCRALFATE 1 G: 1 TABLET ORAL at 06:03

## 2022-01-22 RX ADMIN — BUSPIRONE HYDROCHLORIDE 15 MG: 5 TABLET ORAL at 21:00

## 2022-01-22 RX ADMIN — ATORVASTATIN CALCIUM 40 MG: 40 TABLET, FILM COATED ORAL at 21:00

## 2022-01-22 RX ADMIN — ASPIRIN 81 MG CHEWABLE TABLET 81 MG: 81 TABLET CHEWABLE at 10:07

## 2022-01-22 RX ADMIN — TRAMADOL HYDROCHLORIDE 100 MG: 50 TABLET, COATED ORAL at 21:00

## 2022-01-22 RX ADMIN — TOPIRAMATE 25 MG: 25 TABLET, FILM COATED ORAL at 10:07

## 2022-01-22 RX ADMIN — POTASSIUM CHLORIDE 20 MEQ: 20 TABLET, EXTENDED RELEASE ORAL at 10:08

## 2022-01-22 RX ADMIN — SUCRALFATE 1 G: 1 TABLET ORAL at 18:23

## 2022-01-22 ASSESSMENT — PAIN SCALES - GENERAL
PAINLEVEL_OUTOF10: 7
PAINLEVEL_OUTOF10: 0
PAINLEVEL_OUTOF10: 0
PAINLEVEL_OUTOF10: 6
PAINLEVEL_OUTOF10: 5

## 2022-01-22 ASSESSMENT — PAIN DESCRIPTION - DESCRIPTORS
DESCRIPTORS: ACHING;DISCOMFORT
DESCRIPTORS: ACHING;DISCOMFORT

## 2022-01-22 ASSESSMENT — PAIN DESCRIPTION - PAIN TYPE
TYPE: CHRONIC PAIN
TYPE: CHRONIC PAIN

## 2022-01-22 ASSESSMENT — PAIN DESCRIPTION - ONSET: ONSET: ON-GOING

## 2022-01-22 ASSESSMENT — PAIN - FUNCTIONAL ASSESSMENT
PAIN_FUNCTIONAL_ASSESSMENT: PREVENTS OR INTERFERES SOME ACTIVE ACTIVITIES AND ADLS
PAIN_FUNCTIONAL_ASSESSMENT: PREVENTS OR INTERFERES SOME ACTIVE ACTIVITIES AND ADLS

## 2022-01-22 ASSESSMENT — PAIN DESCRIPTION - PROGRESSION: CLINICAL_PROGRESSION: NOT CHANGED

## 2022-01-22 ASSESSMENT — PAIN DESCRIPTION - ORIENTATION
ORIENTATION: LOWER
ORIENTATION: MID;LOWER

## 2022-01-22 ASSESSMENT — PAIN DESCRIPTION - LOCATION
LOCATION: BACK
LOCATION: BACK

## 2022-01-22 ASSESSMENT — PAIN DESCRIPTION - FREQUENCY: FREQUENCY: CONTINUOUS

## 2022-01-22 NOTE — PROGRESS NOTES
Internal Medicine Progress Note    Admission date: 1/21/2022  Primary care physician: Don Machado MD    Subjective  Grace Alvarez was seen and examined at bedside today. No family present during my examination. Grace Alvarez states that she is feeling much better. Discussed about events leading to hospitalization. Eating and drinking well. Review of Systems  There are no new complaints of chest pain, shortness of breath, abdominal pain, nausea, vomiting, diarrhea, constipation, headaches, fevers, chills, lightheadedness, dizziness, calf pain.     Hospital Medications  Current Facility-Administered Medications   Medication Dose Route Frequency Provider Last Rate Last Admin    cefTRIAXone (ROCEPHIN) 1,000 mg in sodium chloride flush 10 mL IV syringe  1,000 mg IntraVENous Q24H Vinod E Volino, DO   1,000 mg at 01/22/22 3334    sodium chloride flush 0.9 % injection 10 mL  10 mL IntraVENous 2 times per day Vinod E Volino, DO        sodium chloride flush 0.9 % injection 10 mL  10 mL IntraVENous PRN Vinod E Volino, DO        0.9 % sodium chloride infusion  25 mL IntraVENous PRN Vinod E Volino, DO        potassium chloride (KLOR-CON M) extended release tablet 40 mEq  40 mEq Oral PRN Vinod E Volino, DO        Or    potassium bicarb-citric acid (EFFER-K) effervescent tablet 40 mEq  40 mEq Oral PRN Vinod E Volino, DO        Or    potassium chloride 10 mEq/100 mL IVPB (Peripheral Line)  10 mEq IntraVENous PRN Vinod E Volino, DO        enoxaparin (LOVENOX) injection 40 mg  40 mg SubCUTAneous Daily Vinod E Volino, DO   40 mg at 01/21/22 0824    ondansetron (ZOFRAN-ODT) disintegrating tablet 4 mg  4 mg Oral Q8H PRN Vinod E Volino, DO        Or    ondansetron (ZOFRAN) injection 4 mg  4 mg IntraVENous Q6H PRN Vinod E Volino, DO        senna (SENOKOT) tablet 8.6 mg  1 tablet Oral Daily PRN Vinod E Volino, DO        acetaminophen (TYLENOL) tablet 650 mg  650 mg Oral Q6H PRN Vinod E Volino, DO        Or    acetaminophen (TYLENOL) suppository 650 mg  650 mg Rectal Q6H PRN Vinod Godfrey, DO        traMADol (ULTRAM) tablet 100 mg  100 mg Oral TID Vinod Godfrey, DO   100 mg at 01/21/22 1300    aspirin chewable tablet 81 mg  81 mg Oral Daily Vinod Cravenino, DO   81 mg at 01/21/22 0824    Calcium Carb-Cholecalciferol 250-125 MG-UNIT TABS 250 mg  1 tablet Oral Daily Vinod Godfrey, DO   250 mg at 01/21/22 0859    losartan (COZAAR) tablet 50 mg  50 mg Oral Daily Vinod Cravenino, DO   50 mg at 01/21/22 0859    levothyroxine (SYNTHROID) tablet 75 mcg  75 mcg Oral Daily Vinod Godfrey DO   75 mcg at 01/22/22 0603    pantoprazole (PROTONIX) tablet 40 mg  40 mg Oral Daily Vinod Godfrey, DO   40 mg at 01/21/22 0824    atorvastatin (LIPITOR) tablet 40 mg  40 mg Oral Nightly Vinod Godfrey, DO   40 mg at 01/21/22 2208    topiramate (TOPAMAX) tablet 25 mg  25 mg Oral Daily Vinod Godfrey, DO   25 mg at 01/21/22 0859    metoprolol succinate (TOPROL XL) extended release tablet 25 mg  25 mg Oral Daily Vinod Godfrey, DO   25 mg at 01/21/22 0825    buPROPion (WELLBUTRIN XL) extended release tablet 150 mg  150 mg Oral BID Vinod Godfrey, DO   150 mg at 01/21/22 2208    busPIRone (BUSPAR) tablet 15 mg  15 mg Oral BID Vinod Godfrey, DO   15 mg at 01/21/22 2208    donepezil (ARICEPT) tablet 5 mg  5 mg Oral Nightly Vinod Cravenino, DO   5 mg at 01/21/22 2208    conjugated estrogens (PREMARIN) vaginal cream   Vaginal Once per day on Mon Wed Fri Floreen Snowolivier Cravenino, DO   Given at 01/21/22 3637    sucralfate (CARAFATE) tablet 1 g  1 g Oral 4 times per day Vinod Godfrey, DO   1 g at 01/22/22 0603    potassium chloride (KLOR-CON M) extended release tablet 20 mEq  20 mEq Oral Daily Vinod Godfrey DO   20 mEq at 01/21/22 0831    0.9 % sodium chloride infusion   IntraVENous Continuous Vinod Godfrey, DO 75 mL/hr at 01/22/22 0346 New Bag at 01/22/22 0346       PRN Medications  sodium chloride flush, sodium chloride, potassium chloride **OR** potassium alternative oral replacement **OR** potassium chloride, ondansetron **OR** ondansetron, senna, acetaminophen **OR** acetaminophen    Objective  Most Recent Recorded Vitals  /65   Pulse 66   Temp 97.6 °F (36.4 °C) (Oral)   Resp 18   Ht 5' 5\" (1.651 m)   Wt 173 lb (78.5 kg)   SpO2 96%   BMI 28.79 kg/m²   No intake/output data recorded. No intake/output data recorded. Physical Exam:  General: AAO to person, place, time, and purpose, NAD, no labored breathing  Eyes: conjunctivae/corneas clear, sclera non icteric. Skin: color, texture, and turgor normal, no rashes or lesions. Lungs: clear to auscultation bilaterally, no retractions or use of accessory muscles, no vocal fremitus, no rhonchi, no crackle, no rales  Heart: regular rate and regular rhythm, no murmur  Abdomen: soft, non-tender; bowel sounds normal; no masses,  no organomegaly  Extremities: atraumatic, no cyanosis, no edema  Neurologic: cranial nerves 2-12 grossly intact, no slurred speech. Most Recent Labs  Lab Results   Component Value Date    WBC 10.2 01/22/2022    HGB 8.8 (L) 01/22/2022    HCT 28.1 (L) 01/22/2022     01/22/2022     01/22/2022    K 4.2 01/22/2022     01/22/2022    CREATININE 1.3 (H) 01/22/2022    BUN 22 01/22/2022    CO2 24 01/22/2022    GLUCOSE 126 (H) 01/22/2022    ALT 44 (H) 01/22/2022    AST 48 (H) 01/22/2022    INR 1.2 09/15/2020       *All labs in electric medical record were reviewed. Please see electronic chart for a more comprehensive set of labs    XR CHEST PORTABLE    Result Date: 1/21/2022  EXAMINATION: ONE XRAY VIEW OF THE CHEST 1/21/2022 4:00 am COMPARISON: 09/18/2017 HISTORY: ORDERING SYSTEM PROVIDED HISTORY: eval for pneumonia TECHNOLOGIST PROVIDED HISTORY: Reason for exam:->eval for pneumonia FINDINGS: There is no cardiomegaly. There is no pulmonary vascular congestion or infiltrate. There is minimal subsegmental atelectasis in lower left lung.  There is no pleural effusion. There is no pneumothorax. There is no significant abnormality seen. CTA PULMONARY W CONTRAST    Result Date: 1/21/2022  EXAMINATION: CTA OF THE CHEST 1/21/2022 4:48 am TECHNIQUE: CTA of the chest was performed after the administration of intravenous contrast.  Multiplanar reformatted images are provided for review. MIP images are provided for review. Dose modulation, iterative reconstruction, and/or weight based adjustment of the mA/kV was utilized to reduce the radiation dose to as low as reasonably achievable. COMPARISON: CT abdomen of 09/12/2020 HISTORY: ORDERING SYSTEM PROVIDED HISTORY: eval for PE TECHNOLOGIST PROVIDED HISTORY: Reason for exam:->eval for PE Decision Support Exception - unselect if not a suspected or confirmed emergency medical condition->Emergency Medical Condition (MA) FINDINGS: Pulmonary Arteries: Pulmonary arteries are adequately opacified for evaluation. No evidence of intraluminal filling defect to suggest pulmonary embolism. Main pulmonary artery is normal in caliber. Mediastinum: No evidence of mediastinal lymphadenopathy. The heart and pericardium demonstrate no acute abnormality. There is no aortic dissection seen. There is calcified plaque involving thoracic aorta. Lungs/pleura: There is mild dependent atelectasis at the lung bases. There is no acute infiltrate seen. There is no pleural effusion or pneumothorax. Upper Abdomen: Limited images of the upper abdomen demonstrate no acute abnormality. Status post interval cholecystectomy. Bones: There is a T6 marked compression deformity which is of unknown age. Compression deformity of L1 is unchanged. There is a healing fracture involving the left 12th rib. 1. No evidence for pulmonary embolism. 2. T6 marked compression deformity is of unknown age. Compression deformity of L1 is unchanged from 2020. 3. There is a healing fracture involving the left 12th rib.  RECOMMENDATIONS: Unavailable     US ABDOMEN LIMITED Specify organ? LIVER, GALLBLADDER    Result Date: 1/21/2022  EXAMINATION: RIGHT UPPER QUADRANT ULTRASOUND 1/21/2022 9:14 am COMPARISON: None. HISTORY: ORDERING SYSTEM PROVIDED HISTORY: elevated transaminase measurements TECHNOLOGIST PROVIDED HISTORY: Reason for exam:->elevated transaminase measurements Specify organ?->LIVER Specify organ?->GALLBLADDER What reading provider will be dictating this exam?->CRC FINDINGS: LIVER:  There is slight coarsened echotexture of the liver without evidence of focal lesion. There is subtle suggestion of nodular contour. BILIARY SYSTEM:  Status post cholecystectomy. Common bile duct measures approximately 3.2 mm in diameter RIGHT KIDNEY: The right kidney measures approximately 7.6 cm in length without evidence of hydronephrosis. There is cortical thinning and lobular contour of the right kidney. There is a cyst measuring approximately 1.3 cm in size. There is 2nd probable cyst measuring approximately 1 cm in size. PANCREAS:  Visualized portions of the pancreas are unremarkable. There appears to be mild dilatation of the pancreatic duct. 1. There appears to be dilatation of the pancreatic duct without ultrasound evidence of focal pancreatic lesion. Consider CT or MRI for further evaluation. 2. Status post cholecystectomy without biliary ductal dilatation. 3. Coarsened echotexture of the liver parenchyma with possible slight nodular contour but no evidence of focal lesion. MICROBIOLOGY:  BLOOD CX #1  No results for input(s): BC in the last 72 hours. BLOOD CX #2  No results for input(s): Katie Isaura in the last 72 hours. CULTURE, RESPIRATORY   No results for input(s): CULTRESP in the last 72 hours. RESPIRATORY SMEAR  No results for input(s): RESPSMEAR in the last 72 hours. STREP PNEUMONIA AG URINE  No results for input(s): STREPNEUMAGU in the last 72 hours. LEGIONELLA AG URINE  No results for input(s): LEGUR in the last 72 hours.   No results for input(s): [facility] in the last 72 hours. URINE CULTURE  No results for input(s): LABURIN in the last 72 hours. WOUND ABSCESS  No results for input(s): WNDABS in the last 72 hours. TIP CULTURE  No results for input(s): CXCATHTIP in the last 72 hours. BODY FLUID CULTURE  No results for input(s): BFCS in the last 72 hours. Anaerobic cx  No results for input(s): LABANAE in the last 72 hours. CULTURE SURGICAL  No results for input(s): CXSURG in the last 72 hours. MISC. SENDOUT  No results for input(s): MREF in the last 72 hours. Assessment/Plan  Cristino Lopez is a 78y.o. year old female who presented on 1/21/2022 and is being treated for Sepsis secondary to UTI ([facility] [address] . Complete Problem List:    Patient Active Problem List    Diagnosis Date Noted    Elevated transaminase level 01/21/2022    Essential hypertension 09/17/2017    Hypothyroidism 09/17/2017    Sepsis secondary to UTI ([address] 01/21/2022    CKD (chronic kidney disease) stage 3, GFR 30-59 ml/min ([address] 01/21/2022    Pure hypercholesterolemia 10/22/2019    Cognitive dysfunction 10/22/2019    Moderate obesity 02/05/2018    Nonischemic cardiomyopathy ([address] 10/25/2017    Chronic combined systolic and diastolic HF (heart failure) ([address] 10/25/2017    History of TIA (transient ischemic attack)      Plan  · UTI/sepsis:   ? UA noted. Urine cx pending. - checked no culture sent. ? IV Rocephin. ? Gentle IVF hydration  ? Improving, baring set back transition to oral antibiotic tomorrow? · Elevated transaminase measurements-H/o choledocholithiasis:   ? H/o cholecystectomy   ? Check lipase - wnl    ? Right upper quadrant ultrasound - pancreatic duct dilatation - will see if can get MRCP  · Hypomagnesemia:   ? Replace and monitor  · Weakness/frequent falls:  ? PT/OT  · Continue home medications  · Follow labs  · DVT prophylaxis. · Please see orders for further management and care.   ·  for discharge planning  · Discharge plan: TBD pending clinical improvement     Electronically signed by Pedro Spicer MD on 1/22/2022 at 8:59 AM      NOTE:  This report was transcribed using voice recognition software. Every effort was made to ensure accuracy; however, inadvertent computerized transcription errors may be present.

## 2022-01-23 ENCOUNTER — APPOINTMENT (OUTPATIENT)
Dept: MRI IMAGING | Age: 82
DRG: 871 | End: 2022-01-23
Payer: MEDICARE

## 2022-01-23 LAB
ALBUMIN SERPL-MCNC: 2.5 G/DL (ref 3.5–5.2)
ALP BLD-CCNC: 184 U/L (ref 35–104)
ALT SERPL-CCNC: 57 U/L (ref 0–32)
ANION GAP SERPL CALCULATED.3IONS-SCNC: 9 MMOL/L (ref 7–16)
AST SERPL-CCNC: 58 U/L (ref 0–31)
BASOPHILS ABSOLUTE: 0.03 E9/L (ref 0–0.2)
BASOPHILS RELATIVE PERCENT: 0.3 % (ref 0–2)
BILIRUB SERPL-MCNC: <0.2 MG/DL (ref 0–1.2)
BUN BLDV-MCNC: 23 MG/DL (ref 6–23)
CALCIUM SERPL-MCNC: 9.1 MG/DL (ref 8.6–10.2)
CHLORIDE BLD-SCNC: 103 MMOL/L (ref 98–107)
CO2: 21 MMOL/L (ref 22–29)
CREAT SERPL-MCNC: 1.3 MG/DL (ref 0.5–1)
EOSINOPHILS ABSOLUTE: 0.05 E9/L (ref 0.05–0.5)
EOSINOPHILS RELATIVE PERCENT: 0.4 % (ref 0–6)
GFR AFRICAN AMERICAN: 47
GFR NON-AFRICAN AMERICAN: 39 ML/MIN/1.73
GLUCOSE BLD-MCNC: 77 MG/DL (ref 74–99)
HCT VFR BLD CALC: 29.1 % (ref 34–48)
HEMOGLOBIN: 8.9 G/DL (ref 11.5–15.5)
IMMATURE GRANULOCYTES #: 0.15 E9/L
IMMATURE GRANULOCYTES %: 1.3 % (ref 0–5)
LYMPHOCYTES ABSOLUTE: 2.12 E9/L (ref 1.5–4)
LYMPHOCYTES RELATIVE PERCENT: 18.7 % (ref 20–42)
MCH RBC QN AUTO: 24.3 PG (ref 26–35)
MCHC RBC AUTO-ENTMCNC: 30.6 % (ref 32–34.5)
MCV RBC AUTO: 79.5 FL (ref 80–99.9)
MONOCYTES ABSOLUTE: 1.5 E9/L (ref 0.1–0.95)
MONOCYTES RELATIVE PERCENT: 13.2 % (ref 2–12)
NEUTROPHILS ABSOLUTE: 7.49 E9/L (ref 1.8–7.3)
NEUTROPHILS RELATIVE PERCENT: 66.1 % (ref 43–80)
PDW BLD-RTO: 19.9 FL (ref 11.5–15)
PLATELET # BLD: 193 E9/L (ref 130–450)
PMV BLD AUTO: 11.6 FL (ref 7–12)
POTASSIUM REFLEX MAGNESIUM: 4.4 MMOL/L (ref 3.5–5)
RBC # BLD: 3.66 E12/L (ref 3.5–5.5)
SODIUM BLD-SCNC: 133 MMOL/L (ref 132–146)
TOTAL PROTEIN: 5.1 G/DL (ref 6.4–8.3)
WBC # BLD: 11.3 E9/L (ref 4.5–11.5)

## 2022-01-23 PROCEDURE — 6360000002 HC RX W HCPCS: Performed by: INTERNAL MEDICINE

## 2022-01-23 PROCEDURE — 2580000003 HC RX 258: Performed by: INTERNAL MEDICINE

## 2022-01-23 PROCEDURE — 6370000000 HC RX 637 (ALT 250 FOR IP): Performed by: INTERNAL MEDICINE

## 2022-01-23 PROCEDURE — 36415 COLL VENOUS BLD VENIPUNCTURE: CPT

## 2022-01-23 PROCEDURE — 2060000000 HC ICU INTERMEDIATE R&B

## 2022-01-23 PROCEDURE — 85025 COMPLETE CBC W/AUTO DIFF WBC: CPT

## 2022-01-23 PROCEDURE — 80053 COMPREHEN METABOLIC PANEL: CPT

## 2022-01-23 PROCEDURE — 74181 MRI ABDOMEN W/O CONTRAST: CPT

## 2022-01-23 RX ADMIN — VANCOMYCIN HYDROCHLORIDE 1000 MG: 1 INJECTION, POWDER, LYOPHILIZED, FOR SOLUTION INTRAVENOUS at 11:31

## 2022-01-23 RX ADMIN — TOPIRAMATE 25 MG: 25 TABLET, FILM COATED ORAL at 15:24

## 2022-01-23 RX ADMIN — SUCRALFATE 1 G: 1 TABLET ORAL at 15:26

## 2022-01-23 RX ADMIN — SODIUM CHLORIDE, PRESERVATIVE FREE 1000 MG: 5 INJECTION INTRAVENOUS at 03:36

## 2022-01-23 RX ADMIN — TRAMADOL HYDROCHLORIDE 100 MG: 50 TABLET, COATED ORAL at 21:00

## 2022-01-23 RX ADMIN — DONEPEZIL HYDROCHLORIDE 5 MG: 5 TABLET, FILM COATED ORAL at 21:00

## 2022-01-23 RX ADMIN — SUCRALFATE 1 G: 1 TABLET ORAL at 23:39

## 2022-01-23 RX ADMIN — PANTOPRAZOLE SODIUM 40 MG: 40 TABLET, DELAYED RELEASE ORAL at 15:24

## 2022-01-23 RX ADMIN — SUCRALFATE 1 G: 1 TABLET ORAL at 00:11

## 2022-01-23 RX ADMIN — POTASSIUM CHLORIDE 20 MEQ: 20 TABLET, EXTENDED RELEASE ORAL at 15:23

## 2022-01-23 RX ADMIN — TRAMADOL HYDROCHLORIDE 100 MG: 50 TABLET, COATED ORAL at 15:23

## 2022-01-23 RX ADMIN — ATORVASTATIN CALCIUM 40 MG: 40 TABLET, FILM COATED ORAL at 21:00

## 2022-01-23 RX ADMIN — LOSARTAN POTASSIUM 50 MG: 50 TABLET, FILM COATED ORAL at 15:23

## 2022-01-23 RX ADMIN — METOPROLOL SUCCINATE 25 MG: 25 TABLET, FILM COATED, EXTENDED RELEASE ORAL at 15:24

## 2022-01-23 RX ADMIN — ASPIRIN 81 MG CHEWABLE TABLET 81 MG: 81 TABLET CHEWABLE at 15:24

## 2022-01-23 RX ADMIN — BUPROPION HYDROCHLORIDE 150 MG: 150 TABLET, EXTENDED RELEASE ORAL at 22:11

## 2022-01-23 RX ADMIN — BUSPIRONE HYDROCHLORIDE 15 MG: 5 TABLET ORAL at 15:23

## 2022-01-23 RX ADMIN — Medication 250 MG: at 15:23

## 2022-01-23 RX ADMIN — SODIUM CHLORIDE: 9 INJECTION, SOLUTION INTRAVENOUS at 15:37

## 2022-01-23 RX ADMIN — ENOXAPARIN SODIUM 40 MG: 100 INJECTION SUBCUTANEOUS at 09:38

## 2022-01-23 RX ADMIN — Medication 10 ML: at 15:23

## 2022-01-23 RX ADMIN — CEFEPIME HYDROCHLORIDE 2000 MG: 2 INJECTION, POWDER, FOR SOLUTION INTRAVENOUS at 10:52

## 2022-01-23 RX ADMIN — BUSPIRONE HYDROCHLORIDE 15 MG: 5 TABLET ORAL at 22:12

## 2022-01-23 RX ADMIN — BUPROPION HYDROCHLORIDE 150 MG: 150 TABLET, EXTENDED RELEASE ORAL at 15:24

## 2022-01-23 ASSESSMENT — PAIN DESCRIPTION - LOCATION
LOCATION: BACK
LOCATION: BACK

## 2022-01-23 ASSESSMENT — PAIN SCALES - GENERAL
PAINLEVEL_OUTOF10: 3
PAINLEVEL_OUTOF10: 5
PAINLEVEL_OUTOF10: 5
PAINLEVEL_OUTOF10: 0

## 2022-01-23 ASSESSMENT — PAIN DESCRIPTION - ORIENTATION: ORIENTATION: LOWER

## 2022-01-23 ASSESSMENT — PAIN DESCRIPTION - PAIN TYPE
TYPE: CHRONIC PAIN
TYPE: CHRONIC PAIN

## 2022-01-23 ASSESSMENT — PAIN - FUNCTIONAL ASSESSMENT: PAIN_FUNCTIONAL_ASSESSMENT: PREVENTS OR INTERFERES SOME ACTIVE ACTIVITIES AND ADLS

## 2022-01-23 ASSESSMENT — PAIN DESCRIPTION - ONSET: ONSET: ON-GOING

## 2022-01-23 ASSESSMENT — PAIN DESCRIPTION - PROGRESSION: CLINICAL_PROGRESSION: NOT CHANGED

## 2022-01-23 ASSESSMENT — PAIN DESCRIPTION - DESCRIPTORS: DESCRIPTORS: ACHING;DISCOMFORT;CONSTANT

## 2022-01-23 ASSESSMENT — PAIN DESCRIPTION - FREQUENCY: FREQUENCY: CONTINUOUS

## 2022-01-23 NOTE — PROGRESS NOTES
MRI abdomen originally ordered W+WO contrast but patient repeatedly used call button during exam complaining of back pain and eventually refused to continue test.  Entire WO contrast exam obtained prior to patient's refusal to continue, so order changed to [place] 250 contrast and sent to radiologists. If the W contrast portion is still needed, and if patient is willing, an MRI abdomen W contrast order will need to be placed.

## 2022-01-23 NOTE — PROGRESS NOTES
Pharmacy Consultation Note  (Antibiotic Dosing and Monitoring)    Initial consult date: 1/23/22  Consulting physician/provider: Dr Angeles Chew  Drug: Vancomycin  Indication: Gram neg rods/Gram pos cocci in clusters bacteremia    Age/  Gender Height Weight IBW  Allergy Information   81 y.o./female 5' 5\" (165.1 cm) 174 lb (78.9 kg)     Ideal body weight: 57 kg (125 lb 10.6 oz)  Adjusted ideal body weight: 64.6 kg (142 lb 7.6 oz)   Macrobid [nitrofurantoin monohyd macro], Macrobid [nitrofurantoin], Ciprofloxacin, Iodine, and Penicillins      Renal Function:  Recent Labs     01/21/22  0336 01/22/22  0400 01/23/22  0230   BUN 22 22 23   CREATININE 1.4* 1.3* 1.3*       Intake/Output Summary (Last 24 hours) at 1/23/2022 0938  Last data filed at 1/23/2022 0430  Gross per 24 hour   Intake 1905 ml   Output --   Net 1905 ml       Vancomycin Monitoring:  Trough:  No results for input(s): VANCOTROUGH in the last 72 hours. Random:  No results for input(s): VANCORANDOM in the last 72 hours. Vancomycin Administration Times:      Assessment:  · Patient is a 78 y.o. female who has been initiated on vancomycin  · Estimated Creatinine Clearance: 33 mL/min (A) (based on SCr of 1.3 mg/dL (H)).   · To dose vancomycin, pharmacy will be utilizing [employer] calculation software for goal AUC/EDUARDO 400-600 mg/L-hr    Plan:  · Will order vancomycin 1000 mg IV every 24 hours  · Will check vancomycin levels when appropriate  · Will continue to monitor renal function   · Clinical pharmacy to follow    Chuck Diaz PharmD, BCPS, BCCCP  1/23/2022  9:44 AM

## 2022-01-23 NOTE — PROGRESS NOTES
Internal Medicine Progress Note    Admission date: 1/21/2022  Primary care physician: Ginette Leach MD    Subjective  Lady Awad was seen and examined at bedside today. No family present during my examination. Lady Awad states that she is feeling much better. Yesterday blood cultures positive with MRSA and Pseudomonas. Pending MRCP today. Review of Systems  There are no new complaints of chest pain, shortness of breath, abdominal pain, nausea, vomiting, diarrhea, constipation, headaches, fevers, chills, lightheadedness, dizziness, calf pain.     Hospital Medications  Current Facility-Administered Medications   Medication Dose Route Frequency Provider Last Rate Last Admin    vancomycin 1000 mg IVPB in 250 mL D5W addavial  1,000 mg IntraVENous Q24H Ginette Leach  mL/hr at 01/23/22 1131 1,000 mg at 01/23/22 1131    [START ON 1/24/2022] cefepime (MAXIPIME) 2000 mg IVPB minibag  2,000 mg IntraVENous Q24H Ginette Leach MD        sodium chloride flush 0.9 % injection 10 mL  10 mL IntraVENous 2 times per day Vinod E Volino, DO        sodium chloride flush 0.9 % injection 10 mL  10 mL IntraVENous PRN Vinod Cravenino, DO        0.9 % sodium chloride infusion  25 mL IntraVENous PRN Vinod SWAN Volino, DO        potassium chloride (KLOR-CON M) extended release tablet 40 mEq  40 mEq Oral PRN Vinod Cravenino, DO        Or    potassium bicarb-citric acid (EFFER-K) effervescent tablet 40 mEq  40 mEq Oral PRN Vinod E Volino, DO        Or    potassium chloride 10 mEq/100 mL IVPB (Peripheral Line)  10 mEq IntraVENous PRN Vinod E Volino, DO        enoxaparin (LOVENOX) injection 40 mg  40 mg SubCUTAneous Daily Vinod E Volino, DO   40 mg at 01/23/22 4280    ondansetron (ZOFRAN-ODT) disintegrating tablet 4 mg  4 mg Oral Q8H PRN Vinod E Volino, DO        Or    ondansetron (ZOFRAN) injection 4 mg  4 mg IntraVENous Q6H PRN Vinod SWNA Volino, DO        senna (SENOKOT) tablet 8.6 mg  1 tablet Oral Daily PRN Vinod E Merissaino, DO  acetaminophen (TYLENOL) tablet 650 mg  650 mg Oral Q6H PRN Vinod Godfrey, DO        Or    acetaminophen (TYLENOL) suppository 650 mg  650 mg Rectal Q6H PRN Vinod Godfrey, DO        traMADol (ULTRAM) tablet 100 mg  100 mg Oral TID Vinod Godfrey, DO   100 mg at 01/22/22 2100    aspirin chewable tablet 81 mg  81 mg Oral Daily Vinod Godfrey, DO   81 mg at 01/22/22 1007    Calcium Carb-Cholecalciferol 250-125 MG-UNIT TABS 250 mg  1 tablet Oral Daily Vinod Godfrey, DO   250 mg at 01/22/22 1008    losartan (COZAAR) tablet 50 mg  50 mg Oral Daily Vinod Godfrey, DO   50 mg at 01/22/22 1007    levothyroxine (SYNTHROID) tablet 75 mcg  75 mcg Oral Daily Vinod Godfrey, DO   75 mcg at 01/22/22 0603    pantoprazole (PROTONIX) tablet 40 mg  40 mg Oral Daily Vinod Godfrey, DO   40 mg at 01/22/22 1008    atorvastatin (LIPITOR) tablet 40 mg  40 mg Oral Nightly Vinod Godfrey, DO   40 mg at 01/22/22 2100    topiramate (TOPAMAX) tablet 25 mg  25 mg Oral Daily Vinod Godfrey, DO   25 mg at 01/22/22 1007    metoprolol succinate (TOPROL XL) extended release tablet 25 mg  25 mg Oral Daily Vinod Godfrey, DO   25 mg at 01/22/22 1008    buPROPion (WELLBUTRIN XL) extended release tablet 150 mg  150 mg Oral BID Vinod Godfrey DO   150 mg at 01/22/22 2100    busPIRone (BUSPAR) tablet 15 mg  15 mg Oral BID Vinod Godfrey, DO   15 mg at 01/22/22 2100    donepezil (ARICEPT) tablet 5 mg  5 mg Oral Nightly Vinod Godfrey, DO   5 mg at 01/22/22 2100    conjugated estrogens (PREMARIN) vaginal cream   Vaginal Once per day on Mon Wed Fri Marti Cravenino, DO   Given at 01/21/22 1705    sucralfate (CARAFATE) tablet 1 g  1 g Oral 4 times per day Vinod Godfrey, DO   1 g at 01/23/22 0011    potassium chloride (KLOR-CON M) extended release tablet 20 mEq  20 mEq Oral Daily Vinod Godfrey, DO   20 mEq at 01/22/22 1008    0.9 % sodium chloride infusion   IntraVENous Continuous So Vazqeuz MD 50 mL/hr at 01/22/22 1004 Rate Change deformity of L1 is unchanged from 2020. 3. There is a healing fracture involving the left 12th rib. RECOMMENDATIONS: Unavailable     US ABDOMEN LIMITED Specify organ? LIVER, GALLBLADDER    Result Date: 1/21/2022  EXAMINATION: RIGHT UPPER QUADRANT ULTRASOUND 1/21/2022 9:14 am COMPARISON: None. HISTORY: ORDERING SYSTEM PROVIDED HISTORY: elevated transaminase measurements TECHNOLOGIST PROVIDED HISTORY: Reason for exam:->elevated transaminase measurements Specify organ?->LIVER Specify organ?->GALLBLADDER What reading provider will be dictating this exam?->CRC FINDINGS: LIVER:  There is slight coarsened echotexture of the liver without evidence of focal lesion. There is subtle suggestion of nodular contour. BILIARY SYSTEM:  Status post cholecystectomy. Common bile duct measures approximately 3.2 mm in diameter RIGHT KIDNEY: The right kidney measures approximately 7.6 cm in length without evidence of hydronephrosis. There is cortical thinning and lobular contour of the right kidney. There is a cyst measuring approximately 1.3 cm in size. There is 2nd probable cyst measuring approximately 1 cm in size. PANCREAS:  Visualized portions of the pancreas are unremarkable. There appears to be mild dilatation of the pancreatic duct. 1. There appears to be dilatation of the pancreatic duct without ultrasound evidence of focal pancreatic lesion. Consider CT or MRI for further evaluation. 2. Status post cholecystectomy without biliary ductal dilatation. 3. Coarsened echotexture of the liver parenchyma with possible slight nodular contour but no evidence of focal lesion.          MICROBIOLOGY:  BLOOD CX #1  Recent Labs     01/21/22  0317   BC Gram stain performed from blood culture bottle media  Gram positive cocci in clusters  Gram negative rods  *     BLOOD CX #2  Recent Labs     01/21/22  0317   BLOODCULT2 Gram stain performed from blood culture bottle media  Gram positive cocci in clusters  Gram negative rods  * CULTURE, RESPIRATORY   No results for input(s): CULTRESP in the last 72 hours. RESPIRATORY SMEAR  No results for input(s): RESPSMEAR in the last 72 hours. STREP PNEUMONIA AG URINE  No results for input(s): STREPNEUMAGU in the last 72 hours. LEGIONELLA AG URINE  No results for input(s): LEGUR in the last 72 hours. No results for input(s): [address] in the last 72 hours. URINE CULTURE  No results for input(s): LABURIN in the last 72 hours. WOUND ABSCESS  No results for input(s): WNDABS in the last 72 hours. TIP CULTURE  No results for input(s): CXCATHTIP in the last 72 hours. BODY FLUID CULTURE  No results for input(s): BFCS in the last 72 hours. Anaerobic cx  No results for input(s): LABANAE in the last 72 hours. CULTURE SURGICAL  No results for input(s): CXSURG in the last 72 hours. MISC. SENDOUT  No results for input(s): MREF in the last 72 hours. Assessment/Plan  Torsten Bautista is a 78y.o. year old female who presented on 1/21/2022 and is being treated for Sepsis secondary to UTI ([address] . Complete Problem List:    Patient Active Problem List    Diagnosis Date Noted    Elevated transaminase level 01/21/2022    Essential hypertension 09/17/2017    Hypothyroidism 09/17/2017    Sepsis secondary to UTI ([address] 01/21/2022    CKD (chronic kidney disease) stage 3, GFR 30-59 ml/min ([address] 01/21/2022    Pure hypercholesterolemia 10/22/2019    Cognitive dysfunction 10/22/2019    Moderate obesity 02/05/2018    Nonischemic cardiomyopathy ([address] 10/25/2017    Chronic combined systolic and diastolic HF (heart failure) ([address] 10/25/2017    History of TIA (transient ischemic attack)      Plan  · UTI/sepsis:   ? UA noted. Urine cx pending. - checked no culture sent. ? Gentle IVF hydration  ? · MRSA/Pseudomonas Bacteremia  · ID consulted  · Antibiotics changed to vanc/cefepime for now  · Elevated transaminase measurements-H/o choledocholithiasis:   ? H/o cholecystectomy   ? Check lipase - wnl    ?  Right upper quadrant ultrasound - pancreatic duct dilatation - will see if can get MRCP -pending today  · Hypomagnesemia:   ? Replace and monitor  · Weakness/frequent falls:  ? PT/OT  · Continue home medications  · Follow labs  · DVT prophylaxis. · Please see orders for further management and care. ·  for discharge planning  · Discharge plan: TBD pending clinical improvement     Electronically signed by Matt Adame MD on 1/23/2022 at 12:14 PM      NOTE:  This report was transcribed using voice recognition software. Every effort was made to ensure accuracy; however, inadvertent computerized transcription errors may be present.

## 2022-01-24 ENCOUNTER — APPOINTMENT (OUTPATIENT)
Dept: ULTRASOUND IMAGING | Age: 82
DRG: 871 | End: 2022-01-24
Payer: MEDICARE

## 2022-01-24 LAB
ALBUMIN SERPL-MCNC: 2.5 G/DL (ref 3.5–5.2)
ALP BLD-CCNC: 171 U/L (ref 35–104)
ALT SERPL-CCNC: 41 U/L (ref 0–32)
AMMONIA: 28.5 UMOL/L (ref 11–51)
ANION GAP SERPL CALCULATED.3IONS-SCNC: 9 MMOL/L (ref 7–16)
AST SERPL-CCNC: 31 U/L (ref 0–31)
BASOPHILS ABSOLUTE: 0.07 E9/L (ref 0–0.2)
BASOPHILS RELATIVE PERCENT: 0.8 % (ref 0–2)
BILIRUB SERPL-MCNC: 0.3 MG/DL (ref 0–1.2)
BUN BLDV-MCNC: 20 MG/DL (ref 6–23)
CALCIUM SERPL-MCNC: 8.9 MG/DL (ref 8.6–10.2)
CEA: 4.4 NG/ML (ref 0–5.2)
CHLORIDE BLD-SCNC: 104 MMOL/L (ref 98–107)
CO2: 23 MMOL/L (ref 22–29)
CREAT SERPL-MCNC: 1.4 MG/DL (ref 0.5–1)
EOSINOPHILS ABSOLUTE: 0.31 E9/L (ref 0.05–0.5)
EOSINOPHILS RELATIVE PERCENT: 3.7 % (ref 0–6)
FERRITIN: 69 NG/ML
GFR AFRICAN AMERICAN: 43
GFR NON-AFRICAN AMERICAN: 36 ML/MIN/1.73
GLUCOSE BLD-MCNC: 106 MG/DL (ref 74–99)
HCT VFR BLD CALC: 29.3 % (ref 34–48)
HEMOGLOBIN: 8.8 G/DL (ref 11.5–15.5)
IMMATURE GRANULOCYTES #: 0.41 E9/L
IMMATURE GRANULOCYTES %: 4.9 % (ref 0–5)
IRON SATURATION: 20 % (ref 15–50)
IRON: 54 MCG/DL (ref 37–145)
LYMPHOCYTES ABSOLUTE: 2.78 E9/L (ref 1.5–4)
LYMPHOCYTES RELATIVE PERCENT: 33.1 % (ref 20–42)
MAGNESIUM: 1.7 MG/DL (ref 1.6–2.6)
MCH RBC QN AUTO: 24 PG (ref 26–35)
MCHC RBC AUTO-ENTMCNC: 30 % (ref 32–34.5)
MCV RBC AUTO: 80.1 FL (ref 80–99.9)
MONOCYTES ABSOLUTE: 1.2 E9/L (ref 0.1–0.95)
MONOCYTES RELATIVE PERCENT: 14.3 % (ref 2–12)
NEUTROPHILS ABSOLUTE: 3.63 E9/L (ref 1.8–7.3)
NEUTROPHILS RELATIVE PERCENT: 43.2 % (ref 43–80)
PDW BLD-RTO: 20 FL (ref 11.5–15)
PLATELET # BLD: 180 E9/L (ref 130–450)
PMV BLD AUTO: 10.6 FL (ref 7–12)
POTASSIUM SERPL-SCNC: 4 MMOL/L (ref 3.5–5)
RBC # BLD: 3.66 E12/L (ref 3.5–5.5)
SODIUM BLD-SCNC: 136 MMOL/L (ref 132–146)
T4 TOTAL: 7.7 MCG/DL (ref 4.5–11.7)
TOTAL IRON BINDING CAPACITY: 269 MCG/DL (ref 250–450)
TOTAL PROTEIN: 5.4 G/DL (ref 6.4–8.3)
TSH SERPL DL<=0.05 MIU/L-ACNC: 8.21 UIU/ML (ref 0.27–4.2)
WBC # BLD: 8.4 E9/L (ref 4.5–11.5)

## 2022-01-24 PROCEDURE — 86038 ANTINUCLEAR ANTIBODIES: CPT

## 2022-01-24 PROCEDURE — 84443 ASSAY THYROID STIM HORMONE: CPT

## 2022-01-24 PROCEDURE — 76770 US EXAM ABDO BACK WALL COMP: CPT

## 2022-01-24 PROCEDURE — 97530 THERAPEUTIC ACTIVITIES: CPT

## 2022-01-24 PROCEDURE — 97161 PT EVAL LOW COMPLEX 20 MIN: CPT

## 2022-01-24 PROCEDURE — 6370000000 HC RX 637 (ALT 250 FOR IP): Performed by: INTERNAL MEDICINE

## 2022-01-24 PROCEDURE — 36415 COLL VENOUS BLD VENIPUNCTURE: CPT

## 2022-01-24 PROCEDURE — 2580000003 HC RX 258: Performed by: INTERNAL MEDICINE

## 2022-01-24 PROCEDURE — 80053 COMPREHEN METABOLIC PANEL: CPT

## 2022-01-24 PROCEDURE — 83735 ASSAY OF MAGNESIUM: CPT

## 2022-01-24 PROCEDURE — 6360000002 HC RX W HCPCS: Performed by: INTERNAL MEDICINE

## 2022-01-24 PROCEDURE — 2060000000 HC ICU INTERMEDIATE R&B

## 2022-01-24 PROCEDURE — 97165 OT EVAL LOW COMPLEX 30 MIN: CPT

## 2022-01-24 PROCEDURE — 85025 COMPLETE CBC W/AUTO DIFF WBC: CPT

## 2022-01-24 PROCEDURE — 82105 ALPHA-FETOPROTEIN SERUM: CPT

## 2022-01-24 PROCEDURE — 82378 CARCINOEMBRYONIC ANTIGEN: CPT

## 2022-01-24 PROCEDURE — 84436 ASSAY OF TOTAL THYROXINE: CPT

## 2022-01-24 PROCEDURE — 87088 URINE BACTERIA CULTURE: CPT

## 2022-01-24 PROCEDURE — 82784 ASSAY IGA/IGD/IGG/IGM EACH: CPT

## 2022-01-24 PROCEDURE — 86255 FLUORESCENT ANTIBODY SCREEN: CPT

## 2022-01-24 PROCEDURE — 2580000003 HC RX 258: Performed by: SPECIALIST

## 2022-01-24 PROCEDURE — 82787 IGG 1 2 3 OR 4 EACH: CPT

## 2022-01-24 PROCEDURE — 83550 IRON BINDING TEST: CPT

## 2022-01-24 PROCEDURE — 83540 ASSAY OF IRON: CPT

## 2022-01-24 PROCEDURE — 82140 ASSAY OF AMMONIA: CPT

## 2022-01-24 PROCEDURE — 51701 INSERT BLADDER CATHETER: CPT

## 2022-01-24 PROCEDURE — 80074 ACUTE HEPATITIS PANEL: CPT

## 2022-01-24 PROCEDURE — 6360000002 HC RX W HCPCS: Performed by: SPECIALIST

## 2022-01-24 PROCEDURE — 82728 ASSAY OF FERRITIN: CPT

## 2022-01-24 PROCEDURE — 82103 ALPHA-1-ANTITRYPSIN TOTAL: CPT

## 2022-01-24 RX ADMIN — ATORVASTATIN CALCIUM 40 MG: 40 TABLET, FILM COATED ORAL at 21:27

## 2022-01-24 RX ADMIN — BUPROPION HYDROCHLORIDE 150 MG: 150 TABLET, EXTENDED RELEASE ORAL at 08:44

## 2022-01-24 RX ADMIN — Medication 250 MG: at 08:44

## 2022-01-24 RX ADMIN — LOSARTAN POTASSIUM 50 MG: 50 TABLET, FILM COATED ORAL at 08:44

## 2022-01-24 RX ADMIN — CEFEPIME 2000 MG: 2 INJECTION, POWDER, FOR SOLUTION INTRAMUSCULAR; INTRAVENOUS at 19:05

## 2022-01-24 RX ADMIN — POTASSIUM CHLORIDE 20 MEQ: 20 TABLET, EXTENDED RELEASE ORAL at 08:47

## 2022-01-24 RX ADMIN — Medication 10 ML: at 08:54

## 2022-01-24 RX ADMIN — PANTOPRAZOLE SODIUM 40 MG: 40 TABLET, DELAYED RELEASE ORAL at 08:44

## 2022-01-24 RX ADMIN — SUCRALFATE 1 G: 1 TABLET ORAL at 05:38

## 2022-01-24 RX ADMIN — LEVOTHYROXINE SODIUM 75 MCG: 75 TABLET ORAL at 05:38

## 2022-01-24 RX ADMIN — VANCOMYCIN HYDROCHLORIDE 1000 MG: 1 INJECTION, POWDER, LYOPHILIZED, FOR SOLUTION INTRAVENOUS at 11:57

## 2022-01-24 RX ADMIN — SUCRALFATE 1 G: 1 TABLET ORAL at 23:27

## 2022-01-24 RX ADMIN — SUCRALFATE 1 G: 1 TABLET ORAL at 11:57

## 2022-01-24 RX ADMIN — ASPIRIN 81 MG CHEWABLE TABLET 81 MG: 81 TABLET CHEWABLE at 08:44

## 2022-01-24 RX ADMIN — CEFEPIME HYDROCHLORIDE 2000 MG: 2 INJECTION, POWDER, FOR SOLUTION INTRAVENOUS at 05:38

## 2022-01-24 RX ADMIN — BUSPIRONE HYDROCHLORIDE 15 MG: 5 TABLET ORAL at 21:27

## 2022-01-24 RX ADMIN — TRAMADOL HYDROCHLORIDE 100 MG: 50 TABLET, COATED ORAL at 08:43

## 2022-01-24 RX ADMIN — BUPROPION HYDROCHLORIDE 150 MG: 150 TABLET, EXTENDED RELEASE ORAL at 21:27

## 2022-01-24 RX ADMIN — TOPIRAMATE 25 MG: 25 TABLET, FILM COATED ORAL at 08:44

## 2022-01-24 RX ADMIN — ENOXAPARIN SODIUM 40 MG: 100 INJECTION SUBCUTANEOUS at 08:45

## 2022-01-24 RX ADMIN — DONEPEZIL HYDROCHLORIDE 5 MG: 5 TABLET, FILM COATED ORAL at 21:27

## 2022-01-24 RX ADMIN — BUSPIRONE HYDROCHLORIDE 15 MG: 5 TABLET ORAL at 08:43

## 2022-01-24 RX ADMIN — METOPROLOL SUCCINATE 25 MG: 25 TABLET, FILM COATED, EXTENDED RELEASE ORAL at 08:44

## 2022-01-24 RX ADMIN — TRAMADOL HYDROCHLORIDE 100 MG: 50 TABLET, COATED ORAL at 21:27

## 2022-01-24 RX ADMIN — CONJUGATED ESTROGENS: 0.62 CREAM VAGINAL at 19:06

## 2022-01-24 RX ADMIN — TRAMADOL HYDROCHLORIDE 100 MG: 50 TABLET, COATED ORAL at 15:13

## 2022-01-24 ASSESSMENT — PAIN - FUNCTIONAL ASSESSMENT: PAIN_FUNCTIONAL_ASSESSMENT: PREVENTS OR INTERFERES SOME ACTIVE ACTIVITIES AND ADLS

## 2022-01-24 ASSESSMENT — PAIN DESCRIPTION - LOCATION: LOCATION: BACK

## 2022-01-24 ASSESSMENT — PAIN SCALES - GENERAL
PAINLEVEL_OUTOF10: 0
PAINLEVEL_OUTOF10: 8
PAINLEVEL_OUTOF10: 8
PAINLEVEL_OUTOF10: 7
PAINLEVEL_OUTOF10: 0

## 2022-01-24 ASSESSMENT — PAIN DESCRIPTION - PAIN TYPE: TYPE: CHRONIC PAIN

## 2022-01-24 ASSESSMENT — PAIN DESCRIPTION - ORIENTATION: ORIENTATION: LOWER

## 2022-01-24 ASSESSMENT — PAIN DESCRIPTION - DESCRIPTORS: DESCRIPTORS: DISCOMFORT

## 2022-01-24 ASSESSMENT — PAIN DESCRIPTION - ONSET: ONSET: ON-GOING

## 2022-01-24 ASSESSMENT — PAIN DESCRIPTION - PROGRESSION: CLINICAL_PROGRESSION: NOT CHANGED

## 2022-01-24 ASSESSMENT — PAIN DESCRIPTION - FREQUENCY: FREQUENCY: CONTINUOUS

## 2022-01-24 NOTE — PROGRESS NOTES
Occupational Therapy  OCCUPATIONAL THERAPY INITIAL EVALUATION      Date:2022  Patient Name: Andrew Sheridan  MRN: [id number]  : 1940  Room: 6568/4595    OCCUPATIONAL THERAPY INITIAL EVALUATION    [place], [place]         Date:2022                                                  Patient Name: Andrew Sheridan    MRN: [id number]    : 1940    Room: 8379/6301-      Evaluating OT: Speedy Desouza OTR/L   [id number]      Referring Provider:Vinod Godfrey DO    Specific Provider Orders/Date:OT eval and treat 2022      Diagnosis:  Sepsis secondary to UTI ([facility] [address] [A41.9, N39.0]  Acute respiratory failure with hypoxemia ([facility] [address] [J96.01]     Pertinent Medical History: CHF, TIA, anxiety     Precautions:  Fall Risk,      Assessment of current deficits    [x] Functional mobility  [x]ADLs  [x] Strength               []Cognition    [x] Functional transfers   [x] IADLs         [x] Safety Awareness   [x]Endurance    [] Fine Coordination              [x] Balance      [] Vision/perception   []Sensation     []Gross Motor Coordination  [] ROM  [] Delirium                   [] Motor Control     OT PLAN OF CARE   OT POC based on physician orders, patient diagnosis and results of clinical assessment    Frequency/Duration  2-4 days/wk for 2 weeks PRN   Specific OT Treatment Interventions to include:   ADL retraining/adapted techniques and AE recommendations to increase functional independence within precautions                    Energy conservation techniques to improve tolerance for selfcare routine   Functional transfer/mobility training/DME recommendations for increased independence, safety and fall prevention         Patient/family education to increase safety and functional independence             Environmental modifications for safe mobility and completion of ADLs                             Therapeutic activity to improve functional performance during ADLs. Therapeutic exercise to improve tolerance and functional strength for ADLs    Balance retraining/tolerance tasks for facilitation of postural control with dynamic challenges during ADLs . Positioning to improve functional independence      Recommended Adaptive Equipment: TBD     Home Living: Pt lives daughter,1 st floor set-up. Bathroom setup: tub/shhower, grab bar and seat    Equipment owned: walker, cane    Prior Level of Function: assist  with ADLs , assist with IADLs; ambulated with cane    Pain Level: no pain;   Cognition: A&O: pleasant and following commands.   Impulsive    Memory:  Fair+   Sequencing:  Fair+   Problem solving:  Fair+   Judgement/safety:  Fair+     Functional Assessment:  AM-PAC Daily Activity Raw Score: 17/24   Initial Eval Status  Date: 1/24/22 Treatment Status  Date: STGs = LTGs  Time frame: 10-14 days   Feeding Independent      Grooming SBA/set-up   Supervision    UB Dressing Min A  Supervision    LB Dressing Mod A   PTA:   Daughter assisting    Min A   Bathing Mod  A  Min A   Toileting SBA   Mod I    Bed Mobility  Up in chair upon entry   Supervision    Functional Transfers SBA  Sit-stand from chair, commode  Cueing for tech  (tendency to plop down)   Mod I    Functional Mobility CGA, cane to bathroom   Unsteadiness noted, flexed posture   CGA/SBA,w/walker  Returning from bathroom   (cueing for walker tech)  Supervision  with good tolerance    Balance Sitting:     Static:  Independent     Dynamic:SBA   Standing: SBA  Supervision    Activity Tolerance Fair + with light activity   No SOB noted   Good  with ADL activity    Visual/  Perceptual Glasses: yes                 Hand Dominance right    AROM (PROM) Strength Additional Info:    RUE  WFL WFL good  and wfl FMC/dexterity noted during ADL tasks       LUE WFL WFL good  and wfl FMC/dexterity noted during ADL tasks       Hearing: [facility]   Sensation:  No c/o numbness or tingling  Tone: WFL   Edema: none observed     Comments: Upon arrival patient sitting in chair . At end of session, patient returned to chair  with call light and phone within reach, all lines and tubes intact. Daughter present in room      Overall patient demonstrated  decreased independence and safety during completion of ADL/functional transfer/mobility tasks. Pt would benefit from continued skilled OT to increase safety and independence with completion of ADL/IADL tasks for functional independence and quality of life. Rehab Potential: good for established goals     Patient / Family Goal: return home      Patient and/or family were instructed on functional diagnosis, prognosis/goals and OT plan of care. Demonstrated  Good  understanding. Eval Complexity: Low    Time In: 1600  Time Out: 1620      Min Units   OT Eval Low 97165 x  1   OT Eval Medium 00979      OT Eval High 76153      OT Re-Eval [id number]       Therapeutic Ex 33493       Therapeutic Activities 67807       ADL/Self Care 96662       Orthotic Management 19708       Manual 06262     Neuro Re-Ed 00079       Non-Billable Time          Evaluation Time additionally includes thorough review of current medical information, gathering information on past medical history/social history and prior level of function, interpretation of standardized testing/informal observation of tasks, assessment of data and development of plan of care and goals.             Nelson Sanchez  OTR/L  OT [id number]

## 2022-01-24 NOTE — PROGRESS NOTES
RN spoke with patient on Sunday 1/23 and today about needing to get up to the toilet to urinate instead of going in her depends. It was explained several times. She has now stated she will call for assistance. BSC and walker provided.

## 2022-01-24 NOTE — PROGRESS NOTES
Internal Medicine Progress Note    Admission date: 1/21/2022  Primary care physician: Anna Machado MD    Subjective  Gonzalez Norman was seen and examined at bedside today. No family present during my examination. She is doing well today   No new complaints   Resting comfortably   NAD   All of her questions were answered to her satisfaction   She has no abdominal pain, fevers chills sweats nausea vomiting or constipation or diarrhea     Review of Systems  There are no new complaints of chest pain, shortness of breath, abdominal pain, nausea, vomiting, diarrhea, constipation, headaches, fevers, chills, lightheadedness, dizziness, calf pain.     Hospital Medications  Current Facility-Administered Medications   Medication Dose Route Frequency Provider Last Rate Last Admin    vancomycin 1000 mg IVPB in 250 mL D5W addavial  1,000 mg IntraVENous Q24H Anna Machado MD   Stopped at 01/23/22 1308    cefepime (MAXIPIME) 2000 mg IVPB minibag  2,000 mg IntraVENous Q24H Anna Machado MD 12.5 mL/hr at 01/24/22 0538 2,000 mg at 01/24/22 0538    sodium chloride flush 0.9 % injection 10 mL  10 mL IntraVENous 2 times per day Vinod E Volino, DO   10 mL at 01/23/22 1523    sodium chloride flush 0.9 % injection 10 mL  10 mL IntraVENous PRN Vinod E Volino, DO        0.9 % sodium chloride infusion  25 mL IntraVENous PRN Vinod E Volino, DO        potassium chloride (KLOR-CON M) extended release tablet 40 mEq  40 mEq Oral PRN Vinod E Volino, DO        Or    potassium bicarb-citric acid (EFFER-K) effervescent tablet 40 mEq  40 mEq Oral PRN Vinod E Volino, DO        Or    potassium chloride 10 mEq/100 mL IVPB (Peripheral Line)  10 mEq IntraVENous PRN Vinod E Volino, DO        enoxaparin (LOVENOX) injection 40 mg  40 mg SubCUTAneous Daily Vinod E Volino, DO   40 mg at 01/23/22 0938    ondansetron (ZOFRAN-ODT) disintegrating tablet 4 mg  4 mg Oral Q8H PRN Vinod E Volino, DO        Or    ondansetron (ZOFRAN) injection 4 mg  4 mg 01/23/22 1523    0.9 % sodium chloride infusion   IntraVENous Continuous Ginette Leach MD 50 mL/hr at 01/23/22 1537 New Bag at 01/23/22 1537       PRN Medications  sodium chloride flush, sodium chloride, potassium chloride **OR** potassium alternative oral replacement **OR** potassium chloride, ondansetron **OR** ondansetron, senna, acetaminophen **OR** acetaminophen    Objective  Most Recent Recorded Vitals  BP (!) 157/70   Pulse 59   Temp 97.9 °F (36.6 °C) (Axillary)   Resp 16   Ht 5' 5\" (1.651 m)   Wt 166 lb 8 oz (75.5 kg)   SpO2 96%   BMI 27.71 kg/m²   I/O last 3 completed shifts: In: 3897.6 [I.V.:2902.6; IV Piggyback:995]  Out: -   No intake/output data recorded. Physical Exam:  General: AAO to person, place, time, and purpose, NAD, no labored breathing  Eyes: conjunctivae/corneas clear, sclera non icteric. Skin: color, texture, and turgor normal, no rashes or lesions. Lungs: clear to auscultation bilaterally, no retractions or use of accessory muscles, no vocal fremitus, no rhonchi, no crackle, no rales  Heart: regular rate and regular rhythm, no murmur  Abdomen: soft, non-tender; bowel sounds normal; no masses,  no organomegaly  Extremities: atraumatic, no cyanosis, no edema  Neurologic: cranial nerves 2-12 grossly intact, no slurred speech. Most Recent Labs  Lab Results   Component Value Date    WBC 8.4 01/24/2022    HGB 8.8 (L) 01/24/2022    HCT 29.3 (L) 01/24/2022     01/24/2022     01/24/2022    K 4.0 01/24/2022     01/24/2022    CREATININE 1.4 (H) 01/24/2022    BUN 20 01/24/2022    CO2 23 01/24/2022    GLUCOSE 106 (H) 01/24/2022    ALT 41 (H) 01/24/2022    AST 31 01/24/2022    INR 1.2 09/15/2020       *All labs in electric medical record were reviewed.  Please see electronic chart for a more comprehensive set of labs    XR CHEST PORTABLE    Result Date: 1/21/2022  EXAMINATION: ONE XRAY VIEW OF THE CHEST 1/21/2022 4:00 am COMPARISON: 09/18/2017 HISTORY: ORDERING SYSTEM PROVIDED HISTORY: eval for pneumonia TECHNOLOGIST PROVIDED HISTORY: Reason for exam:->eval for pneumonia FINDINGS: There is no cardiomegaly. There is no pulmonary vascular congestion or infiltrate. There is minimal subsegmental atelectasis in lower left lung. There is no pleural effusion. There is no pneumothorax. There is no significant abnormality seen. CTA PULMONARY W CONTRAST    Result Date: 1/21/2022  EXAMINATION: CTA OF THE CHEST 1/21/2022 4:48 am TECHNIQUE: CTA of the chest was performed after the administration of intravenous contrast.  Multiplanar reformatted images are provided for review. MIP images are provided for review. Dose modulation, iterative reconstruction, and/or weight based adjustment of the mA/kV was utilized to reduce the radiation dose to as low as reasonably achievable. COMPARISON: CT abdomen of 09/12/2020 HISTORY: ORDERING SYSTEM PROVIDED HISTORY: eval for PE TECHNOLOGIST PROVIDED HISTORY: Reason for exam:->eval for PE Decision Support Exception - unselect if not a suspected or confirmed emergency medical condition->Emergency Medical Condition (MA) FINDINGS: Pulmonary Arteries: Pulmonary arteries are adequately opacified for evaluation. No evidence of intraluminal filling defect to suggest pulmonary embolism. Main pulmonary artery is normal in caliber. Mediastinum: No evidence of mediastinal lymphadenopathy. The heart and pericardium demonstrate no acute abnormality. There is no aortic dissection seen. There is calcified plaque involving thoracic aorta. Lungs/pleura: There is mild dependent atelectasis at the lung bases. There is no acute infiltrate seen. There is no pleural effusion or pneumothorax. Upper Abdomen: Limited images of the upper abdomen demonstrate no acute abnormality. Status post interval cholecystectomy. Bones: There is a T6 marked compression deformity which is of unknown age. Compression deformity of L1 is unchanged.   There is a healing fracture involving the left 12th rib. 1. No evidence for pulmonary embolism. 2. T6 marked compression deformity is of unknown age. Compression deformity of L1 is unchanged from 2020. 3. There is a healing fracture involving the left 12th rib. RECOMMENDATIONS: Unavailable     US ABDOMEN LIMITED Specify organ? LIVER, GALLBLADDER    Result Date: 1/21/2022  EXAMINATION: RIGHT UPPER QUADRANT ULTRASOUND 1/21/2022 9:14 am COMPARISON: None. HISTORY: ORDERING SYSTEM PROVIDED HISTORY: elevated transaminase measurements TECHNOLOGIST PROVIDED HISTORY: Reason for exam:->elevated transaminase measurements Specify organ?->LIVER Specify organ?->GALLBLADDER What reading provider will be dictating this exam?->CRC FINDINGS: LIVER:  There is slight coarsened echotexture of the liver without evidence of focal lesion. There is subtle suggestion of nodular contour. BILIARY SYSTEM:  Status post cholecystectomy. Common bile duct measures approximately 3.2 mm in diameter RIGHT KIDNEY: The right kidney measures approximately 7.6 cm in length without evidence of hydronephrosis. There is cortical thinning and lobular contour of the right kidney. There is a cyst measuring approximately 1.3 cm in size. There is 2nd probable cyst measuring approximately 1 cm in size. PANCREAS:  Visualized portions of the pancreas are unremarkable. There appears to be mild dilatation of the pancreatic duct. 1. There appears to be dilatation of the pancreatic duct without ultrasound evidence of focal pancreatic lesion. Consider CT or MRI for further evaluation. 2. Status post cholecystectomy without biliary ductal dilatation. 3. Coarsened echotexture of the liver parenchyma with possible slight nodular contour but no evidence of focal lesion. MICROBIOLOGY:  BLOOD CX #1  No results for input(s): BC in the last 72 hours. BLOOD CX #2  No results for input(s): Vilinda Milch in the last 72 hours.    CULTURE, RESPIRATORY   No results for input(s): CULTRESP in the last 72 hours. RESPIRATORY SMEAR  No results for input(s): RESPSMEAR in the last 72 hours. STREP PNEUMONIA AG URINE  No results for input(s): STREPNEUMAGU in the last 72 hours. LEGIONELLA AG URINE  No results for input(s): LEGUR in the last 72 hours. No results for input(s): [facility] in the last 72 hours. URINE CULTURE  No results for input(s): LABURIN in the last 72 hours. WOUND ABSCESS  No results for input(s): WNDABS in the last 72 hours. TIP CULTURE  No results for input(s): CXCATHTIP in the last 72 hours. BODY FLUID CULTURE  No results for input(s): BFCS in the last 72 hours. Anaerobic cx  No results for input(s): LABANAE in the last 72 hours. CULTURE SURGICAL  No results for input(s): CXSURG in the last 72 hours. MISC. SENDOUT  No results for input(s): MREF in the last 72 hours. Assessment/Plan  Phillip Oro is a 78y.o. year old female who presented on 1/21/2022 and is being treated for Sepsis secondary to UTI ([address] . Complete Problem List:    Patient Active Problem List    Diagnosis Date Noted    Elevated transaminase level 01/21/2022    Essential hypertension 09/17/2017    Hypothyroidism 09/17/2017    Sepsis secondary to UTI ([address] 01/21/2022    CKD (chronic kidney disease) stage 3, GFR 30-59 ml/min ([address] 01/21/2022    Pure hypercholesterolemia 10/22/2019    Cognitive dysfunction 10/22/2019    Moderate obesity 02/05/2018    Nonischemic cardiomyopathy ([address] 10/25/2017    Chronic combined systolic and diastolic HF (heart failure) ([address] 10/25/2017    History of TIA (transient ischemic attack)      Plan  · UTI/sepsis:   ? UA noted. Urine cx collecgted 1/24/22  ? Gentle IVF hydration  · MRSA/Pseudomonas Bacteremia  · ID consulted  · Antibiotics per them   · Follow cultures   · Elevated transaminase measurements-H/o choledocholithiasis:   ? H/o cholecystectomy   ? Check lipase - wnl    ? Right upper quadrant ultrasound - pancreatic duct dilatation   ? MRCP same   ?  Gi has evaluated the patient today, agree with monitoring clinically for now   · DANIEL on CKD   · US neg for hydro   · Monitor BMP daily   · Continue gentle IVFs   · Hypomagnesemia:   ? Replace and monitor  · Weakness/frequent falls:  ? PT/OT    · Continue home medications  · Follow labs  · DVT prophylaxis. · Please see orders for further management and care. ·  for discharge planning  · Discharge plan: TBD pending clinical improvement     Electronically signed by Laurent Larson MD on 1/24/2022 at 8:05 AM    NOTE:  This report was transcribed using voice recognition software. Every effort was made to ensure accuracy; however, inadvertent computerized transcription errors may be present.

## 2022-01-24 NOTE — PROGRESS NOTES
Notified of Elizabeth Brock NP with Dr. Maria L Overton of new consult.   Tyra Mercer RN NM  11:13 AM

## 2022-01-24 NOTE — CONSULTS
Gastroenterology Consult Note   CRISTIAN Marie NP-C with Mary Kam M.D. Consult Note        Date of Service: 1/24/2022  Reason for Consult: moderate pancreatic duct dilation   Requesting Physician: Dr. Citlaly Sandoval: Shortness of breath, confusion, fatigue and fever    History Obtained From: Patient, EMR    HISTORY OF PRESENT ILLNESS:       Jose Ponce is a 78 y.o. female with significant past medical history of choledocholithiasis, cholecystectomy 9/16/2020, thyroid disease, hypertension, CHF, CKD, hyperlipidemia and TIA admitted via ED for Shortness of breath, confusion, fatigue and fever. Pt reports she was brought in by EMS with complaints of confusion, fatigue and fever at home, she reports her oxygen was reportedly in the 46s. Unknown blood pressure. She denies abdominal pain, nausea or vomiting. Tolerating diet at home. She reports she was given antibiotics from her PCP thought to have UTI at that time. She reports \"I was on antibiotics twice and prednisone\", unable to recall name or when completed. She reports bowel movements are every couple days denies melena or hematochezia. Denies heartburn, fever, unintentional weight loss or hematemesis. Denies Tylenol or NSAID use. Discussed with daughter Wilfrid Earl via telephone. Pt brought to ER thought to be dehydrated not eating or drinking starting on Thursday. Was on antibiotics for sinus infection as well as prednisone that was finished 2 days prior to admission. No prior colonoscopy. States she was to have one however never did it due to not being cleaned out and never had another one. EGD approx 5+ years ago told inflammation near esophagus and left side of stomach and was started on sulcrafate. Admission labs alk phos 224, ALT 53, AST 57, bilirubin 0.8, WBC 13.8, hemoglobin 10.3.  US abdomen-1. There appears to be dilatation of the pancreatic duct without ultrasound evidence of focal pancreatic lesion.   Consider CT or MRI for further evaluation. 2. Status post cholecystectomy without biliary ductal dilatation. 3. Coarsened echotexture of the liver parenchyma with possible slight nodular contour but no evidence of focal lesion. MRI abdomen without contrast MRCP- 1. Mild to moderate pancreatic duct dilation is most prominent near the ampulla and may be a sequela of moderate parenchymal atrophy. No obvious obstructing pancreatic or ampullary lesion is identified, and there is only minimal concomitant intrahepatic and extrahepatic biliary dilation that is likely related to cholecystectomy and/or age. 2. Ill-defined signal abnormality in hepatic segment 5 and possibly adjacent segment 6 with an inflammatory appearance, potentially for focal acute hepatitis given elevated liver enzymes with focal cholangitis an additional consideration. Of note, this is incompletely evaluated due to lack of intravenous contrast administration. 3. Mild anasarca and trace bilateral pleural effusions potentially due to volume overload. Consultation for  moderate pancreatic duct dilation . Pt is known to Dr. Katey Velez, last seen with ERCP. ERCP with papillotomy, stone extraction, and stent placement 9/15/2020-choledocholithiasis, ERCP with papillotomy and 4 stones extraction and Wallstent placement. ERCP with stent removal and stone extraction on 10/21/2020-old stent was removed, for calcified stones were removed via balloon sweeping. Excellent drainage obtained. Balloon cholangiography was of unremarkable currently, pt reports no abdominal pain, nausea or vomiting. Tolerated diet. Labs today alk phos 171, ALT 41, AST 31, lipase 10, WBC 8.4, hemoglobin 8.8. Past Medical History:        Diagnosis Date    Anxiety     Cardiomyopathy ([facility] [address]     CHF (congestive heart failure) ([facility] [facility]     follows with Dr Lizbet Mcdaniel yearly, patient denies any cardiac symptoms presently.     CKD (chronic kidney disease) stage 3, GFR 30-59 ml/min (HCC)     Cognitive dysfunction  Depression     History of TIA (transient ischemic attack)     TIAs ~2002 noted on brain imaging done because pt was having imbalance    Hyperlipidemia     Hypertension     Thyroid disease      Past Surgical History:        Procedure Laterality Date    CHOLECYSTECTOMY, LAPAROSCOPIC N/A 9/16/2020    LAPAROSCOPIC CHOLECYSTECTOMY performed by Daisy Dillard MD at [facility] N/A 9/15/2020    ERCP STENT INSERTION with BALLOON SWEEPING and PAPILLOTOMY performed by Amber Voss MD at [address] ERCP N/A 10/21/2020    ERCP STENT REMOVAL performed by Amber Voss MD at [address] [facility]       Current Medications:    Current Facility-Administered Medications: vancomycin 1000 mg IVPB in 250 mL D5W addavial, 1,000 mg, IntraVENous, Q24H  [COMPLETED] cefepime (MAXIPIME) 2000 mg IVPB minibag, 2,000 mg, IntraVENous, Once **FOLLOWED BY** cefepime (MAXIPIME) 2000 mg IVPB minibag, 2,000 mg, IntraVENous, Q24H  sodium chloride flush 0.9 % injection 10 mL, 10 mL, IntraVENous, 2 times per day  sodium chloride flush 0.9 % injection 10 mL, 10 mL, IntraVENous, PRN  0.9 % sodium chloride infusion, 25 mL, IntraVENous, PRN  potassium chloride (KLOR-CON M) extended release tablet 40 mEq, 40 mEq, Oral, PRN **OR** potassium bicarb-citric acid (EFFER-K) effervescent tablet 40 mEq, 40 mEq, Oral, PRN **OR** potassium chloride 10 mEq/100 mL IVPB (Peripheral Line), 10 mEq, IntraVENous, PRN  enoxaparin (LOVENOX) injection 40 mg, 40 mg, SubCUTAneous, Daily  ondansetron (ZOFRAN-ODT) disintegrating tablet 4 mg, 4 mg, Oral, Q8H PRN **OR** ondansetron (ZOFRAN) injection 4 mg, 4 mg, IntraVENous, Q6H PRN  senna (SENOKOT) tablet 8.6 mg, 1 tablet, Oral, Daily PRN  acetaminophen (TYLENOL) tablet 650 mg, 650 mg, Oral, Q6H PRN **OR** acetaminophen (TYLENOL) suppository 650 mg, 650 mg, Rectal, Q6H PRN  traMADol (ULTRAM) tablet 100 mg, 100 mg, Oral, TID  aspirin chewable tablet 81 mg, 81 mg, Oral, Daily  Calcium Carb-Cholecalciferol 250-125 MG-UNIT TABS 250 mg, 1 tablet, Oral, Daily  losartan (COZAAR) tablet 50 mg, 50 mg, Oral, Daily  levothyroxine (SYNTHROID) tablet 75 mcg, 75 mcg, Oral, Daily  pantoprazole (PROTONIX) tablet 40 mg, 40 mg, Oral, Daily  atorvastatin (LIPITOR) tablet 40 mg, 40 mg, Oral, Nightly  topiramate (TOPAMAX) tablet 25 mg, 25 mg, Oral, Daily  metoprolol succinate (TOPROL XL) extended release tablet 25 mg, 25 mg, Oral, Daily  buPROPion (WELLBUTRIN XL) extended release tablet 150 mg, 150 mg, Oral, BID  busPIRone (BUSPAR) tablet 15 mg, 15 mg, Oral, BID  donepezil (ARICEPT) tablet 5 mg, 5 mg, Oral, Nightly  conjugated estrogens (PREMARIN) vaginal cream, , Vaginal, Once per day on Mon Wed Fri  sucralfate (CARAFATE) tablet 1 g, 1 g, Oral, 4 times per day  potassium chloride (KLOR-CON M) extended release tablet 20 mEq, 20 mEq, Oral, Daily  0.9 % sodium chloride infusion, , IntraVENous, Continuous    Allergies:  Macrobid [nitrofurantoin monohyd macro], Macrobid [nitrofurantoin], Ciprofloxacin, Iodine, and Penicillins    Social History:    Tobacco:  Pt quit 12 years ago; prior 1/2 PPD since the age of 10  Alcohol:  Pt reports  denies  Illicit Drugs: Pt denies     Family History:   Father- D/ prostate/ lung/ spinal CA  Mother- D/ old age  Brother- D/ Crohn's  Daughter- A/H  2 sons- A/H    REVIEW OF SYSTEMS:    Aside from what was mentioned in the PMH and HPI, essentially unremarkable, all others negative.     PHYSICAL EXAM:      Vitals:    BP (!) 154/70   Pulse 68   Temp 97 °F (36.1 °C) (Oral)   Resp 18   Ht 5' 5\" (1.651 m)   Wt 166 lb 8 oz (75.5 kg)   SpO2 98%   BMI 27.71 kg/m²       CONSTITUTIONAL:  awake, alert, cooperative, no apparent distress, and appears stated age  EYES:  pupils equal, round and reactive to light, sclera anicteric and conjunctiva normal  ENT:  normocephalic, oral pharynx with moist mucous membranes  NECK:  supple HEMATOLOGIC/LYMPHATICS:  no cervical lymphadenopathy and no supraclavicular lymphadenopathy  LUNGS:  No increased work of breathing, good air exchange, clear to auscultation bilaterally.   CARDIOVASCULAR:  Normal apical impulse, regular rate and rhythm, no murmur noted; 2+ pulses; trace edema  ABDOMEN:  normal bowel sounds, soft, non-distended, non-tender, no masses palpated, no hepatosplenomegally  MUSCULOSKELETAL:  full range of motion noted  motor strength is 5 out of 5 all extremities bilaterally  NEUROLOGIC:  Mental Status Exam:  Level of Alertness:   awake  Orientation:   person, place, time, intermittent confused   Motor Exam:  Motor exam is symmetrical 5 out of 5 all extremities bilaterally  SKIN:  normal skin color, texture, turgor    DATA:    CBC with Differential:    Lab Results   Component Value Date    WBC 8.4 01/24/2022    RBC 3.66 01/24/2022    HGB 8.8 01/24/2022    HCT 29.3 01/24/2022     01/24/2022    MCV 80.1 01/24/2022    MCH 24.0 01/24/2022    MCHC 30.0 01/24/2022    RDW 20.0 01/24/2022    NRBC 0.0 09/18/2017    BLASTSPCT 0.9 09/18/2017    LYMPHOPCT 33.1 01/24/2022    MONOPCT 14.3 01/24/2022    BASOPCT 0.8 01/24/2022    ATYLYMREL 2.6 09/18/2017    MONOSABS 1.20 01/24/2022    LYMPHSABS 2.78 01/24/2022    EOSABS 0.31 01/24/2022    BASOSABS 0.07 01/24/2022     CMP:    Lab Results   Component Value Date     01/24/2022    K 4.0 01/24/2022    K 4.4 01/23/2022     01/24/2022    CO2 23 01/24/2022    BUN 20 01/24/2022    CREATININE 1.4 01/24/2022    GFRAA 43 01/24/2022    LABGLOM 36 01/24/2022    GLUCOSE 106 01/24/2022    PROT 5.4 01/24/2022    LABALBU 2.5 01/24/2022    CALCIUM 8.9 01/24/2022    BILITOT 0.3 01/24/2022    ALKPHOS 171 01/24/2022    AST 31 01/24/2022    ALT 41 01/24/2022     Hepatic Function Panel:    Lab Results   Component Value Date    ALKPHOS 171 01/24/2022    ALT 41 01/24/2022    AST 31 01/24/2022    PROT 5.4 01/24/2022    BILITOT 0.3 01/24/2022    BILIDIR 0.4 01/21/2022    IBILI 0.4 01/21/2022    LABALBU 2.5 01/24/2022     PT/INR:    Lab Results   Component Value Date    PROTIME 13.5 09/15/2020    INR 1.2 09/15/2020     PTT:    Lab Results   Component Value Date    APTT 31.5 09/15/2020   [APTT}  Last 3 Troponin:    Lab Results   Component Value Date    TROPONINI <0.01 09/12/2020    TROPONINI 0.32 09/17/2017    TROPONINI 0.61 09/17/2017     FERRITIN:    Lab Results   Component Value Date    FERRITIN 85 01/21/2022         XR CHEST PORTABLE    Result Date: 1/21/2022  EXAMINATION: ONE XRAY VIEW OF THE CHEST 1/21/2022 4:00 am COMPARISON: 09/18/2017 HISTORY: ORDERING SYSTEM PROVIDED HISTORY: eval for pneumonia TECHNOLOGIST PROVIDED HISTORY: Reason for exam:->eval for pneumonia FINDINGS: There is no cardiomegaly. There is no pulmonary vascular congestion or infiltrate. There is minimal subsegmental atelectasis in lower left lung. There is no pleural effusion. There is no pneumothorax. There is no significant abnormality seen. MRI ABDOMEN WO CONTRAST MRCP    Result Date: 1/24/2022  EXAMINATION: MRI OF THE ABDOMEN WITHOUT CONTRAST AND MRCP 1/23/2022 1:51 pm TECHNIQUE: Multiplanar multisequence MRI of the abdomen was performed without the administration of intravenous contrast. COMPARISON: Limited abdominal sonogram 01/21/2022, chest CTA 01/21/2022, ERCPs 10/21/2020 and 09/15/2020, abdomen and pelvis CTs 09/12/2020 and 09/07/2009 HISTORY: ORDERING SYSTEM PROVIDED HISTORY: pancreatic dilatation TECHNOLOGIST PROVIDED HISTORY: Reason for exam:->pancreatic dilatation FINDINGS: Lack of intravenous contrast administration, partially limiting evaluation of the soft tissues and vasculature. Patient motion in some areas, partially limiting evaluation of those areas. LOWER CHEST:  Trace bilateral pleural effusions with minimal passive atelectasis in the overlying bilateral lower lobes. Normal heart size. No pericardial effusion.  LIVER:  Ill-defined and somewhat wedge-shaped T2 intermediate signal predominantly in segment 5 with possible involvement of adjacent segment 6. GALLBLADDER/BILE DUCTS:  Surgically absent gallbladder. Minimal intrahepatic and extrahepatic biliary dilation with normal tapering of the distal common bile duct. No obvious ductal filling defects nor strictures. PANCREAS:  Typical duct configuration. Moderate dilation the downstream duct near the ampulla with mild upstream dilation in the neck and body. No obvious ductal filling defects nor strictures. Moderate parenchymal atrophy. SPLEEN:  Normal appearance. ADRENAL GLANDS:  Normal appearance. KIDNEYS:  Mild bilateral atrophy with multifocal parenchymal scarring. Simple parenchymal cysts in the lateral right interpolar region measuring up to 1.7 cm (Bosniak category 1). Left peripelvic cyst. GI/BOWEL:  Normal course and caliber of the stomach, small bowel, and colon without obstruction. PELVIS:  Partial visualization of the urinary bladder dome with no evident abnormality. PERITONEUM/RETROPERITONEUM:  No abdominal lymphadenopathy. No free intraperitoneal fluid. VESSELS:  Limited evaluation due to technique. SOFT TISSUES/BONES:  Mild subcutaneous edema. No suspicious marrow lesions. Multilevel degenerative disc disease. Mild thoracolumbar levoscoliosis. 1. Mild to moderate pancreatic duct dilation is most prominent near the ampulla and may be a sequela of moderate parenchymal atrophy. No obvious obstructing pancreatic or ampullary lesion is identified, and there is only minimal concomitant intrahepatic and extrahepatic biliary dilation that is likely related to cholecystectomy and/or age. 2. Ill-defined signal abnormality in hepatic segment 5 and possibly adjacent segment 6 with an inflammatory appearance, potentially for focal acute hepatitis given elevated liver enzymes with focal cholangitis an additional consideration.   Of note, this is incompletely evaluated due to lack of intravenous contrast administration. 3. Mild anasarca and trace bilateral pleural effusions potentially due to volume overload. CTA PULMONARY W CONTRAST    Result Date: 1/21/2022  EXAMINATION: CTA OF THE CHEST 1/21/2022 4:48 am TECHNIQUE: CTA of the chest was performed after the administration of intravenous contrast.  Multiplanar reformatted images are provided for review. MIP images are provided for review. Dose modulation, iterative reconstruction, and/or weight based adjustment of the mA/kV was utilized to reduce the radiation dose to as low as reasonably achievable. COMPARISON: CT abdomen of 09/12/2020 HISTORY: ORDERING SYSTEM PROVIDED HISTORY: eval for PE TECHNOLOGIST PROVIDED HISTORY: Reason for exam:->eval for PE Decision Support Exception - unselect if not a suspected or confirmed emergency medical condition->Emergency Medical Condition (MA) FINDINGS: Pulmonary Arteries: Pulmonary arteries are adequately opacified for evaluation. No evidence of intraluminal filling defect to suggest pulmonary embolism. Main pulmonary artery is normal in caliber. Mediastinum: No evidence of mediastinal lymphadenopathy. The heart and pericardium demonstrate no acute abnormality. There is no aortic dissection seen. There is calcified plaque involving thoracic aorta. Lungs/pleura: There is mild dependent atelectasis at the lung bases. There is no acute infiltrate seen. There is no pleural effusion or pneumothorax. Upper Abdomen: Limited images of the upper abdomen demonstrate no acute abnormality. Status post interval cholecystectomy. Bones: There is a T6 marked compression deformity which is of unknown age. Compression deformity of L1 is unchanged. There is a healing fracture involving the left 12th rib. 1. No evidence for pulmonary embolism. 2. T6 marked compression deformity is of unknown age. Compression deformity of L1 is unchanged from 2020. 3. There is a healing fracture involving the left 12th rib.  RECOMMENDATIONS: Unavailable     US ABDOMEN LIMITED Specify organ? LIVER, GALLBLADDER    Result Date: 1/21/2022  EXAMINATION: RIGHT UPPER QUADRANT ULTRASOUND 1/21/2022 9:14 am COMPARISON: None. HISTORY: ORDERING SYSTEM PROVIDED HISTORY: elevated transaminase measurements TECHNOLOGIST PROVIDED HISTORY: Reason for exam:->elevated transaminase measurements Specify organ?->LIVER Specify organ?->GALLBLADDER What reading provider will be dictating this exam?->CRC FINDINGS: LIVER:  There is slight coarsened echotexture of the liver without evidence of focal lesion. There is subtle suggestion of nodular contour. BILIARY SYSTEM:  Status post cholecystectomy. Common bile duct measures approximately 3.2 mm in diameter RIGHT KIDNEY: The right kidney measures approximately 7.6 cm in length without evidence of hydronephrosis. There is cortical thinning and lobular contour of the right kidney. There is a cyst measuring approximately 1.3 cm in size. There is 2nd probable cyst measuring approximately 1 cm in size. PANCREAS:  Visualized portions of the pancreas are unremarkable. There appears to be mild dilatation of the pancreatic duct. 1. There appears to be dilatation of the pancreatic duct without ultrasound evidence of focal pancreatic lesion. Consider CT or MRI for further evaluation. 2. Status post cholecystectomy without biliary ductal dilatation. 3. Coarsened echotexture of the liver parenchyma with possible slight nodular contour but no evidence of focal lesion. US RETROPERITONEAL COMPLETE    Result Date: 1/24/2022  EXAMINATION: RETROPERITONEAL ULTRASOUND OF THE KIDNEYS AND URINARY BLADDER 1/24/2022 COMPARISON: The previous abdominal ultrasound performed 01/21/2022. Correlation is made with MRI of the abdomen performed 01/23/2022. HISTORY: ORDERING SYSTEM PROVIDED HISTORY: freddy Patterson TECHNOLOGIST PROVIDED HISTORY: Reason for exam:->freddy manley What reading provider will be dictating this exam?->CRC FINDINGS: Kidneys:  The right kidney measures 7.0 in length and the left kidney measures 7.1 in length. Kidneys demonstrate normal cortical echogenicity. The kidneys are again lobulated. No evidence of hydronephrosis or intrarenal stones. Again seen is a mid to lower pole right renal cortical cyst, measuring 1.4 x 1.3 x 1.3 cm in size. A 1.0 cm cyst is noted involving the midpole of the left kidney and may be parapelvic. No perinephric fluid is identified bilaterally. Bladder: Unremarkable appearance of the visualized bladder. No significant post void both urinary bladder jets are not visualized. 1.  Atrophic kidneys again noted, when compared to the previous abdominal ultrasound performed 01/21/2022 and MRI of the abdomen performed 01/23/2022. 2.  Bilateral renal cysts. IMPRESSION:  · Pancreatic duct dilatation  · Cholecystectomy 9/16/2020  · History choledocholithiasis s/p ERCP 9/15/20  · Fatty liver disease  · Elevated LFT's- improving, medications/MRI reviewed  · MRSA/Pseudomonas bacteremia- ID following     RECOMMENDATIONS:    · Serology ordered to include tumor markers   · Diet as tolerated  · Antibiotocs per ID  · IVF per PCP  · Will need endoscopic work up, to be arranged and done as outpatient  · Will continue to monitor with imaging as an outpatient, if further clarification needed will proceed with EUS as outpatient   · Supportive care  · Continue to monitor     Note: This report was completed utilizing computer voice recognition software. Every effort has been made to ensure accuracy, however; inadvertent computerized transcription errors may be present. Thank you very much for your consultation. We will follow closely with you. Discussed with Dr. Mahesh Ramirez developed by Dr. Rebeka Bay, APRN, NP-C 1/24/2022 3:41 PM for Dr. Lares Friday FLAGS, IE WITHOUT WEIGHT LOSS, JAUNDICE. ABNORMAL MRCP OF PANCREAS.  I FEEL COMFORTABLE ENOUGH TO OBSERVE FOR NOW WILL CHECK 19-9. IF FURTHER CLARIFICATION IS NEEDED WILL CHECK EUS.

## 2022-01-24 NOTE — CARE COORDINATION
Social Work/Discharge Planning:  Social Work consult noted. Met with patient and completed initial assessment. Explained Social Work role and discussed transition of care/discharge planning. Patient lives with her daughter in a two story house with no steps to enter. PTA she uses a cane. She has a walker and shower chair at home. She denies any history with [facility] or snf. Patient PCP is Dr. Rene Jones and [facility]. Provided patient information on home meal programs per her request.  She plans to return home at discharge and denies any further home care needs at this time. Will continue to follow and assist with discharge planning.  Electronically signed by ALLAN Clark on 1/24/2022 at 2:24 PM

## 2022-01-24 NOTE — CONSULTS
[address] [place] [address] [facility]  [facility]    Consultation Note     Admit Date: 1/21/2022  2:35 AM    Reason for Consult:   Pseudomonas septicemia    Attending Physician:  Hema Natarajan MD     Chief Complaint: dysuria    HISTORY OF PRESENT ILLNESS:   The patient is a 78 y.o. woman known to the Infectious Diseases service. The patient is admitted through the emergency room by EMS with complaints of confusion fatigue fever and dysuria. Her primary care physician thought that perhaps she had a UTI and she was given antimicrobials. But only had 1 or 2 doses. She was given steroids as well. Labs in the emergency room showed that she was somewhat prerenal BUN 22 and creatinine 1.4 with a sodium 128. Her white count was 13,000 and the UA had 5-10 white cells per high-power field. Blood cultures urine cultures were obtained and the blood is growing Pseudomonas aeruginosa from 1/21/2022. Currently she is on cefepime. Since admission she had a CT of the chest with contrast that was negative. Ultrasound of the abdomen showed cholecystectomy and dilatation of pancreatic duct. An MRI of the abdomen  Which did not reveal anything more than a CAT scan. Past Medical History:        Diagnosis Date    Anxiety     [facility]omy[facility]     CHF (congestive heart failure) ([facility]     follows with Dr Kevin Ayoub yearly, patient denies any cardiac symptoms presently.     CKD (chronic kidney disease) stage 3, GFR 30-59 ml/min ([facility])     Cognitive dysfunction     Depression     History of TIA (transient ischemic attack)     TIAs ~2002 noted on brain imaging done because pt was having imbalance    Hyperlipidemia     Hypertension     Thyroid disease      Past Surgical History:        Procedure Laterality Date    CHOLECYSTECTOMY, LAPAROSCOPIC N/A 9/16/2020    LAPAROSCOPIC CHOLECYSTECTOMY performed by Viky Workman MD at [place] [address] ERCP N/A 9/15/2020    ERCP STENT INSERTION with BALLOON SWEEPING and PAPILLOTOMY performed by Anahi Salazar MD at [address] [facility] ERCP N/A 10/21/2020    ERCP STENT REMOVAL performed by Anahi Salazar MD at [facility]      TONSILLECTOMY       Current Medications:   Scheduled Meds:   vancomycin  1,000 mg IntraVENous Q24H    cefepime  2,000 mg IntraVENous Q24H    sodium chloride flush  10 mL IntraVENous 2 times per day    enoxaparin  40 mg SubCUTAneous Daily    traMADol  100 mg Oral TID    aspirin  81 mg Oral Daily    Calcium Carb-Cholecalciferol  1 tablet Oral Daily    losartan  50 mg Oral Daily    levothyroxine  75 mcg Oral Daily    pantoprazole  40 mg Oral Daily    atorvastatin  40 mg Oral Nightly    topiramate  25 mg Oral Daily    metoprolol succinate  25 mg Oral Daily    buPROPion  150 mg Oral BID    busPIRone  15 mg Oral BID    donepezil  5 mg Oral Nightly    conjugated estrogens   Vaginal Once per day on Mon Wed Fri    sucralfate  1 g Oral 4 times per day    potassium chloride  20 mEq Oral Daily     Continuous Infusions:   sodium chloride      sodium chloride 50 mL/hr at 01/23/22 1537     PRN Meds:sodium chloride flush, sodium chloride, potassium chloride **OR** potassium alternative oral replacement **OR** potassium chloride, ondansetron **OR** ondansetron, senna, acetaminophen **OR** acetaminophen    Allergies:  Macrobid [nitrofurantoin monohyd macro], Macrobid [nitrofurantoin], Ciprofloxacin, Iodine, and Penicillins    Social History:   Social History     Socioeconomic History    Marital status: Single     Spouse name: None    Number of children: None    Years of education: None    Highest education level: None   Occupational History    None   Tobacco Use    Smoking status: Former Smoker     Packs/day: 1.00     Years: 40.00     Pack years: 40.00     Types: Cigarettes     Quit date: 2007     Years since quitting: 15.0    Smokeless tobacco: Never Used   Vaping Use    Vaping Use: Never used   Substance and Sexual Activity    Alcohol use: Yes     Comment: rare mixed drink;  drinks 1 cup of coffee and pop qd    Drug use: No    Sexual activity: None   Other Topics Concern    None   Social History Narrative    None     Social Determinants of Health     Financial Resource Strain:     Difficulty of Paying Living Expenses: Not on file   Food Insecurity:     Worried About Running Out of Food in the Last Year: Not on file    Luisa of Food in the Last Year: Not on file   Transportation Needs:     Lack of Transportation (Medical): Not on file    Lack of Transportation (Non-Medical): Not on file   Physical Activity:     Days of Exercise per Week: Not on file    Minutes of Exercise per Session: Not on file   Stress:     Feeling of Stress : Not on file   Social Connections:     Frequency of Communication with Friends and Family: Not on file    Frequency of Social Gatherings with Friends and Family: Not on file    Attends Gnosticism Services: Not on file    Active Member of [address] [place] [address] or Organizations: Not on file    Attends Club or Organization Meetings: Not on file    Marital Status: Not on file   Intimate Partner Violence:     Fear of Current or Ex-Partner: Not on file    Emotionally Abused: Not on file    Physically Abused: Not on file    Sexually Abused: Not on file   Housing Stability:     Unable to Pay for Housing in the Last Year: Not on file    Number of [place] in the Last Year: Not on file    Unstable Housing in the Last Year: Not on file         Family History:   History reviewed. No pertinent family history. . Otherwise non-pertinent to the chief complaint. REVIEW OF SYSTEMS:    CONSTITUTIONAL: Positive chills, fevers or night sweats. No loss of weight. EYES:  No double vision or drainage from eyes, ears or throat. HEENT:  No neck stiffness. No dysphagia. No drainage from eyes, ears or throat  RESPIRATORY:  No cough, productive sputum or hemoptysis. CARDIOVASCULAR:  No chest pain, palpitations, orthopnea or dyspnea on exertion. GASTROINTESTINAL:   positive nausea, negative vomiting, diarrhea or constipation or hematochezia   GENITOURINARY: Positive dysuria   INTEGUMENT/BREAST:  No rash or breast masses. HEMATOLOGIC/LYMPHATIC:  No lymphadenopathy or blood dyscrasics. ALLERGIC/IMMUNOLOGIC:  No anaphylaxis. ENDOCRINE:  No polyuria or polydipsia or temperature intolerance. MUSCULOSKELETAL:  No myalgia or arthralgia. Full ROM. NEUROLOGICAL:  No focal motor sensory deficit. BEHAVIOR/PSYCH:  No psychosis. PHYSICAL EXAM:    Vitals:    BP (!) 154/70   Pulse 68   Temp 97 °F (36.1 °C) (Oral)   Resp 18   Ht 5' 5\" (1.651 m)   Wt 166 lb 8 oz (75.5 kg)   SpO2 98%   BMI 27.71 kg/m²   Constitutional: The patient is awake, alert, and oriented. Skin: Warm and dry. No rashes were noted. No jaundice. HEENT: Eyes show round, and reactive pupils. Moist mucous membranes, no ulcerations, no thrush. Neck: Supple to movements. No lymphadenopathy. Chest: No use of accessory muscles to breathe. Symmetrical expansion. Auscultation reveals no wheezing, crackles, or rhonchi. Cardiovascular: S1 and S2 are rhythmic and regular. No murmurs appreciated. Abdomen: Positive bowel sounds to auscultation. Benign to palpation. No masses felt. No hepatosplenomegaly. Genitourinary: Female, flank pain on the right  Extremities: No clubbing, no cyanosis, no edema. Musculoskeletal: Equal and symmetrical  Neurological: No focal  Lines: peripheral      CBC+dif:  Recent Labs     01/22/22  0400 01/22/22  0400 01/23/22  0230 01/23/22  0230 01/24/22  0432   WBC 10.2  --  11.3  --  8.4   HGB 8.8*   < > 8.9*   < > 8.8*   HCT 28.1*   < > 29.1*   < > 29.3*   MCV 78.9*   < > 79.5*   < > 80.1      < > 193   < > 180   NEUTROABS 8.60*   < > 7.49*   < > 3.63    < > = values in this interval not displayed.      Lab Results   Component Value Date    CRP 21.7 (H) 01/21/2022     No results found for: [facility]  No results found for: SEDRATE  Lab Results   Component Value Date    ALT 41 (H) 01/24/2022    AST 31 01/24/2022    ALKPHOS 171 (H) 01/24/2022    BILITOT 0.3 01/24/2022     Lab Results   Component Value Date     01/24/2022    K 4.0 01/24/2022    K 4.4 01/23/2022     01/24/2022    CO2 23 01/24/2022    BUN 20 01/24/2022    CREATININE 1.4 01/24/2022    GFRAA 43 01/24/2022    LABGLOM 36 01/24/2022    GLUCOSE 106 01/24/2022    PROT 5.4 01/24/2022    LABALBU 2.5 01/24/2022    CALCIUM 8.9 01/24/2022    BILITOT 0.3 01/24/2022    ALKPHOS 171 01/24/2022    AST 31 01/24/2022    ALT 41 01/24/2022       Lab Results   Component Value Date    PROTIME 13.5 09/15/2020    INR 1.2 09/15/2020       Lab Results   Component Value Date    TSH 8.210 01/24/2022       Lab Results   Component Value Date    COLORU Yellow 01/21/2022    PHUR 6.0 01/21/2022    WBCUA 5-10 01/21/2022    RBCUA 0-1 01/21/2022    BACTERIA MANY 01/21/2022    CLARITYU Clear 01/21/2022    SPECGRAV 1.025 01/21/2022    LEUKOCYTESUR TRACE 01/21/2022    UROBILINOGEN 2.0 01/21/2022    BILIRUBINUR Negative 01/21/2022    BLOODU SMALL 01/21/2022    GLUCOSEU Negative 01/21/2022       No results found for: UER5CRW, BEART, R5ECKKDS, PHART, THGBART, IOT0YIK, PO2ART, GGJ8ZEG  Radiology:  US RETROPERITONEAL COMPLETE   Final Result   1. Atrophic kidneys again noted, when compared to the previous abdominal   ultrasound performed 01/21/2022 and MRI of the abdomen performed 01/23/2022.      2.  Bilateral renal cysts. MRI ABDOMEN WO CONTRAST MRCP   Final Result   1. Mild to moderate pancreatic duct dilation is most prominent near the   ampulla and may be a sequela of moderate parenchymal atrophy. No obvious   obstructing pancreatic or ampullary lesion is identified, and there is only   minimal concomitant intrahepatic and extrahepatic biliary dilation that is   likely related to cholecystectomy and/or age.    2. Ill-defined signal abnormality daughter  · Baseline ESR, CRP  · Monitor labs  · Will follow with you    Thank you for having us see this patient in consultation. I will be discussing this case with the treating physicians.       Electronically signed by Fermin Ogden MD on 1/24/2022 at 5:55 PM

## 2022-01-24 NOTE — PROGRESS NOTES
Physical Therapy Initial Assessment     Name: Rao Vargas  : 1940  MRN: [id number]      Date of Service: 2022    Evaluating PT:  Kristin Majano, PT, DPT [id number]      Room #:  4003/5472-S  Diagnosis:  Seps[facility] ([facility] [A41.9, N39.0]  Acute respiratory failure with hypoxemia ([facility] [J96.01]  PMHx/PSHx:  Thyroid disease, HTN, TIA, Cognitive dysfunction, Cardiomyopathy, Hyperlipidemia, Anxiety, Depression, CHF, CKD, ERCP, Knee surgery  Procedure/Surgery:  NA  Precautions:  Falls  Equipment Needs:  Has SPC    SUBJECTIVE:    Pt lives with her daughter in a 2 story home with no stairs to enter. Bed is on 1st floor and bath is on 1st floor. Pt ambulated with a SPC PTA. Pt reported her daughter assists with ADLs as needed. Pt is no longer driving. OBJECTIVE:   Initial Evaluation  Date: 2022 Treatment Date:  NA Short Term/ Long Term   Goals   AM-PAC 6 Clicks 55/89     Was pt agreeable to Eval/treatment? Yes      Does pt have pain? Reported no pain. Bed Mobility  Rolling: SBA  Supine to sit: SBA  Sit to supine: SBA  Scooting: SBA  Rolling: Independent  Supine to sit: Independent  Sit to supine: Independent  Scooting: Independent   Transfers Sit to stand: Min A  Stand to sit: Min A  Stand pivot: Mod A  Sit to stand: SBA  Stand to sit: SBA  Stand pivot: SBA   Ambulation    20 feet x 2 with [employer] with Min A  >50 feet with [employer] with SBA   Stair negotiation: ascended and descended  NT  NA   ROM BUE:  WFL  BLE:  WFL     Strength BUE:  4-/5  BLE:  4-/5  Increase strength 1/3 grade.    Balance Sitting EOB:  SBA  Dynamic Standing:  Min/Mod A  Sitting EOB:  Independent  Dynamic Standing:  SBA     Pt is A & O x 3  Sensation:  Pt denies numbness and tingling to extremities  Edema:  None noted    Vitals:  Blood Pressure at rest - Blood Pressure post session -   Heart Rate at rest - Heart Rate post session -   SPO2 at rest - SPO2 post session -     Therapeutic Exercises:  NT    Patient education  Pt educated on purpose of PT assessment, importance of mobility, safety with mobility, transfers, gait, use of AD for safety    Patient response to education:   Pt verbalized understanding Pt demonstrated skill Pt requires further education in this area   Yes  Yes with verbal cues and assist Yes/Reinforcement     ASSESSMENT:    Conditions Requiring Skilled Therapeutic Intervention:     [x]Decreased strength     []Decreased ROM  [x]Decreased functional mobility  [x]Decreased balance   [x]Decreased endurance   [x]Decreased posture  []Decreased sensation  [x]Decreased coordination   []Decreased vision  [x]Decreased safety awareness   []Increased pain       Comments:  Patient cleared by RN and agreeable to treatment. Patient found in semi [place]'s sleeping and awakened easily to name. Patient required increased time and effort with bed mobility but no hands on needed. Patient denied dizziness with positional change. Patient assisted to standing and presented with moderate forward flexed posture and poor walker safety. Patient with slow gait speed and unsteady on her feet. Patient ambulated into the bathroom and assisted on the commode and abandoned with walker prior to reaching the commode and educated on safety. Patient ambulated back to the bedside and again needed max verbal cues for walker safety/approximation with poor carryover. Patient assisted back to seated and then supine with call light and tray table in reach. Treatment:  Patient practiced and was instructed in the following treatment:    · Bed mobility: Verbal/tactile cues for sequencing BUEs/BLEs for safe technique with rolling/supine<>sit task. · Transfer training: Verbal/tactile cues to facilitate proper hand placement, technique and safety during sit to stand task. · Gait training: Verbal and tactile cues to facilitate upright posture and safety as well as provided with physical assistance to complete task.    · Therapeutic exercises: As noted above  · Neuromuscular education: NT    Pt's/ family goals   1. To feel better. Prognosis is good for reaching above PT goals. Patient and or family understand(s) diagnosis, prognosis, and plan of care. Yes     PHYSICAL THERAPY PLAN OF CARE:    PT POC is established based on physician order and patient diagnosis     Referring provider/PT Order:    01/21/22 0730  PT evaluation and treat  Start:  01/21/22 0730,   End:  01/21/22 0730,   ONE TIME,   Standing Count:  1 Occurrences,   R         Vinod Godfrey DO     Diagnosis:  Sepsis secondary to UTI ([facility] [A41.9, N39.0]  Acute respiratory failure with hypoxemia ([facility] [J96.01]  Specific instructions for next treatment:  Subsequent PT sessions with focus on improved safety with walker use, ambulation    Current Treatment Recommendations:     [x] Strengthening to improve independence with functional mobility   [] ROM to improve independence with functional mobility   [x] Balance Training to improve static/dynamic balance and to reduce fall risk  [x] Endurance Training to improve activity tolerance during functional mobility   [x] Transfer Training to improve safety and independence with all functional transfers   [x] Gait Training to improve gait mechanics, endurance and asses need for appropriate assistive device  [] Stair Training in preparation for safe discharge home and/or into the community   [x] Positioning to prevent skin breakdown and contractures  [x] Safety and Education Training   [x] Patient/Caregiver Education   [] HEP  [] Other     PT long term treatment goals are located in above grid    Frequency of treatments: 2-5x/week x 1-2 weeks.     Time in  1300  Time out  1325    Total Treatment Time  15 minutes     Evaluation Time includes thorough review of current medical information, gathering information on past medical history/social history and prior level of function, completion of standardized testing/informal observation of tasks, assessment of data and education on plan of care and goals.     CPT codes:  [x] Low Complexity PT evaluation 31421  [] Moderate Complexity PT evaluation 68083  [] High Complexity PT evaluation 36414  [] PT Re-evaluation 15930  [] Gait training 80138 - minutes  [] Manual therapy 31113 - minutes  [x] Therapeutic activities 03274 15 minutes  [] Therapeutic exercises 21947 - minutes  [] Neuromuscular reeducation 25820 - minutes     Carol Chatman PT, DPT  License [id number]

## 2022-01-25 LAB
ALBUMIN SERPL-MCNC: 2.9 G/DL (ref 3.5–5.2)
ALP BLD-CCNC: 188 U/L (ref 35–104)
ALT SERPL-CCNC: 37 U/L (ref 0–32)
ANION GAP SERPL CALCULATED.3IONS-SCNC: 8 MMOL/L (ref 7–16)
ANISOCYTOSIS: ABNORMAL
ANTI-MITOCHONDRIAL AB, IFA: NEGATIVE
ANTI-NUCLEAR ANTIBODY (ANA): NEGATIVE
AST SERPL-CCNC: 24 U/L (ref 0–31)
BASOPHILS ABSOLUTE: 0 E9/L (ref 0–0.2)
BASOPHILS RELATIVE PERCENT: 0 % (ref 0–2)
BILIRUB SERPL-MCNC: 0.3 MG/DL (ref 0–1.2)
BLOOD CULTURE, ROUTINE: ABNORMAL
BUN BLDV-MCNC: 16 MG/DL (ref 6–23)
CALCIUM SERPL-MCNC: 9.2 MG/DL (ref 8.6–10.2)
CHLORIDE BLD-SCNC: 104 MMOL/L (ref 98–107)
CHOLESTEROL, FASTING: 101 MG/DL (ref 0–199)
CO2: 25 MMOL/L (ref 22–29)
CREAT SERPL-MCNC: 1.4 MG/DL (ref 0.5–1)
CULTURE, BLOOD 2: ABNORMAL
CULTURE, BLOOD 2: ABNORMAL
EOSINOPHILS ABSOLUTE: 0.58 E9/L (ref 0.05–0.5)
EOSINOPHILS RELATIVE PERCENT: 6.1 % (ref 0–6)
GFR AFRICAN AMERICAN: 43
GFR NON-AFRICAN AMERICAN: 36 ML/MIN/1.73
GLUCOSE BLD-MCNC: 87 MG/DL (ref 74–99)
HAV IGM SER IA-ACNC: NORMAL
HCT VFR BLD CALC: 30.9 % (ref 34–48)
HDLC SERPL-MCNC: 34 MG/DL
HEMOGLOBIN: 9.3 G/DL (ref 11.5–15.5)
HEPATITIS B CORE IGM ANTIBODY: NORMAL
HEPATITIS B SURFACE ANTIGEN INTERPRETATION: NORMAL
HEPATITIS C ANTIBODY INTERPRETATION: NORMAL
IGG: 588 MG/DL (ref 700–1600)
IGM: 44 MG/DL (ref 40–230)
LDL CHOLESTEROL CALCULATED: 46 MG/DL (ref 0–99)
LYMPHOCYTES ABSOLUTE: 3.14 E9/L (ref 1.5–4)
LYMPHOCYTES RELATIVE PERCENT: 33 % (ref 20–42)
MCH RBC QN AUTO: 23.9 PG (ref 26–35)
MCHC RBC AUTO-ENTMCNC: 30.1 % (ref 32–34.5)
MCV RBC AUTO: 79.4 FL (ref 80–99.9)
METAMYELOCYTES RELATIVE PERCENT: 3.5 % (ref 0–1)
MONOCYTES ABSOLUTE: 1.04 E9/L (ref 0.1–0.95)
MONOCYTES RELATIVE PERCENT: 11.3 % (ref 2–12)
MYELOCYTE PERCENT: 7.8 % (ref 0–0)
NEUTROPHILS ABSOLUTE: 4.66 E9/L (ref 1.8–7.3)
NEUTROPHILS RELATIVE PERCENT: 37.4 % (ref 43–80)
NUCLEATED RED BLOOD CELLS: 0 /100 WBC
ORGANISM: ABNORMAL
OVALOCYTES: ABNORMAL
PDW BLD-RTO: 20 FL (ref 11.5–15)
PLATELET # BLD: 211 E9/L (ref 130–450)
PMV BLD AUTO: 10.2 FL (ref 7–12)
POIKILOCYTES: ABNORMAL
POLYCHROMASIA: ABNORMAL
POTASSIUM SERPL-SCNC: 4.6 MMOL/L (ref 3.5–5)
PROMYELOCYTES PERCENT: 0.9 % (ref 0–0)
RBC # BLD: 3.89 E12/L (ref 3.5–5.5)
SCHISTOCYTES: ABNORMAL
SMOOTH MUSCLE ANTIBODY: NEGATIVE
SODIUM BLD-SCNC: 137 MMOL/L (ref 132–146)
TOTAL PROTEIN: 5.7 G/DL (ref 6.4–8.3)
TRIGLYCERIDE, FASTING: 107 MG/DL (ref 0–149)
VANCOMYCIN RANDOM: 6.2 MCG/ML (ref 5–40)
VLDLC SERPL CALC-MCNC: 21 MG/DL
WBC # BLD: 9.5 E9/L (ref 4.5–11.5)

## 2022-01-25 PROCEDURE — 80061 LIPID PANEL: CPT

## 2022-01-25 PROCEDURE — 85025 COMPLETE CBC W/AUTO DIFF WBC: CPT

## 2022-01-25 PROCEDURE — 36415 COLL VENOUS BLD VENIPUNCTURE: CPT

## 2022-01-25 PROCEDURE — 2580000003 HC RX 258: Performed by: INTERNAL MEDICINE

## 2022-01-25 PROCEDURE — 6360000002 HC RX W HCPCS: Performed by: INTERNAL MEDICINE

## 2022-01-25 PROCEDURE — 86301 IMMUNOASSAY TUMOR CA 19-9: CPT

## 2022-01-25 PROCEDURE — 6370000000 HC RX 637 (ALT 250 FOR IP): Performed by: INTERNAL MEDICINE

## 2022-01-25 PROCEDURE — 6360000002 HC RX W HCPCS: Performed by: SPECIALIST

## 2022-01-25 PROCEDURE — 80202 ASSAY OF VANCOMYCIN: CPT

## 2022-01-25 PROCEDURE — 2060000000 HC ICU INTERMEDIATE R&B

## 2022-01-25 PROCEDURE — 80053 COMPREHEN METABOLIC PANEL: CPT

## 2022-01-25 PROCEDURE — 2580000003 HC RX 258: Performed by: SPECIALIST

## 2022-01-25 RX ADMIN — ATORVASTATIN CALCIUM 40 MG: 40 TABLET, FILM COATED ORAL at 20:49

## 2022-01-25 RX ADMIN — LOSARTAN POTASSIUM 50 MG: 50 TABLET, FILM COATED ORAL at 09:13

## 2022-01-25 RX ADMIN — METOPROLOL SUCCINATE 25 MG: 25 TABLET, FILM COATED, EXTENDED RELEASE ORAL at 12:13

## 2022-01-25 RX ADMIN — ASPIRIN 81 MG CHEWABLE TABLET 81 MG: 81 TABLET CHEWABLE at 09:13

## 2022-01-25 RX ADMIN — SUCRALFATE 1 G: 1 TABLET ORAL at 12:14

## 2022-01-25 RX ADMIN — SUCRALFATE 1 G: 1 TABLET ORAL at 23:45

## 2022-01-25 RX ADMIN — SODIUM CHLORIDE: 9 INJECTION, SOLUTION INTRAVENOUS at 05:57

## 2022-01-25 RX ADMIN — DONEPEZIL HYDROCHLORIDE 5 MG: 5 TABLET, FILM COATED ORAL at 20:49

## 2022-01-25 RX ADMIN — CEFEPIME 2000 MG: 2 INJECTION, POWDER, FOR SOLUTION INTRAMUSCULAR; INTRAVENOUS at 23:46

## 2022-01-25 RX ADMIN — ENOXAPARIN SODIUM 30 MG: 30 INJECTION SUBCUTANEOUS at 09:13

## 2022-01-25 RX ADMIN — TOPIRAMATE 25 MG: 25 TABLET, FILM COATED ORAL at 09:13

## 2022-01-25 RX ADMIN — Medication 250 MG: at 09:13

## 2022-01-25 RX ADMIN — Medication 10 ML: at 20:49

## 2022-01-25 RX ADMIN — VANCOMYCIN HYDROCHLORIDE 1000 MG: 1 INJECTION, POWDER, LYOPHILIZED, FOR SOLUTION INTRAVENOUS at 11:28

## 2022-01-25 RX ADMIN — TRAMADOL HYDROCHLORIDE 100 MG: 50 TABLET, COATED ORAL at 09:12

## 2022-01-25 RX ADMIN — LEVOTHYROXINE SODIUM 75 MCG: 75 TABLET ORAL at 05:57

## 2022-01-25 RX ADMIN — SUCRALFATE 1 G: 1 TABLET ORAL at 05:57

## 2022-01-25 RX ADMIN — BUPROPION HYDROCHLORIDE 150 MG: 150 TABLET, EXTENDED RELEASE ORAL at 09:13

## 2022-01-25 RX ADMIN — PANTOPRAZOLE SODIUM 40 MG: 40 TABLET, DELAYED RELEASE ORAL at 09:13

## 2022-01-25 RX ADMIN — BUSPIRONE HYDROCHLORIDE 15 MG: 5 TABLET ORAL at 21:35

## 2022-01-25 RX ADMIN — SUCRALFATE 1 G: 1 TABLET ORAL at 17:42

## 2022-01-25 RX ADMIN — CEFEPIME 2000 MG: 2 INJECTION, POWDER, FOR SOLUTION INTRAMUSCULAR; INTRAVENOUS at 12:14

## 2022-01-25 RX ADMIN — BUSPIRONE HYDROCHLORIDE 15 MG: 5 TABLET ORAL at 09:13

## 2022-01-25 RX ADMIN — TRAMADOL HYDROCHLORIDE 100 MG: 50 TABLET, COATED ORAL at 20:49

## 2022-01-25 RX ADMIN — POTASSIUM CHLORIDE 20 MEQ: 20 TABLET, EXTENDED RELEASE ORAL at 09:12

## 2022-01-25 RX ADMIN — BUPROPION HYDROCHLORIDE 150 MG: 150 TABLET, EXTENDED RELEASE ORAL at 21:35

## 2022-01-25 RX ADMIN — SENNOSIDES 8.6 MG: 8.6 TABLET, COATED ORAL at 18:11

## 2022-01-25 RX ADMIN — TRAMADOL HYDROCHLORIDE 100 MG: 50 TABLET, COATED ORAL at 16:11

## 2022-01-25 ASSESSMENT — PAIN SCALES - GENERAL
PAINLEVEL_OUTOF10: 0

## 2022-01-25 NOTE — PROGRESS NOTES
Pharmacy Consultation Note  (Antibiotic Dosing and Monitoring)        Note vancomycin discontinued. Clinical pharmacy will sign-off. Please re-consult if we can be of further assistance.     Thanks for this consult,  Joelle Hammans, [facility], PharmD, BCPS  1/25/2022  4:03 PM

## 2022-01-25 NOTE — PROGRESS NOTES
[address] [place] [address] [facility]  [facility]  Progress Note    SUBJECTIVE:  Chief Complaint   Patient presents with    Fatigue    Altered Mental Status    Extremity Weakness    Fever     Patient is tolerating medications. No reported adverse drug reactions. No nausea, vomiting, diarrhea. Having some abdominal discomfort from lovenox injection  No fevers or chills       Review of systems:  As stated above in the chief complaint, otherwise negative. Medications:  Scheduled Meds:   enoxaparin  30 mg SubCUTAneous Daily    cefepime  2,000 mg IntraVENous Q12H    vancomycin  1,000 mg IntraVENous Q24H    sodium chloride flush  10 mL IntraVENous 2 times per day    traMADol  100 mg Oral TID    aspirin  81 mg Oral Daily    Calcium Carb-Cholecalciferol  1 tablet Oral Daily    losartan  50 mg Oral Daily    levothyroxine  75 mcg Oral Daily    pantoprazole  40 mg Oral Daily    atorvastatin  40 mg Oral Nightly    topiramate  25 mg Oral Daily    metoprolol succinate  25 mg Oral Daily    buPROPion  150 mg Oral BID    busPIRone  15 mg Oral BID    donepezil  5 mg Oral Nightly    conjugated estrogens   Vaginal Once per day on     sucralfate  1 g Oral 4 times per day    potassium chloride  20 mEq Oral Daily     Continuous Infusions:   sodium chloride      sodium chloride 50 mL/hr at 22 0557     PRN Meds:sodium chloride flush, sodium chloride, potassium chloride **OR** potassium alternative oral replacement **OR** potassium chloride, ondansetron **OR** ondansetron, senna, acetaminophen **OR** acetaminophen    OBJECTIVE:  BP (!) 146/75   Pulse 65   Temp 97.9 °F (36.6 °C) (Axillary)   Resp 16   Ht 5' 5\" (1.651 m)   Wt 169 lb 1.6 oz (76.7 kg)   SpO2 97%   BMI 28.14 kg/m²   Temp  Av.5 °F (36.4 °C)  Min: 97 °F (36.1 °C)  Max: 97.9 °F (36.6 °C)  Constitutional: The patient is awake, alert, and oriented. Sitting up in the chair, talkative  Skin: Warm and dry. No rashes were noted.    HEENT: Round and reactive pupils. Moist mucous membranes. No ulcerations or thrush. Neck: Supple to movements. Chest: No use of accessory muscles to breathe. Symmetrical expansion. No wheezing, crackles or rhonchi. Cardiovascular: S1 and S2 are rhythmic and regular. No murmurs appreciated. Abdomen: Positive bowel sounds to auscultation. Benign to palpation. No masses felt. + right CVA tenderness  Extremities: No edema.   Lines: peripheral    Laboratory and Tests Review:  Lab Results   Component Value Date    WBC 9.5 01/25/2022    WBC 8.4 01/24/2022    WBC 11.3 01/23/2022    HGB 9.3 (L) 01/25/2022    HCT 30.9 (L) 01/25/2022    MCV 79.4 (L) 01/25/2022     01/25/2022     Lab Results   Component Value Date    NEUTROABS 4.66 01/25/2022    NEUTROABS 3.63 01/24/2022    NEUTROABS 7.49 (H) 01/23/2022     No results found for: [facility]  Lab Results   Component Value Date    ALT 37 (H) 01/25/2022    AST 24 01/25/2022    ALKPHOS 188 (H) 01/25/2022    BILITOT 0.3 01/25/2022     Lab Results   Component Value Date     01/25/2022    K 4.6 01/25/2022    K 4.4 01/23/2022     01/25/2022    CO2 25 01/25/2022    BUN 16 01/25/2022    CREATININE 1.4 01/25/2022    CREATININE 1.4 01/24/2022    CREATININE 1.3 01/23/2022    GFRAA 43 01/25/2022    LABGLOM 36 01/25/2022    GLUCOSE 87 01/25/2022    PROT 5.7 01/25/2022    LABALBU 2.9 01/25/2022    CALCIUM 9.2 01/25/2022    BILITOT 0.3 01/25/2022    ALKPHOS 188 01/25/2022    AST 24 01/25/2022    ALT 37 01/25/2022     Lab Results   Component Value Date    CRP 21.7 (H) 01/21/2022     No results found for: [address]  Radiology:  Noted     Microbiology:   Blood cultures 1/21/2022: Pseudomonas aeruginosa, Staphylococcus species  Urine culture 1/24/2021: growth not present      ASSESSMENT:  Pseudomonal septicemia  Right flank pain associated with dysuria and polyuria most likely complicated UTI  Leukocytosis from the above, resolved    PLAN:  Continue Cefepime; will attempt to try Levaquin for eventual discharge on oral  Check final cultures  Labs reviewed - IgG 588 - may benefit from IVIG infusion     CRISTIAN Galvan - CNP  1:48 PM  1/25/2022    Pt seen and examined. Above discussed agree with advanced practice nurse. Labs, cultures, and radiographs reviewed. Face to Face encounter occurred. Changes made as necessary.      Nancy Hamlin MD

## 2022-01-25 NOTE — CARE COORDINATION
Social Work/Discharge Planning:  Received notification patient and her daughter agreeable to rehab. Called patient daughter Osmany Born and reviewed snf choice list.  Osmany Born states she prefers [facility]. Referral made to [facility] with [facility] and facility will review patient information. Will continue to follow. Electronically signed by ALLAN Regan on 2022 at 1:30 PM    Addendum:  [facility] with [facility] states facility can accept patient at discharge. Facility will need to know length of IV antibiotic if needed at discharge. Patient will need a negative covid result day of discharge. Patient will need a pre-cert but no need to wait for insurance authorization. Called patient daughter Osmany Velazquez and provided her with an update. Will continue to follow.  Electronically signed by ALLAN Regan on 2022 at 2:17 PM

## 2022-01-25 NOTE — CARE COORDINATION
Social Work/Discharge Planning:  Met with patient and reviewed therapy score indicating need for rehab. Patient states she will need to discuss with her daughter Neva Guillen. Patient agreeable to this worker calling her daughter. Johnna Haley and provided her with an update. Informed her of choices for snf versus home with home health care. Neva Guillen will discuss with her mother and notify this worker of plan. Will continue to follow.   Electronically signed by ALLAN Baxter on 1/25/2022 at 11:12 AM

## 2022-01-25 NOTE — PROGRESS NOTES
Internal Medicine Progress Note    Admission date: 1/21/2022  Primary care physician: Jhony Grimaldo MD    Malini Reynoso was seen and examined at bedside today. No family present during my examination. Doing very well   Tolerating abx therapy   Spoke with pt and daughter on phone   Agreeable to NIC   She is quite weak and will benefit greatly   Discussed with CM and SW   All questions answered to their satisfaction     Review of Systems  There are no new complaints of chest pain, shortness of breath, abdominal pain, nausea, vomiting, diarrhea, constipation, headaches, fevers, chills, lightheadedness, dizziness, calf pain.     Hospital Medications  Current Facility-Administered Medications   Medication Dose Route Frequency Provider Last Rate Last Admin    enoxaparin (LOVENOX) injection 30 mg  30 mg SubCUTAneous Daily Vinod Godfrey DO        cefepime (MAXIPIME) 2000 mg IVPB minibag  2,000 mg IntraVENous Q12H Alireza Drake MD 12.5 mL/hr at 01/25/22 0557 2,000 mg at 01/25/22 0557    vancomycin 1000 mg IVPB in 250 mL D5W addavial  1,000 mg IntraVENous Q24H Jhony Grimaldo MD   Stopped at 01/24/22 1341    sodium chloride flush 0.9 % injection 10 mL  10 mL IntraVENous 2 times per day Vinod Godfrey, DO   10 mL at 01/24/22 0854    sodium chloride flush 0.9 % injection 10 mL  10 mL IntraVENous PRN Vinod Godfrey, DO        0.9 % sodium chloride infusion  25 mL IntraVENous PRN Vinod Godfrey, DO        potassium chloride (KLOR-CON M) extended release tablet 40 mEq  40 mEq Oral PRN Vinod Godfrey, DO        Or    potassium bicarb-citric acid (EFFER-K) effervescent tablet 40 mEq  40 mEq Oral PRN Vinod Godfrey, DO        Or    potassium chloride 10 mEq/100 mL IVPB (Peripheral Line)  10 mEq IntraVENous PRN Vinod Godfrey, DO        ondansetron (ZOFRAN-ODT) disintegrating tablet 4 mg  4 mg Oral Q8H PRN Vinod Godfrey,         Or    ondansetron (ZOFRAN) injection 4 mg  4 mg IntraVENous Q6H PRN Vinod SWAN Merissaino, DO        senna (SENOKOT) tablet 8.6 mg  1 tablet Oral Daily PRN Vinod Godfrey, DO        acetaminophen (TYLENOL) tablet 650 mg  650 mg Oral Q6H PRN Vinod Godfrey DO        Or    acetaminophen (TYLENOL) suppository 650 mg  650 mg Rectal Q6H PRN Vinod Godfrey, DO        traMADol (ULTRAM) tablet 100 mg  100 mg Oral TID Vinod Godfrey, DO   100 mg at 01/24/22 2127    aspirin chewable tablet 81 mg  81 mg Oral Daily Vinod Godfrey, DO   81 mg at 01/24/22 0844    Calcium Carb-Cholecalciferol 250-125 MG-UNIT TABS 250 mg  1 tablet Oral Daily Vinod Godfrey DO   250 mg at 01/24/22 0844    losartan (COZAAR) tablet 50 mg  50 mg Oral Daily Vinod Godfrey, DO   50 mg at 01/24/22 0844    levothyroxine (SYNTHROID) tablet 75 mcg  75 mcg Oral Daily Vinod Godfrey DO   75 mcg at 01/25/22 0557    pantoprazole (PROTONIX) tablet 40 mg  40 mg Oral Daily Vinod Godfrey, DO   40 mg at 01/24/22 0844    atorvastatin (LIPITOR) tablet 40 mg  40 mg Oral Nightly Vinod Godfrey, DO   40 mg at 01/24/22 2127    topiramate (TOPAMAX) tablet 25 mg  25 mg Oral Daily Vinod Godfrey, DO   25 mg at 01/24/22 0844    metoprolol succinate (TOPROL XL) extended release tablet 25 mg  25 mg Oral Daily Vinod Godfrey DO   25 mg at 01/24/22 0844    buPROPion (WELLBUTRIN XL) extended release tablet 150 mg  150 mg Oral BID Vinod Godfrey, DO   150 mg at 01/24/22 2127    busPIRone (BUSPAR) tablet 15 mg  15 mg Oral BID Vinod Godfrey, DO   15 mg at 01/24/22 2127    donepezil (ARICEPT) tablet 5 mg  5 mg Oral Nightly Vinod Godfrey, DO   5 mg at 01/24/22 2127    conjugated estrogens (PREMARIN) vaginal cream   Vaginal Once per day on Mon Wed Fri Floreen Snowolivier Cravenino, DO   Given at 01/24/22 1906    sucralfate (CARAFATE) tablet 1 g  1 g Oral 4 times per day Vinod Godfrey DO   1 g at 01/25/22 0557    potassium chloride (KLOR-CON M) extended release tablet 20 mEq  20 mEq Oral Daily Vinod Godfrey DO   20 mEq at 01/24/22 0847    0.9 % sodium chloride infusion   IntraVENous Continuous Katina Palma MD 50 mL/hr at 01/25/22 0557 New Bag at 01/25/22 0557       PRN Medications  sodium chloride flush, sodium chloride, potassium chloride **OR** potassium alternative oral replacement **OR** potassium chloride, ondansetron **OR** ondansetron, senna, acetaminophen **OR** acetaminophen    Objective  Most Recent Recorded Vitals  BP (!) 162/74   Pulse 81   Temp 97.5 °F (36.4 °C) (Oral)   Resp 18   Ht 5' 5\" (1.651 m)   Wt 169 lb 1.6 oz (76.7 kg)   SpO2 100%   BMI 28.14 kg/m²   I/O last 3 completed shifts: In: 2000.1 [P.O.:450; I.V.:1300.1; IV Piggyback:250]  Out: 50 [Urine:50]  No intake/output data recorded. Physical Exam:  General: AAO to person, place, time, and purpose, NAD, no labored breathing  Eyes: conjunctivae/corneas clear, sclera non icteric. Skin: color, texture, and turgor normal, no rashes or lesions. Lungs: clear to auscultation bilaterally, no retractions or use of accessory muscles, no vocal fremitus, no rhonchi, no crackle, no rales  Heart: regular rate and regular rhythm, no murmur  Abdomen: soft, non-tender; bowel sounds normal; no masses,  no organomegaly  Extremities: atraumatic, no cyanosis, no edema  Neurologic: cranial nerves 2-12 grossly intact, no slurred speech. Most Recent Labs  Lab Results   Component Value Date    WBC 9.5 01/25/2022    HGB 9.3 (L) 01/25/2022    HCT 30.9 (L) 01/25/2022     01/25/2022     01/25/2022    K 4.6 01/25/2022     01/25/2022    CREATININE 1.4 (H) 01/25/2022    BUN 16 01/25/2022    CO2 25 01/25/2022    GLUCOSE 87 01/25/2022    ALT 37 (H) 01/25/2022    AST 24 01/25/2022    INR 1.2 09/15/2020    TSH 8.210 (H) 01/24/2022       *All labs in electric medical record were reviewed.  Please see electronic chart for a more comprehensive set of labs    XR CHEST PORTABLE    Result Date: 1/21/2022  EXAMINATION: ONE XRAY VIEW OF THE CHEST 1/21/2022 4:00 am COMPARISON: 09/18/2017 HISTORY: ORDERING SYSTEM PROVIDED HISTORY: eval for pneumonia TECHNOLOGIST PROVIDED HISTORY: Reason for exam:->eval for pneumonia FINDINGS: There is no cardiomegaly. There is no pulmonary vascular congestion or infiltrate. There is minimal subsegmental atelectasis in lower left lung. There is no pleural effusion. There is no pneumothorax. There is no significant abnormality seen. CTA PULMONARY W CONTRAST    Result Date: 1/21/2022  EXAMINATION: CTA OF THE CHEST 1/21/2022 4:48 am TECHNIQUE: CTA of the chest was performed after the administration of intravenous contrast.  Multiplanar reformatted images are provided for review. MIP images are provided for review. Dose modulation, iterative reconstruction, and/or weight based adjustment of the mA/kV was utilized to reduce the radiation dose to as low as reasonably achievable. COMPARISON: CT abdomen of 09/12/2020 HISTORY: ORDERING SYSTEM PROVIDED HISTORY: eval for PE TECHNOLOGIST PROVIDED HISTORY: Reason for exam:->eval for PE Decision Support Exception - unselect if not a suspected or confirmed emergency medical condition->Emergency Medical Condition (MA) FINDINGS: Pulmonary Arteries: Pulmonary arteries are adequately opacified for evaluation. No evidence of intraluminal filling defect to suggest pulmonary embolism. Main pulmonary artery is normal in caliber. Mediastinum: No evidence of mediastinal lymphadenopathy. The heart and pericardium demonstrate no acute abnormality. There is no aortic dissection seen. There is calcified plaque involving thoracic aorta. Lungs/pleura: There is mild dependent atelectasis at the lung bases. There is no acute infiltrate seen. There is no pleural effusion or pneumothorax. Upper Abdomen: Limited images of the upper abdomen demonstrate no acute abnormality. Status post interval cholecystectomy. Bones: There is a T6 marked compression deformity which is of unknown age. Compression deformity of L1 is unchanged.   There is a healing fracture involving the left 12th rib. 1. No evidence for pulmonary embolism. 2. T6 marked compression deformity is of unknown age. Compression deformity of L1 is unchanged from 2020. 3. There is a healing fracture involving the left 12th rib. RECOMMENDATIONS: Unavailable     US ABDOMEN LIMITED Specify organ? LIVER, GALLBLADDER    Result Date: 1/21/2022  EXAMINATION: RIGHT UPPER QUADRANT ULTRASOUND 1/21/2022 9:14 am COMPARISON: None. HISTORY: ORDERING SYSTEM PROVIDED HISTORY: elevated transaminase measurements TECHNOLOGIST PROVIDED HISTORY: Reason for exam:->elevated transaminase measurements Specify organ?->LIVER Specify organ?->GALLBLADDER What reading provider will be dictating this exam?->CRC FINDINGS: LIVER:  There is slight coarsened echotexture of the liver without evidence of focal lesion. There is subtle suggestion of nodular contour. BILIARY SYSTEM:  Status post cholecystectomy. Common bile duct measures approximately 3.2 mm in diameter RIGHT KIDNEY: The right kidney measures approximately 7.6 cm in length without evidence of hydronephrosis. There is cortical thinning and lobular contour of the right kidney. There is a cyst measuring approximately 1.3 cm in size. There is 2nd probable cyst measuring approximately 1 cm in size. PANCREAS:  Visualized portions of the pancreas are unremarkable. There appears to be mild dilatation of the pancreatic duct. 1. There appears to be dilatation of the pancreatic duct without ultrasound evidence of focal pancreatic lesion. Consider CT or MRI for further evaluation. 2. Status post cholecystectomy without biliary ductal dilatation. 3. Coarsened echotexture of the liver parenchyma with possible slight nodular contour but no evidence of focal lesion. MICROBIOLOGY:  BLOOD CX #1  No results for input(s): BC in the last 72 hours. BLOOD CX #2  No results for input(s): [place] Ter[place] in the last 72 hours.    CULTURE, RESPIRATORY   No results for input(s): CULTRESP in the last 72 hours. RESPIRATORY SMEAR  No results for input(s): RESPSMEAR in the last 72 hours. STREP PNEUMONIA AG URINE  No results for input(s): STREPNEUMAGU in the last 72 hours. LEGIONELLA AG URINE  No results for input(s): LEGUR in the last 72 hours. No results for input(s): [address] in the last 72 hours. URINE CULTURE  No results for input(s): LABURIN in the last 72 hours. WOUND ABSCESS  No results for input(s): WNDABS in the last 72 hours. TIP CULTURE  No results for input(s): CXCATHTIP in the last 72 hours. BODY FLUID CULTURE  No results for input(s): BFCS in the last 72 hours. Anaerobic cx  No results for input(s): LABANAE in the last 72 hours. CULTURE SURGICAL  No results for input(s): CXSURG in the last 72 hours. MISC. SENDOUT  No results for input(s): MREF in the last 72 hours. Assessment/Plan  Donis Cabrera is a 78y.o. year old female who presented on 1/21/2022 and is being treated for Sepsis secondary to UTI ([address] . Complete Problem List:    Patient Active Problem List    Diagnosis Date Noted    Elevated transaminase level 01/21/2022    Essential hypertension 09/17/2017    Hypothyroidism 09/17/2017    Sepsis secondary to UTI ([address] 01/21/2022    CKD (chronic kidney disease) stage 3, GFR 30-59 ml/min ([address] 01/21/2022    Pure hypercholesterolemia 10/22/2019    Cognitive dysfunction 10/22/2019    Moderate obesity 02/05/2018    Nonischemic cardiomyopathy ([address] 10/25/2017    Chronic combined systolic and diastolic HF (heart failure) ([address] 10/25/2017    History of TIA (transient ischemic attack)      Plan  · UTI/sepsis:   ? UA noted. Urine cx collecgted 1/24/22  ? Gentle IVF hydration  · MRSA/Pseudomonas Bacteremia  · ID consulted  · Antibiotics per them   · Follow cultures   · Elevated transaminase measurements-H/o choledocholithiasis:   ? H/o cholecystectomy   ? Check lipase - wnl    ?  Right upper quadrant ultrasound - pancreatic duct dilatation ? MRCP same   ? Gi has evaluated the patient today, agree with monitoring clinically for now   · DANIEL on CKD   · US neg for hydro   · Monitor BMP daily   · Continue gentle IVFs   · Hypomagnesemia:   ? Replace and monitor  · Weakness/frequent falls:  ? PT/OT    · Continue home medications  · Follow labs  · DVT prophylaxis. · Please see orders for further management and care. ·  for discharge planning  · Discharge plan: Plan for NIC     Electronically signed by Olive Lynne MD on 1/25/2022 at 8:24 AM    NOTE:  This report was transcribed using voice recognition software. Every effort was made to ensure accuracy; however, inadvertent computerized transcription errors may be present.

## 2022-01-25 NOTE — PROGRESS NOTES
PROGRESS NOTE        Patient Presents with/Seen in Consultation For      *moderate pancreatic duct dilation   CHIEF COMPLAINT: Shortness of breath, confusion, fatigue and fever  Subjective:     Patient seen sitting in bed eating breakfast, in no apparent distress. Tolerating diet. Denies abdominal pain, nausea or vomiting. Review of Systems  Aside from what was mentioned in the PMH and HPI, essentially unremarkable, all others negative. Objective:     BP (!) 146/75   Pulse 65   Temp 97.9 °F (36.6 °C) (Axillary)   Resp 16   Ht 5' 5\" (1.651 m)   Wt 169 lb 1.6 oz (76.7 kg)   SpO2 97%   BMI 28.14 kg/m²     General appearance: alert, awake, laying in bed, and cooperative  Eyes: conjunctiva pale, sclera anicteric. PERRL.   Lungs: clear to auscultation bilaterally  Heart: regular rate and rhythm, no murmur, 2+ pulses; trace edema  Abdomen: soft, non-tender; bowel sounds normal; no masses,  no organomegaly  Extremities: extremities trace edema  Pulses: 2+ and symmetric  Skin: Skin color, texture, turgor normal.   Neurologic: Grossly normal    enoxaparin (LOVENOX) injection 30 mg, Daily  cefepime (MAXIPIME) 2000 mg IVPB minibag, Q12H  vancomycin 1000 mg IVPB in 250 mL D5W addavial, Q24H  sodium chloride flush 0.9 % injection 10 mL, 2 times per day  sodium chloride flush 0.9 % injection 10 mL, PRN  0.9 % sodium chloride infusion, PRN  potassium chloride (KLOR-CON M) extended release tablet 40 mEq, PRN   Or  potassium bicarb-citric acid (EFFER-K) effervescent tablet 40 mEq, PRN   Or  potassium chloride 10 mEq/100 mL IVPB (Peripheral Line), PRN  ondansetron (ZOFRAN-ODT) disintegrating tablet 4 mg, Q8H PRN   Or  ondansetron (ZOFRAN) injection 4 mg, Q6H PRN  senna (SENOKOT) tablet 8.6 mg, Daily PRN  acetaminophen (TYLENOL) tablet 650 mg, Q6H PRN   Or  acetaminophen (TYLENOL) suppository 650 mg, Q6H PRN  traMADol (ULTRAM) tablet 100 mg, TID  aspirin chewable tablet 81 mg, Daily  Calcium Carb-Cholecalciferol 250-125 MG-UNIT TABS 250 mg, Daily  losartan (COZAAR) tablet 50 mg, Daily  levothyroxine (SYNTHROID) tablet 75 mcg, Daily  pantoprazole (PROTONIX) tablet 40 mg, Daily  atorvastatin (LIPITOR) tablet 40 mg, Nightly  topiramate (TOPAMAX) tablet 25 mg, Daily  metoprolol succinate (TOPROL XL) extended release tablet 25 mg, Daily  buPROPion (WELLBUTRIN XL) extended release tablet 150 mg, BID  busPIRone (BUSPAR) tablet 15 mg, BID  donepezil (ARICEPT) tablet 5 mg, Nightly  conjugated estrogens (PREMARIN) vaginal cream, Once per day on Mon Wed Fri  sucralfate (CARAFATE) tablet 1 g, 4 times per day  potassium chloride (KLOR-CON M) extended release tablet 20 mEq, Daily  0.9 % sodium chloride infusion, Continuous         Data Review  CBC:   Lab Results   Component Value Date    WBC 9.5 01/25/2022    RBC 3.89 01/25/2022    HGB 9.3 01/25/2022    HCT 30.9 01/25/2022    MCV 79.4 01/25/2022    MCH 23.9 01/25/2022    MCHC 30.1 01/25/2022    RDW 20.0 01/25/2022     01/25/2022    MPV 10.2 01/25/2022     CMP:    Lab Results   Component Value Date     01/25/2022    K 4.6 01/25/2022    K 4.4 01/23/2022     01/25/2022    CO2 25 01/25/2022    BUN 16 01/25/2022    CREATININE 1.4 01/25/2022    GFRAA 43 01/25/2022    LABGLOM 36 01/25/2022    GLUCOSE 87 01/25/2022    PROT 5.7 01/25/2022    LABALBU 2.9 01/25/2022    CALCIUM 9.2 01/25/2022    BILITOT 0.3 01/25/2022    ALKPHOS 188 01/25/2022    AST 24 01/25/2022    ALT 37 01/25/2022     Hepatic Function Panel:    Lab Results   Component Value Date    ALKPHOS 188 01/25/2022    ALT 37 01/25/2022    AST 24 01/25/2022    PROT 5.7 01/25/2022    BILITOT 0.3 01/25/2022    BILIDIR 0.4 01/21/2022    IBILI 0.4 01/21/2022    LABALBU 2.9 01/25/2022     No components found for: CHLPL  No results found for: TRIG    Lab Results   Component Value Date    HDL 34 01/25/2022       Lab Results   Component Value Date    LDLCALC 46 01/25/2022       Lab Results   Component Value Date    LABVLDL 21 01/25/2022 PT/INR:    Lab Results   Component Value Date    PROTIME 13.5 09/15/2020    INR 1.2 09/15/2020     IRON:    Lab Results   Component Value Date    IRON 54 01/24/2022     Iron Saturation:  No components found for: PERCENTFE  FERRITIN:    Lab Results   Component Value Date    FERRITIN 69 01/24/2022         Assessment:     Active Problems:  ? Pancreatic duct dilatation  ? Cholecystectomy 9/16/2020  ? History choledocholithiasis s/p ERCP 9/15/20  ? Fatty liver disease  ? Elevated LFT's- improving, medications/MRI reviewed  ? MRSA/Pseudomonas bacteremia- ID following       Plan:   ? Serology pending to include tumor markers, negative thus far, CEA 4.4  ? Diet as tolerated  ? Antibiotocs per ID  ? IVF per PCP  ? Will need endoscopic work up, to be arranged and done as outpatient  ? Will continue to monitor with imaging as an outpatient, if further clarification needed will proceed with EUS as outpatient   ? Supportive care  ? Continue to monitor       Note: This report was completed utilizing computer voice recognition software. Every effort has been made to ensure accuracy, however; inadvertent computerized transcription errors may be present.      Discussed with Dr. Spike Mckeon per Dr. Yeyo FOSTER-NP-C 1/25/2022  11:15 AM For Dr. Alia Schofield

## 2022-01-25 NOTE — PROGRESS NOTES
Dr. Yariel Echevarria MD,    Your patient is on a medication that requires a renal dose adjustment. Renal Function Assessment:    Date Body Weight IBW  Adjusted BW SCr  CrCl Dialysis status   1/25/2022 169 lb 1.6 oz (76.7 kg)  Ideal body weight: 57 kg (125 lb 10.6 oz)  Adjusted ideal body weight: 64.9 kg (143 lb 0.6 oz) Serum creatinine: 1.4 mg/dL (H) 01/25/22 0343  Estimated creatinine clearance: 31 mL/min (A) N/a       Pharmacy has renally dose-adjusted the following medication(s):    Date Original Order Renally Adjusted Order   1/25/2022 Lovenox 40mg daily Lovenox 30mg daily       These changes were made per protocol according to the [facility] Renal Function-Based Dose Adjustments Policy    *Please note this dose may need readjusted if your patient's renal function significantly improves. Please contact pharmacy with any questions regarding these changes.     harriet pearl, [facility]  1/25/2022  6:43 AM

## 2022-01-26 LAB
AFP-TUMOR MARKER: 1 NG/ML (ref 0–9)
ALBUMIN SERPL-MCNC: 3.1 G/DL (ref 3.5–5.2)
ALP BLD-CCNC: 182 U/L (ref 35–104)
ALT SERPL-CCNC: 31 U/L (ref 0–32)
ANION GAP SERPL CALCULATED.3IONS-SCNC: 10 MMOL/L (ref 7–16)
ANISOCYTOSIS: ABNORMAL
AST SERPL-CCNC: 21 U/L (ref 0–31)
BASOPHILS ABSOLUTE: 0.1 E9/L (ref 0–0.2)
BASOPHILS RELATIVE PERCENT: 1 % (ref 0–2)
BILIRUB SERPL-MCNC: 0.3 MG/DL (ref 0–1.2)
BUN BLDV-MCNC: 14 MG/DL (ref 6–23)
CALCIUM SERPL-MCNC: 9.3 MG/DL (ref 8.6–10.2)
CHLORIDE BLD-SCNC: 104 MMOL/L (ref 98–107)
CO2: 24 MMOL/L (ref 22–29)
CREAT SERPL-MCNC: 1.4 MG/DL (ref 0.5–1)
EOSINOPHILS ABSOLUTE: 0.41 E9/L (ref 0.05–0.5)
EOSINOPHILS RELATIVE PERCENT: 4 % (ref 0–6)
GFR AFRICAN AMERICAN: 43
GFR NON-AFRICAN AMERICAN: 36 ML/MIN/1.73
GLUCOSE BLD-MCNC: 87 MG/DL (ref 74–99)
HCT VFR BLD CALC: 31.5 % (ref 34–48)
HEMOGLOBIN: 9.4 G/DL (ref 11.5–15.5)
HYPOCHROMIA: ABNORMAL
LYMPHOCYTES ABSOLUTE: 2.75 E9/L (ref 1.5–4)
LYMPHOCYTES RELATIVE PERCENT: 27 % (ref 20–42)
MCH RBC QN AUTO: 23.9 PG (ref 26–35)
MCHC RBC AUTO-ENTMCNC: 29.8 % (ref 32–34.5)
MCV RBC AUTO: 79.9 FL (ref 80–99.9)
MONOCYTES ABSOLUTE: 1.02 E9/L (ref 0.1–0.95)
MONOCYTES RELATIVE PERCENT: 10 % (ref 2–12)
MYELOCYTE PERCENT: 1 % (ref 0–0)
NEUTROPHILS ABSOLUTE: 5.92 E9/L (ref 1.8–7.3)
NEUTROPHILS RELATIVE PERCENT: 57 % (ref 43–80)
OVALOCYTES: ABNORMAL
PDW BLD-RTO: 20.2 FL (ref 11.5–15)
PLATELET # BLD: 260 E9/L (ref 130–450)
PMV BLD AUTO: 10.4 FL (ref 7–12)
POIKILOCYTES: ABNORMAL
POLYCHROMASIA: ABNORMAL
POTASSIUM SERPL-SCNC: 4.1 MMOL/L (ref 3.5–5)
RBC # BLD: 3.94 E12/L (ref 3.5–5.5)
SODIUM BLD-SCNC: 138 MMOL/L (ref 132–146)
TARGET CELLS: ABNORMAL
TOTAL PROTEIN: 5.6 G/DL (ref 6.4–8.3)
URINE CULTURE, ROUTINE: NORMAL
WBC # BLD: 10.2 E9/L (ref 4.5–11.5)

## 2022-01-26 PROCEDURE — 2060000000 HC ICU INTERMEDIATE R&B

## 2022-01-26 PROCEDURE — 85025 COMPLETE CBC W/AUTO DIFF WBC: CPT

## 2022-01-26 PROCEDURE — 97530 THERAPEUTIC ACTIVITIES: CPT

## 2022-01-26 PROCEDURE — 2580000003 HC RX 258: Performed by: INTERNAL MEDICINE

## 2022-01-26 PROCEDURE — 6370000000 HC RX 637 (ALT 250 FOR IP): Performed by: INTERNAL MEDICINE

## 2022-01-26 PROCEDURE — 36415 COLL VENOUS BLD VENIPUNCTURE: CPT

## 2022-01-26 PROCEDURE — 6360000002 HC RX W HCPCS: Performed by: SPECIALIST

## 2022-01-26 PROCEDURE — 97535 SELF CARE MNGMENT TRAINING: CPT

## 2022-01-26 PROCEDURE — 6360000002 HC RX W HCPCS: Performed by: INTERNAL MEDICINE

## 2022-01-26 PROCEDURE — 6370000000 HC RX 637 (ALT 250 FOR IP): Performed by: REGISTERED NURSE

## 2022-01-26 PROCEDURE — 2580000003 HC RX 258: Performed by: SPECIALIST

## 2022-01-26 PROCEDURE — 80053 COMPREHEN METABOLIC PANEL: CPT

## 2022-01-26 RX ADMIN — BUPROPION HYDROCHLORIDE 150 MG: 150 TABLET, EXTENDED RELEASE ORAL at 20:18

## 2022-01-26 RX ADMIN — Medication 10 ML: at 09:17

## 2022-01-26 RX ADMIN — TRAMADOL HYDROCHLORIDE 100 MG: 50 TABLET, COATED ORAL at 09:12

## 2022-01-26 RX ADMIN — PANTOPRAZOLE SODIUM 40 MG: 40 TABLET, DELAYED RELEASE ORAL at 09:12

## 2022-01-26 RX ADMIN — Medication 250 MG: at 09:13

## 2022-01-26 RX ADMIN — SUCRALFATE 1 G: 1 TABLET ORAL at 11:04

## 2022-01-26 RX ADMIN — TRAMADOL HYDROCHLORIDE 100 MG: 50 TABLET, COATED ORAL at 20:18

## 2022-01-26 RX ADMIN — SODIUM CHLORIDE: 9 INJECTION, SOLUTION INTRAVENOUS at 00:35

## 2022-01-26 RX ADMIN — METOPROLOL SUCCINATE 25 MG: 25 TABLET, FILM COATED, EXTENDED RELEASE ORAL at 09:12

## 2022-01-26 RX ADMIN — ENOXAPARIN SODIUM 30 MG: 30 INJECTION SUBCUTANEOUS at 09:13

## 2022-01-26 RX ADMIN — TOPIRAMATE 25 MG: 25 TABLET, FILM COATED ORAL at 09:12

## 2022-01-26 RX ADMIN — CEFEPIME 2000 MG: 2 INJECTION, POWDER, FOR SOLUTION INTRAMUSCULAR; INTRAVENOUS at 12:09

## 2022-01-26 RX ADMIN — LEVOFLOXACIN 750 MG: 500 TABLET, FILM COATED ORAL at 14:04

## 2022-01-26 RX ADMIN — BUSPIRONE HYDROCHLORIDE 15 MG: 5 TABLET ORAL at 20:18

## 2022-01-26 RX ADMIN — BUSPIRONE HYDROCHLORIDE 15 MG: 5 TABLET ORAL at 09:12

## 2022-01-26 RX ADMIN — SUCRALFATE 1 G: 1 TABLET ORAL at 05:55

## 2022-01-26 RX ADMIN — TRAMADOL HYDROCHLORIDE 100 MG: 50 TABLET, COATED ORAL at 14:05

## 2022-01-26 RX ADMIN — LEVOTHYROXINE SODIUM 75 MCG: 75 TABLET ORAL at 05:56

## 2022-01-26 RX ADMIN — BUPROPION HYDROCHLORIDE 150 MG: 150 TABLET, EXTENDED RELEASE ORAL at 09:12

## 2022-01-26 RX ADMIN — POTASSIUM CHLORIDE 20 MEQ: 20 TABLET, EXTENDED RELEASE ORAL at 09:13

## 2022-01-26 RX ADMIN — ASPIRIN 81 MG CHEWABLE TABLET 81 MG: 81 TABLET CHEWABLE at 09:12

## 2022-01-26 RX ADMIN — CONJUGATED ESTROGENS: 0.62 CREAM VAGINAL at 09:13

## 2022-01-26 RX ADMIN — SUCRALFATE 1 G: 1 TABLET ORAL at 17:32

## 2022-01-26 RX ADMIN — ATORVASTATIN CALCIUM 40 MG: 40 TABLET, FILM COATED ORAL at 20:18

## 2022-01-26 RX ADMIN — ONDANSETRON 4 MG: 4 TABLET, ORALLY DISINTEGRATING ORAL at 16:01

## 2022-01-26 RX ADMIN — DONEPEZIL HYDROCHLORIDE 5 MG: 5 TABLET, FILM COATED ORAL at 20:18

## 2022-01-26 RX ADMIN — LOSARTAN POTASSIUM 50 MG: 50 TABLET, FILM COATED ORAL at 09:12

## 2022-01-26 RX ADMIN — Medication 10 ML: at 20:18

## 2022-01-26 ASSESSMENT — PAIN - FUNCTIONAL ASSESSMENT
PAIN_FUNCTIONAL_ASSESSMENT: ACTIVITIES ARE NOT PREVENTED
PAIN_FUNCTIONAL_ASSESSMENT: ACTIVITIES ARE NOT PREVENTED

## 2022-01-26 ASSESSMENT — PAIN DESCRIPTION - ORIENTATION
ORIENTATION: MID;LOWER
ORIENTATION: MID;LOWER

## 2022-01-26 ASSESSMENT — PAIN DESCRIPTION - PROGRESSION
CLINICAL_PROGRESSION: NOT CHANGED
CLINICAL_PROGRESSION: NOT CHANGED

## 2022-01-26 ASSESSMENT — PAIN DESCRIPTION - LOCATION
LOCATION: BACK
LOCATION: BACK

## 2022-01-26 ASSESSMENT — PAIN SCALES - GENERAL
PAINLEVEL_OUTOF10: 7
PAINLEVEL_OUTOF10: 0

## 2022-01-26 ASSESSMENT — PAIN DESCRIPTION - DESCRIPTORS
DESCRIPTORS: ACHING;CONSTANT;DISCOMFORT
DESCRIPTORS: ACHING;DISCOMFORT;CONSTANT

## 2022-01-26 ASSESSMENT — PAIN DESCRIPTION - PAIN TYPE
TYPE: CHRONIC PAIN
TYPE: CHRONIC PAIN

## 2022-01-26 ASSESSMENT — PAIN DESCRIPTION - ONSET
ONSET: ON-GOING
ONSET: ON-GOING

## 2022-01-26 ASSESSMENT — PAIN DESCRIPTION - FREQUENCY
FREQUENCY: CONTINUOUS
FREQUENCY: CONTINUOUS

## 2022-01-26 NOTE — DISCHARGE INSTR - COC
Continuity of Care Form    Patient Name: Paddy Pearce   :  1940  MRN:  [id number]    Admit date:  2022  Discharge date:  22    Code Status Order: Full Code   Advance Directives:      Admitting Physician:  Daniel Mcgee MD  PCP: Anna Machado MD    Discharging Nurse: [facility] Unit/Room#: 4854/4997-Z  Discharging Unit Phone Number: [phone]    Emergency Contact:   Extended Emergency Contact Information  Primary Emergency Contact: WesleyDottiePrisca  Address: [address] [place] [address] Phone: [phone]  Relation: Child  Secondary Emergency Contact: Sriram Wells  [place] Phone: [phone]  Relation: Child    Past Surgical History:  Past Surgical History:   Procedure Laterality Date    CHOLECYSTECTOMY, LAPAROSCOPIC N/A 2020    LAPAROSCOPIC CHOLECYSTECTOMY performed by Faye Porter MD at [facility]    ERCP N/A 9/15/2020    ERCP STENT INSERTION with BALLOON SWEEPING and PAPILLOTOMY performed by Alfonso Fox MD at [facility] ENDOSCOPY    ERCP N/A 10/21/2020    ERCP STENT REMOVAL performed by Alfonso Fox MD at [address]         Immunization History:   Immunization History   Administered Date(s) Administered    COVID-19, Pfizer Purple top, DILUTE for use, 12+ yrs, 30mcg/0.3mL dose 2021, 2021, 2021       Active Problems:  Patient Active Problem List   Diagnosis Code    Essential hypertension I10    Hypothyroidism E03.9    History of TIA (transient ischemic attack) Z86.73    Nonischemic cardiomyopathy ([facility] I42.8    Chronic combined systolic and diastolic HF (heart failure) ([facility]) I50.42    Moderate obesity E66.8    Pure hypercholesterolemia E78.00    Cognitive dysfunction F09    Sepsis secondary to UTI ([facility] A41.9, N39.0    Elevated transaminase level R74.01    CKD (chronic kidney disease) stage 3, GFR 30-59 ml/min ([facility]) N18.30 Isolation/Infection:   Isolation            No Isolation          Patient Infection Status       Infection Onset Added Last Indicated Last Indicated By Review Planned Expiration Resolved Resolved By    None active    Resolved    COVID-19 (Rule Out) 01/21/22 01/21/22 01/21/22 COVID-19, Rapid (Ordered)   01/21/22 Rule-Out Test Resulted    COVID-19 (Rule Out) 10/16/20 10/16/20 10/16/20 Covid-19 Ambulatory (Ordered)   10/18/20 Rule-Out Test Resulted            Nurse Assessment:  Last Vital Signs: BP (!) 150/58   Pulse 60   Temp 98.6 °F (37 °C) (Oral)   Resp 20   Ht 5' 5\" (1.651 m)   Wt 161 lb 14.4 oz (73.4 kg)   SpO2 99%   BMI 26.94 kg/m²     Last documented pain score (0-10 scale): Pain Level: 7  Last Weight:   Wt Readings from Last 1 Encounters:   01/26/22 161 lb 14.4 oz (73.4 kg)     Mental Status:  oriented    IV Access:  - None    Nursing Mobility/ADLs:  Walking   Assisted  Transfer  Assisted  Bathing  Assisted  Dressing  Independent  Toileting  Independent  Feeding  Independent  Med Admin  Assisted  Med Delivery   whole    Wound Care Documentation and Therapy:        Elimination:  Continence: Bowel: Yes  Bladder: No. Incontinent at times  Urinary Catheter: None   Colostomy/Ileostomy/Ileal Conduit: No       Date of Last BM: 1/24/22    Intake/Output Summary (Last 24 hours) at 1/26/2022 1145  Last data filed at 1/26/2022 0903  Gross per 24 hour   Intake 880 ml   Output 100 ml   Net 780 ml     I/O last 3 completed shifts: In: 900 [P.O.:900]  Out: 100 [Urine:100]    Safety Concerns: At Risk for Falls    Impairments/Disabilities:      Vision and Hearing    Nutrition Therapy:  Current Nutrition Therapy:   - Oral Diet:  General    Routes of Feeding: Oral  Liquids:  Thin Liquids  Daily Fluid Restriction: no  Last Modified Barium Swallow with Video (Video Swallowing Test): not done    Treatments at the Time of Hospital Discharge:   Respiratory Treatments: ***  Oxygen Therapy:  is not on home oxygen therapy. Ventilator:    - No ventilator support    Rehab Therapies: Physical Therapy and Occupational Therapy  Weight Bearing Status/Restrictions: No weight bearing restirctions  Other Medical Equipment (for information only, NOT a DME order):  cane, walker, and hospital bed  Other Treatments: ***    Patient's personal belongings (please select all that are sent with patient):  Glasses, Hearing Aides , Cristian CAR SIGNATURE:  Electronically signed by Giovany Milan RN on 1/27/22 at 4:10 PM EST    CASE MANAGEMENT/SOCIAL WORK SECTION    Inpatient Status Date: 1/21/2022    Readmission Risk Assessment Score:  Readmission Risk              Risk of Unplanned Readmission:  16           Discharging to Facility/ Agency   Name: [facility]   Address: [address]  Phone: [phone]  Fax: [phone]    Dialysis Facility (if applicable)   Name:  Address:  Dialysis Schedule:  Phone:  Fax:    / signature: Electronically signed by ALLAN Ferrera on 1/26/22 at 11:46 AM EST    PHYSICIAN SECTION    Prognosis: Good    Condition at Discharge: Stable    Rehab Potential (if transferring to Rehab): Good    Recommended Labs or Other Treatments After Discharge: ***    Physician Certification: I certify the above information and transfer of Aleyda Forrester  is necessary for the continuing treatment of the diagnosis listed and that she requires [facility] for less 30 days.      Update Admission H&P: {CHP DME Changes in WNL:[id number]}    PHYSICIAN SIGNATURE:  Carol Mills MD

## 2022-01-26 NOTE — PROGRESS NOTES
PROGRESS NOTE        Patient Presents with/Seen in Consultation For      *moderate pancreatic duct dilation   CHIEF COMPLAINT: Shortness of breath, confusion, fatigue and fever    Subjective:     Patient seen Sydell Malling in bed in no apparent distress. Patient with no GI complaints. Patient for potential discharge today to rehab. Plan of care discussed with patient, verbalizing understanding and agreement. Review of Systems  Aside from what was mentioned in the PMH and HPI, essentially unremarkable, all others negative. Objective:     BP (!) 150/58   Pulse 60   Temp 98.6 °F (37 °C) (Oral)   Resp 20   Ht 5' 5\" (1.651 m)   Wt 161 lb 14.4 oz (73.4 kg)   SpO2 99%   BMI 26.94 kg/m²     General appearance: alert, awake, laying in bed, and cooperative  Eyes: conjunctiva pale, sclera anicteric. PERRL.   Lungs: clear to auscultation bilaterally  Heart: regular rate and rhythm, no murmur, 2+ pulses; trace edema  Abdomen: soft, non-tender; bowel sounds normal; no masses,  no organomegaly  Extremities: extremities trace edema  Pulses: 2+ and symmetric  Skin: Skin color, texture, turgor normal.   Neurologic: Grossly normal    enoxaparin (LOVENOX) injection 30 mg, Daily  cefepime (MAXIPIME) 2000 mg IVPB minibag, Q12H  sodium chloride flush 0.9 % injection 10 mL, 2 times per day  sodium chloride flush 0.9 % injection 10 mL, PRN  0.9 % sodium chloride infusion, PRN  potassium chloride (KLOR-CON M) extended release tablet 40 mEq, PRN   Or  potassium bicarb-citric acid (EFFER-K) effervescent tablet 40 mEq, PRN   Or  potassium chloride 10 mEq/100 mL IVPB (Peripheral Line), PRN  ondansetron (ZOFRAN-ODT) disintegrating tablet 4 mg, Q8H PRN   Or  ondansetron (ZOFRAN) injection 4 mg, Q6H PRN  senna (SENOKOT) tablet 8.6 mg, Daily PRN  acetaminophen (TYLENOL) tablet 650 mg, Q6H PRN   Or  acetaminophen (TYLENOL) suppository 650 mg, Q6H PRN  traMADol (ULTRAM) tablet 100 mg, TID  aspirin chewable tablet 81 mg, Daily  Calcium Carb-Cholecalciferol 250-125 MG-UNIT TABS 250 mg, Daily  losartan (COZAAR) tablet 50 mg, Daily  levothyroxine (SYNTHROID) tablet 75 mcg, Daily  pantoprazole (PROTONIX) tablet 40 mg, Daily  atorvastatin (LIPITOR) tablet 40 mg, Nightly  topiramate (TOPAMAX) tablet 25 mg, Daily  metoprolol succinate (TOPROL XL) extended release tablet 25 mg, Daily  buPROPion (WELLBUTRIN XL) extended release tablet 150 mg, BID  busPIRone (BUSPAR) tablet 15 mg, BID  donepezil (ARICEPT) tablet 5 mg, Nightly  conjugated estrogens (PREMARIN) vaginal cream, Once per day on Mon Wed Fri  sucralfate (CARAFATE) tablet 1 g, 4 times per day  potassium chloride (KLOR-CON M) extended release tablet 20 mEq, Daily         Data Review  CBC:   Lab Results   Component Value Date    WBC 10.2 01/26/2022    RBC 3.94 01/26/2022    HGB 9.4 01/26/2022    HCT 31.5 01/26/2022    MCV 79.9 01/26/2022    MCH 23.9 01/26/2022    MCHC 29.8 01/26/2022    RDW 20.2 01/26/2022     01/26/2022    MPV 10.4 01/26/2022     CMP:    Lab Results   Component Value Date     01/26/2022    K 4.1 01/26/2022    K 4.4 01/23/2022     01/26/2022    CO2 24 01/26/2022    BUN 14 01/26/2022    CREATININE 1.4 01/26/2022    GFRAA 43 01/26/2022    LABGLOM 36 01/26/2022    GLUCOSE 87 01/26/2022    PROT 5.6 01/26/2022    LABALBU 3.1 01/26/2022    CALCIUM 9.3 01/26/2022    BILITOT 0.3 01/26/2022    ALKPHOS 182 01/26/2022    AST 21 01/26/2022    ALT 31 01/26/2022     Hepatic Function Panel:    Lab Results   Component Value Date    ALKPHOS 182 01/26/2022    ALT 31 01/26/2022    AST 21 01/26/2022    PROT 5.6 01/26/2022    BILITOT 0.3 01/26/2022    BILIDIR 0.4 01/21/2022    IBILI 0.4 01/21/2022    LABALBU 3.1 01/26/2022     No components found for: CHLPL  No results found for: TRIG    Lab Results   Component Value Date    HDL 34 01/25/2022       Lab Results   Component Value Date    LDLCALC 46 01/25/2022       Lab Results   Component Value Date    LABVLDL 21 01/25/2022      PT/INR: Lab Results   Component Value Date    PROTIME 13.5 09/15/2020    INR 1.2 09/15/2020     IRON:    Lab Results   Component Value Date    IRON 54 01/24/2022     Iron Saturation:  No components found for: PERCENTFE  FERRITIN:    Lab Results   Component Value Date    FERRITIN 69 01/24/2022         Assessment:     Active Problems:  ? Pancreatic duct dilatation  ? Cholecystectomy 9/16/2020  ? History choledocholithiasis s/p ERCP 9/15/20  ? Fatty liver disease  ? Elevated LFT's- improving, medications/MRI reviewed  ? MRSA/Pseudomonas bacteremia- ID following       Plan:   ? Serology pending to include tumor markers, negative thus far, CEA 4.4, CA 19-9/AFP still in process  ? Diet as tolerated  ? Antibiotocs per ID  ? IVF per PCP  ? Will need endoscopic work up, to be arranged and done as outpatient  ? Will continue to monitor with imaging as an outpatient, if further clarification needed will proceed with EUS as outpatient   ? Discharge per PCP, ok from GI POV with outpatient follow up visit       Note: This report was completed utilizing computer voice recognition software. Every effort has been made to ensure accuracy, however; inadvertent computerized transcription errors may be present.      Discussed with Dr. Hany You per Dr. Janny FOSTER-NP-C 1/26/2022  10:48 AM For Dr. Clark Prom

## 2022-01-26 NOTE — PROGRESS NOTES
Internal Medicine Progress Note    Admission date: 1/21/2022  Primary care physician: Cadence Guerra MD    Malini Alex was seen and examined at bedside today. No family present during my examination. Patient reports no acute overnight events, sleeping in bed and easily arousable without difficulty. No acute distress noted during exam.    Review of Systems  There are no new complaints of chest pain, shortness of breath, abdominal pain, nausea, vomiting, diarrhea, constipation, headaches, fevers, chills, lightheadedness, dizziness, calf pain.     Hospital Medications  Current Facility-Administered Medications   Medication Dose Route Frequency Provider Last Rate Last Admin    enoxaparin (LOVENOX) injection 30 mg  30 mg SubCUTAneous Daily Vinod E Volino, DO   30 mg at 01/25/22 0913    cefepime (MAXIPIME) 2000 mg IVPB minibag  2,000 mg IntraVENous Q12H Destinee Goldman MD   Stopped at 01/26/22 0354    sodium chloride flush 0.9 % injection 10 mL  10 mL IntraVENous 2 times per day Vinod E Volino, DO   10 mL at 01/25/22 2049    sodium chloride flush 0.9 % injection 10 mL  10 mL IntraVENous PRN Vinod E Volino, DO        0.9 % sodium chloride infusion  25 mL IntraVENous PRN Vinod E Volino, DO        potassium chloride (KLOR-CON M) extended release tablet 40 mEq  40 mEq Oral PRN Vinod E Volino, DO        Or    potassium bicarb-citric acid (EFFER-K) effervescent tablet 40 mEq  40 mEq Oral PRN Vinod E Volino, DO        Or    potassium chloride 10 mEq/100 mL IVPB (Peripheral Line)  10 mEq IntraVENous PRN Vinod E Volino, DO        ondansetron (ZOFRAN-ODT) disintegrating tablet 4 mg  4 mg Oral Q8H PRN Vinod E Volino, DO        Or    ondansetron (ZOFRAN) injection 4 mg  4 mg IntraVENous Q6H PRN Vinod E Volino, DO        senna (SENOKOT) tablet 8.6 mg  1 tablet Oral Daily PRN Vinod E Volino, DO   8.6 mg at 01/25/22 1811    acetaminophen (TYLENOL) tablet 650 mg  650 mg Oral Q6H PRN Vinod E Volino, DO        Or    acetaminophen (TYLENOL) suppository 650 mg  650 mg Rectal Q6H PRN Vinod Godfrey, DO        traMADol (ULTRAM) tablet 100 mg  100 mg Oral TID Vinod Godfrey, DO   100 mg at 01/25/22 2049    aspirin chewable tablet 81 mg  81 mg Oral Daily Vinod Godfrey, DO   81 mg at 01/25/22 0913    Calcium Carb-Cholecalciferol 250-125 MG-UNIT TABS 250 mg  1 tablet Oral Daily Vinod Godfrey, DO   250 mg at 01/25/22 0913    losartan (COZAAR) tablet 50 mg  50 mg Oral Daily Vinod Godfrey, DO   50 mg at 01/25/22 0913    levothyroxine (SYNTHROID) tablet 75 mcg  75 mcg Oral Daily Vinod Godfrey DO   75 mcg at 01/26/22 0556    pantoprazole (PROTONIX) tablet 40 mg  40 mg Oral Daily Vinod Godfrey, DO   40 mg at 01/25/22 0913    atorvastatin (LIPITOR) tablet 40 mg  40 mg Oral Nightly Vinod Godfrey, DO   40 mg at 01/25/22 2049    topiramate (TOPAMAX) tablet 25 mg  25 mg Oral Daily Vinod Godfrey, DO   25 mg at 01/25/22 0913    metoprolol succinate (TOPROL XL) extended release tablet 25 mg  25 mg Oral Daily Vinod Godfrey, DO   25 mg at 01/25/22 1213    buPROPion (WELLBUTRIN XL) extended release tablet 150 mg  150 mg Oral BID Vinod Godfrey, DO   150 mg at 01/25/22 2135    busPIRone (BUSPAR) tablet 15 mg  15 mg Oral BID Vinod Godfrey, DO   15 mg at 01/25/22 2135    donepezil (ARICEPT) tablet 5 mg  5 mg Oral Nightly Vinod Godfrey, DO   5 mg at 01/25/22 2049    conjugated estrogens (PREMARIN) vaginal cream   Vaginal Once per day on Mon Wed Fri Vinod Godrfey, DO   Given at 01/24/22 1906    sucralfate (CARAFATE) tablet 1 g  1 g Oral 4 times per day Vinod Godfrey, DO   1 g at 01/26/22 0555    potassium chloride (KLOR-CON M) extended release tablet 20 mEq  20 mEq Oral Daily Vinod Godfrey, DO   20 mEq at 01/25/22 0912    0.9 % sodium chloride infusion   IntraVENous Continuous Geoff Danes, MD 50 mL/hr at 01/26/22 0035 New Bag at 01/26/22 0035       PRN Medications  sodium chloride flush, sodium chloride, potassium chloride **OR** potassium alternative oral replacement **OR** potassium chloride, ondansetron **OR** ondansetron, senna, acetaminophen **OR** acetaminophen    Objective  Most Recent Recorded Vitals  /69   Pulse 66   Temp 98.4 °F (36.9 °C) (Oral)   Resp 18   Ht 5' 5\" (1.651 m)   Wt 161 lb 14.4 oz (73.4 kg)   SpO2 98%   BMI 26.94 kg/m²   I/O last 3 completed shifts: In: 900 [P.O.:900]  Out: 100 [Urine:100]  No intake/output data recorded. Physical Exam:  General: AAO to person, place, time, and purpose, NAD, no labored breathing, room air eyes: conjunctivae/corneas clear, sclera non icteric. Skin: color, texture, and turgor normal, no rashes or lesions. Lungs: Slight crackles, no retractions or use of accessory muscles, no vocal fremitus, no rhonchi, no rales  Heart: regular rate and regular rhythm, no murmur  Abdomen: soft, non-tender; bowel sounds normal; no masses,  no organomegaly no rebound no guarding   extremities: atraumatic, no cyanosis, no edema  Neurologic: cranial nerves 2-12 grossly intact, no slurred speech. Most Recent Labs  Lab Results   Component Value Date    WBC 10.2 01/26/2022    HGB 9.4 (L) 01/26/2022    HCT 31.5 (L) 01/26/2022     01/26/2022     01/26/2022    K 4.1 01/26/2022     01/26/2022    CREATININE 1.4 (H) 01/26/2022    BUN 14 01/26/2022    CO2 24 01/26/2022    GLUCOSE 87 01/26/2022    ALT 31 01/26/2022    AST 21 01/26/2022    INR 1.2 09/15/2020    TSH 8.210 (H) 01/24/2022       *All labs in electric medical record were reviewed. Please see electronic chart for a more comprehensive set of labs    XR CHEST PORTABLE    Result Date: 1/21/2022  EXAMINATION: ONE XRAY VIEW OF THE CHEST 1/21/2022 4:00 am COMPARISON: 09/18/2017 HISTORY: ORDERING SYSTEM PROVIDED HISTORY: eval for pneumonia TECHNOLOGIST PROVIDED HISTORY: Reason for exam:->eval for pneumonia FINDINGS: There is no cardiomegaly. There is no pulmonary vascular congestion or infiltrate.   There is minimal subsegmental atelectasis in lower left lung. There is no pleural effusion. There is no pneumothorax. There is no significant abnormality seen. CTA PULMONARY W CONTRAST    Result Date: 1/21/2022  EXAMINATION: CTA OF THE CHEST 1/21/2022 4:48 am TECHNIQUE: CTA of the chest was performed after the administration of intravenous contrast.  Multiplanar reformatted images are provided for review. MIP images are provided for review. Dose modulation, iterative reconstruction, and/or weight based adjustment of the mA/kV was utilized to reduce the radiation dose to as low as reasonably achievable. COMPARISON: CT abdomen of 09/12/2020 HISTORY: ORDERING SYSTEM PROVIDED HISTORY: eval for PE TECHNOLOGIST PROVIDED HISTORY: Reason for exam:->eval for PE Decision Support Exception - unselect if not a suspected or confirmed emergency medical condition->Emergency Medical Condition (MA) FINDINGS: Pulmonary Arteries: Pulmonary arteries are adequately opacified for evaluation. No evidence of intraluminal filling defect to suggest pulmonary embolism. Main pulmonary artery is normal in caliber. Mediastinum: No evidence of mediastinal lymphadenopathy. The heart and pericardium demonstrate no acute abnormality. There is no aortic dissection seen. There is calcified plaque involving thoracic aorta. Lungs/pleura: There is mild dependent atelectasis at the lung bases. There is no acute infiltrate seen. There is no pleural effusion or pneumothorax. Upper Abdomen: Limited images of the upper abdomen demonstrate no acute abnormality. Status post interval cholecystectomy. Bones: There is a T6 marked compression deformity which is of unknown age. Compression deformity of L1 is unchanged. There is a healing fracture involving the left 12th rib. 1. No evidence for pulmonary embolism. 2. T6 marked compression deformity is of unknown age. Compression deformity of L1 is unchanged from 2020. 3.  There is a healing fracture involving the left 12th rib. RECOMMENDATIONS: Unavailable     US ABDOMEN LIMITED Specify organ? LIVER, GALLBLADDER    Result Date: 1/21/2022  EXAMINATION: RIGHT UPPER QUADRANT ULTRASOUND 1/21/2022 9:14 am COMPARISON: None. HISTORY: ORDERING SYSTEM PROVIDED HISTORY: elevated transaminase measurements TECHNOLOGIST PROVIDED HISTORY: Reason for exam:->elevated transaminase measurements Specify organ?->LIVER Specify organ?->GALLBLADDER What reading provider will be dictating this exam?->CRC FINDINGS: LIVER:  There is slight coarsened echotexture of the liver without evidence of focal lesion. There is subtle suggestion of nodular contour. BILIARY SYSTEM:  Status post cholecystectomy. Common bile duct measures approximately 3.2 mm in diameter RIGHT KIDNEY: The right kidney measures approximately 7.6 cm in length without evidence of hydronephrosis. There is cortical thinning and lobular contour of the right kidney. There is a cyst measuring approximately 1.3 cm in size. There is 2nd probable cyst measuring approximately 1 cm in size. PANCREAS:  Visualized portions of the pancreas are unremarkable. There appears to be mild dilatation of the pancreatic duct. 1. There appears to be dilatation of the pancreatic duct without ultrasound evidence of focal pancreatic lesion. Consider CT or MRI for further evaluation. 2. Status post cholecystectomy without biliary ductal dilatation. 3. Coarsened echotexture of the liver parenchyma with possible slight nodular contour but no evidence of focal lesion. MICROBIOLOGY:  BLOOD CX #1  No results for input(s): BC in the last 72 hours. BLOOD CX #2  No results for input(s): Vicky Klahr in the last 72 hours. CULTURE, RESPIRATORY   No results for input(s): CULTRESP in the last 72 hours. RESPIRATORY SMEAR  No results for input(s): RESPSMEAR in the last 72 hours. STREP PNEUMONIA AG URINE  No results for input(s): STREPNEUMAGU in the last 72 hours.   LEGIONELLA AG URINE  No results for input(s): LEGUR in the last 72 hours. No results for input(s): [address] in the last 72 hours. URINE CULTURE  Recent Labs     01/24/22  0950   LABURIN Growth not present     WOUND ABSCESS  No results for input(s): WNDABS in the last 72 hours. TIP CULTURE  No results for input(s): CXCATHTIP in the last 72 hours. BODY FLUID CULTURE  No results for input(s): BFCS in the last 72 hours. Anaerobic cx  No results for input(s): LABANAE in the last 72 hours. CULTURE SURGICAL  No results for input(s): CXSURG in the last 72 hours. MISC. SENDOUT  No results for input(s): MREF in the last 72 hours. Assessment/Plan  Ely Gan is a 78y.o. year old female who presented on 1/21/2022 and is being treated for Sepsis secondary to UTI ([address] . Complete Problem List:    Patient Active Problem List    Diagnosis Date Noted    Elevated transaminase level 01/21/2022    Essential hypertension 09/17/2017    Hypothyroidism 09/17/2017    Sepsis secondary to UTI ([address] 01/21/2022    CKD (chronic kidney disease) stage 3, GFR 30-59 ml/min ([address] 01/21/2022    Pure hypercholesterolemia 10/22/2019    Cognitive dysfunction 10/22/2019    Moderate obesity 02/05/2018    Nonischemic cardiomyopathy ([address] 10/25/2017    Chronic combined systolic and diastolic HF (heart failure) ([address] 10/25/2017    History of TIA (transient ischemic attack)      Plan  · UTI/sepsis:   ? UA noted. Urine cx collecgted 1/24/22  ? Gentle IVF hydration  · MRSA/Pseudomonas Bacteremia  · ID consulted  · Antibiotics per them   · Follow cultures   · Elevated transaminase measurements-H/o choledocholithiasis:   ? H/o cholecystectomy   ? Check lipase - wnl    ? Right upper quadrant ultrasound - pancreatic duct dilatation   ? MRCP same   ?  Gi has evaluated the patient, agree with monitoring clinically for now   · DANIEL on CKD   · US neg for hydro   · Monitor BMP daily   · Stop IVFs today to prevent iatrogenic pulmonary edema   · Hypomagnesemia:   ? Replace and monitor  · Weakness/frequent falls:  ? PT/OT   ? [facility]: 16/24    · Continue home medications  · Follow labs  · DVT prophylaxis. · Please see orders for further management and care. ·  for discharge planning  · Discharge plan: Plan for NIC - hopefully tomorrow if she tolerates flouroquinolone challenge per ID   ·   Darleene Stable, DO  Emergency Medicine PGY-1  3:43 PM  01/26/22    Addendum: I have personally participated in a face-to-face history and physical exam on the date of service with the patient. I have discussed the case with the resident. I also participated in medical decision making with the resident on the date of service and I agree with all of the pertinent clinical information unless indicated in my editing of the note. I have reviewed and edited the note above based on my findings during my history, exam, and decision making on the same day of service. The vitals, labs, imaging, medications and treatment plan were reviewed independently by myself in addition to with the resident doctor. I agree with the above documentation and treatment plan     Electronically signed by Carol Mills MD on 1/26/2022 at 5:08 PM    I can be reached through [facility].

## 2022-01-26 NOTE — CARE COORDINATION
Social Work/Discharge Planning:  Chart reviewed. Patient on room air. Notified therapy of need for updated notes. Requested for [facility] with [facility] to start pre-cert. No need to wait for authorization. Patient will need a negative covid result day of discharge.  will need to complete [id number] once patient has a discharge order. Will continue to follow. Electronically signed by ALLAN Gabriel on 1/26/2022 at 11:54 AM    Addendum:  [facility] with [facility] called with pre-cert approval stating it is good until Friday 1/28. NEED to do [id number] once patient has a discharge order. Will continue to follow.   Electronically signed by ALLAN Gabriel on 1/26/2022 at 4:11 PM

## 2022-01-26 NOTE — PROGRESS NOTES
Occupational Therapy  OT BEDSIDE TREATMENT NOTE      Date:2022  Patient Name: Ronda Schaffer  MRN: [id number]  : 1940  Room: [address] [place] [address]         Evaluating OT: Ernie Penny OTR/L   [id number]       Referring Provider:Vinod Godfrey DO    Specific Provider Orders/Date:OT eval and treat 2022       Diagnosis:  Sepsis secondary to UTI ([facility] [A41.9, N39.0]  Acute respiratory failure with hypoxemia ([facility] [J96.01]     Pertinent Medical History: CHF, TIA, anxiety     Precautions:  Fall Risk       Assessment of current deficits    [x]? Functional mobility            [x]?ADLs           [x]? Strength                  []?Cognition    [x]? Functional transfers          [x]? IADLs         [x]? Safety Awareness   [x]? Endurance    []? Fine Coordination                         [x]? Balance      []? Vision/perception   []? Sensation      []? Gross Motor Coordination             []? ROM           []? Delirium                   []? Motor Control      OT PLAN OF CARE   OT POC based on physician orders, patient diagnosis and results of clinical assessment     Frequency/Duration  2-4 days/wk for 2 weeks PRN   Specific OT Treatment Interventions to include:   ADL retraining/adapted techniques and AE recommendations to increase functional independence within precautions                    Energy conservation techniques to improve tolerance for selfcare routine   Functional transfer/mobility training/DME recommendations for increased independence, safety and fall prevention         Patient/family education to increase safety and functional independence             Environmental modifications for safe mobility and completion of ADLs                             Therapeutic activity to improve functional performance during ADLs.                                          Therapeutic exercise to improve tolerance and functional strength for ADLs    Balance retraining/tolerance tasks for facilitation of postural control with dynamic challenges during ADLs .       Positioning to improve functional independence        Recommended Adaptive Equipment: continue to assess       Home Living: Pt lives daughter,1 st floor set-up. Bathroom setup: tub/shhower, grab bar and seat    Equipment owned: walker, cane     Prior Level of Function: assist  with ADLs , assist with IADLs; ambulated with cane     Pain Level: Pt did not complain of pain. Cognition: Awake and alert. Cues for safety throughout session. Functional Assessment:  AM-PAC Daily Activity Raw Score: 17/24    Initial Eval Status  Date: 1/24/22 Treatment Status  Date:1/26/22 STGs = LTGs  Time frame: 10-14 days   Feeding Independent        Grooming SBA/set-up   setup seated. Supervision    UB Dressing Min A   Supervision    LB Dressing Mod A   PTA:   Daughter assisting   Mod A   Pt required min A to thread clothing over L foot. Assist to arrange over hips when standing.     Min A   Bathing Mod  A   Min A   Toileting SBA  Pt completed darius hygiene after sitting on BSC. Min A clothing management. Pt incontinent in brief. Change brief while seated on the BSC.     Mod I    Bed Mobility  Up in chair upon entry  SBA using rails of bed for support supine <>sit     Supervision    Functional Transfers SBA  Sit-stand from chair, commode  Cueing for tech  (tendency to plop down)  CGA from bed and BSC.   Mod I    Functional Mobility CGA, cane to bathroom   Unsteadiness noted, flexed posture   CGA/SBA,w/walker  Returning from bathroom   (cueing for walker tech)  CGA for safety using straight cane in the room. Pt moving quickly and reaches out for objects to stabilize herself during mobility using cane. Supervision  with good tolerance    Balance Sitting:     Static:  Independent     Dynamic:SBA   Standing: SBA   Supervision    Activity Tolerance Fair + with light activity   No SOB noted   fair  Good  with ADL activity        Comments:  Pt reporting fatigue.   Limited stand tolerance for ADL activity. Cues for safety. Pt completed toileting and mobility in room setting. Returned to bed at end of the session. Bed alarm activated. Education/treatment:  ADL retraining with facilitation of movement to increase self care skills. Therapeutic activity to address balance and endurance for ADL and transfers. Pt education of transfer safety and daily orientation. · Pt has made  progress towards set goals.        Time In: 1:15  Time Out: 1:41     Min Units   Therapeutic Ex [id number]     Therapeutic Activities [id number]    ADL/Self Care [id number] 20 2   Orthotic Management [id number]     Neuro Re-Ed 64818     Non-Billable Time     TOTAL TIMED TREATMENT [facility] JENNA/L [id number]

## 2022-01-26 NOTE — PROGRESS NOTES
[address] [place] [address] [facility]  ABHINAV  Progress Note    SUBJECTIVE:  Chief Complaint   Patient presents with    Fatigue    Altered Mental Status    Extremity Weakness    Fever     Patient is tolerating medications. No reported adverse drug reactions. No nausea, vomiting, diarrhea. Lying in bed. Denies pain. Does not remember what her interaction was to Ciprofloxacin - willing to try Levaquin today. Review of systems:  As stated above in the chief complaint, otherwise negative. Medications:  Scheduled Meds:   levoFLOXacin  750 mg Oral Every Other Day    enoxaparin  30 mg SubCUTAneous Daily    cefepime  2,000 mg IntraVENous Q12H    sodium chloride flush  10 mL IntraVENous 2 times per day    traMADol  100 mg Oral TID    aspirin  81 mg Oral Daily    Calcium Carb-Cholecalciferol  1 tablet Oral Daily    losartan  50 mg Oral Daily    levothyroxine  75 mcg Oral Daily    pantoprazole  40 mg Oral Daily    atorvastatin  40 mg Oral Nightly    topiramate  25 mg Oral Daily    metoprolol succinate  25 mg Oral Daily    buPROPion  150 mg Oral BID    busPIRone  15 mg Oral BID    donepezil  5 mg Oral Nightly    conjugated estrogens   Vaginal Once per day on     sucralfate  1 g Oral 4 times per day    potassium chloride  20 mEq Oral Daily     Continuous Infusions:   sodium chloride       PRN Meds:sodium chloride flush, sodium chloride, potassium chloride **OR** potassium alternative oral replacement **OR** potassium chloride, ondansetron **OR** ondansetron, senna, acetaminophen **OR** acetaminophen    OBJECTIVE:  BP (!) 150/58   Pulse 60   Temp 98.6 °F (37 °C) (Oral)   Resp 20   Ht 5' 5\" (1.651 m)   Wt 161 lb 14.4 oz (73.4 kg)   SpO2 99%   BMI 26.94 kg/m²   Temp  Av.1 °F (36.7 °C)  Min: 97.6 °F (36.4 °C)  Max: 98.6 °F (37 °C)  Constitutional: The patient is awake, alert, and oriented. Lying in bed, in no distress. Skin: Warm and dry. No rashes were noted.    HEENT: Round and reactive pupils. Moist mucous membranes. No ulcerations or thrush. Neck: Supple to movements. Chest: No use of accessory muscles to breathe. Symmetrical expansion. No wheezing, crackles or rhonchi. Cardiovascular: S1 and S2 are rhythmic and regular. No murmurs appreciated. Abdomen: Positive bowel sounds to auscultation. Benign to palpation. No masses felt. No CVA tenderness. Extremities: No edema.   Lines: peripheral    Laboratory and Tests Review:  Lab Results   Component Value Date    WBC 10.2 01/26/2022    WBC 9.5 01/25/2022    WBC 8.4 01/24/2022    HGB 9.4 (L) 01/26/2022    HCT 31.5 (L) 01/26/2022    MCV 79.9 (L) 01/26/2022     01/26/2022     Lab Results   Component Value Date    NEUTROABS 5.92 01/26/2022    NEUTROABS 4.66 01/25/2022    NEUTROABS 3.63 01/24/2022     No results found for: CRPHS  Lab Results   Component Value Date    ALT 31 01/26/2022    AST 21 01/26/2022    ALKPHOS 182 (H) 01/26/2022    BILITOT 0.3 01/26/2022     Lab Results   Component Value Date     01/26/2022    K 4.1 01/26/2022    K 4.4 01/23/2022     01/26/2022    CO2 24 01/26/2022    BUN 14 01/26/2022    CREATININE 1.4 01/26/2022    CREATININE 1.4 01/25/2022    CREATININE 1.4 01/24/2022    GFRAA 43 01/26/2022    LABGLOM 36 01/26/2022    GLUCOSE 87 01/26/2022    PROT 5.6 01/26/2022    LABALBU 3.1 01/26/2022    CALCIUM 9.3 01/26/2022    BILITOT 0.3 01/26/2022    ALKPHOS 182 01/26/2022    AST 21 01/26/2022    ALT 31 01/26/2022     Lab Results   Component Value Date    CRP 21.7 (H) 01/21/2022     No results found for: [address]  Radiology:  Noted     Microbiology:   Blood cultures 1/21/2022: Pseudomonas aeruginosa, Staphylococcus species  Urine culture 1/24/2021: growth not present      ASSESSMENT:  Pseudomonal septicemia  Right flank pain associated with dysuria and polyuria most likely complicated UTI  Leukocytosis from the above, resolved    PLAN:  Continue Cefepime for now   Will attempt Levaquin for eventual discharge on orals   Check final cultures  If patient does not tolerate Levofloxacin will need to finish treatment with IV cefepime     Discussed with Dr. Estela Light, APRN - CNP  1:19 PM  1/26/2022  Pt seen and examined. Above discussed agree with advanced practice nurse. Labs, cultures, and radiographs reviewed. Face to Face encounter occurred. Changes made as necessary.      Román Franco MD

## 2022-01-26 NOTE — PROGRESS NOTES
Physical Therapy Treatment Note     Name: Nai Joshi  : 1940  MRN: [id number]      Date of Service: 2022    Evaluating PT:  Yanely Recio, PT, DPT [id number]      Room #:  7238/8477-C  Diagnosis:  Sepsis secondary to UTI ([facility] [A41.9, N39.0]  Acute respiratory failure with hypoxemia ([facility] [J96.01]  PMHx/PSHx:  Thyroid disease, HTN, TIA, Cognitive dysfunction, Cardiomyopathy, Hyperlipidemia, Anxiety, Depression, CHF, CKD, ERCP, Knee surgery  Procedure/Surgery:  NA  Precautions:  Falls  Equipment Needs:  Has SPC    SUBJECTIVE:    Pt lives with her daughter in a 2 story home with no stairs to enter. Bed is on 1st floor and bath is on 1st floor. Pt ambulated with a SPC PTA. Pt reported her daughter assists with ADLs as needed. Pt is no longer driving. OBJECTIVE:   Initial Evaluation  Date: 2022 Treatment Date:  2022 Short Term/ Long Term   Goals   AM-PAC 6 Clicks 55/27 33/49    Was pt agreeable to Eval/treatment? Yes  Yes     Does pt have pain? Reported no pain. No c/o pain    Bed Mobility  Rolling: SBA  Supine to sit: SBA  Sit to supine: SBA  Scooting: SBA Rolling: SBA  Supine to sit: SBA  Sit to supine: SBA  Scooting: SBA Rolling: Independent  Supine to sit: Independent  Sit to supine: Independent  Scooting: Independent   Transfers Sit to stand: Min A  Stand to sit: Min A  Stand pivot: Mod A Sit to stand: Min A  Stand to sit: Min A  Stand pivot: Min A Sit to stand: SBA  Stand to sit: SBA  Stand pivot: SBA   Ambulation    20 feet x 2 with [employer] with Min A 20 feet x 2, 60 feet with SPC with Min A >50 feet with [employer] with SBA   Stair negotiation: ascended and descended  NT NT NA   ROM BUE:  WFL  BLE:  WFL     Strength BUE:  4-/5  BLE:  4-/5  Increase strength 1/3 grade.    Balance Sitting EOB:  SBA  Dynamic Standing:  Min/Mod A Sitting EOB:  SBA  Dynamic Standing: Min A with SPC Sitting EOB:  Independent  Dynamic Standing:  SBA     Pt is A & O x 3  Sensation:  Pt denies numbness and tingling to extremities  Edema:  None noted    Vitals:  Blood Pressure at rest - Blood Pressure post session -   Heart Rate at rest - Heart Rate post session -   SPO2 at rest 96% SPO2 post session 94%     Therapeutic Exercises:  STS x4, Ambulation 20 feet x 2, LAQs 10x ea, APs 10x ea    Patient education  Pt educated on importance of mobility, safety with mobility, transfers, gait, use of AD for safety    Patient response to education:   Pt verbalized understanding Pt demonstrated skill Pt requires further education in this area   Yes  Yes with verbal cues and assist Yes/Reinforcement     ASSESSMENT:    Conditions Requiring Skilled Therapeutic Intervention:     [x]Decreased strength     []Decreased ROM  [x]Decreased functional mobility  [x]Decreased balance   [x]Decreased endurance   [x]Decreased posture  []Decreased sensation  [x]Decreased coordination   []Decreased vision  [x]Decreased safety awareness   []Increased pain       Comments:  Patient cleared by RN and agreeable to treatment. Patient found in semi [place]'s and reported being tired and wanting to take a nap. Pt reported multiple interruptions and required encouragement to participate. Patient able to get self to seated EOB with HOB elevated with less effort this date and no hands on needed. Patient somewhat impulsive with transfers and educated on safety. Patient not willing to use WW this date and utilized [facility] ineffectively during mobility. Patient with slow gait speed and unsteady on her feet and hands on maintained for balance/safety. Patient ambulated into the bathroom and educated on proper approach to commode as she stopped perpendicular to the commode to pull down brief. Patient independent with hygiene. Patient ambulated out to the bedside and sat EOB to mohan mask prior to further mobility. Patient with slow, antalgic gait with short stride length and forward flexed posture.  Patient initially sequencing cane somewhat appropriately, then on final 30 feet was \"dragging\" the cane behind her. Patient with increased pressure in PT's hand for balance with poor cane use. Education provided with poor carryover. Patient reported increased SOB and SpO2 monitored and documented above. Patient assisted back to seated and then supine with call light and tray table in reach. Treatment:  Patient practiced and was instructed in the following treatment:    · Bed mobility: Verbal/tactile cues for sequencing BUEs/BLEs for safe technique with rolling/supine<>sit task. · Transfer training: Verbal/tactile cues to facilitate proper hand placement, technique and safety during sit to stand task. · Gait training: Verbal and tactile cues to facilitate upright posture and safety as well as provided with physical assistance to complete task. Highly recommend walker use for balance and safety.   · Therapeutic exercises: As noted above  · Neuromuscular education: NT    PHYSICAL THERAPY PLAN OF CARE:    PT POC is established based on physician order and patient diagnosis     Referring provider/PT Order:    01/21/22 0730  PT evaluation and treat  Start:  01/21/22 0730,   End:  01/21/22 0730,   ONE TIME,   Standing Count:  1 Occurrences,   R         Vinod Godfrey,      Diagnosis:  Sepsis secondary to UTI ([facility] [A41.9, N39.0]  Acute respiratory failure with hypoxemia ([facility] [J96.01]  Specific instructions for next treatment:  Subsequent PT sessions with focus on improved safety with walker use, ambulation    Current Treatment Recommendations:     [x] Strengthening to improve independence with functional mobility   [] ROM to improve independence with functional mobility   [x] Balance Training to improve static/dynamic balance and to reduce fall risk  [x] Endurance Training to improve activity tolerance during functional mobility   [x] Transfer Training to improve safety and independence with all functional transfers   [x] Gait Training to improve gait mechanics, endurance and asses need for appropriate assistive device  [] Stair Training in preparation for safe discharge home and/or into the community   [x] Positioning to prevent skin breakdown and contractures  [x] Safety and Education Training   [x] Patient/Caregiver Education   [] HEP  [] Other     Pt is making fair progress toward established physical therapy goals. Continue with current plan of care.        Time in  1020  Time out  1045    Total Treatment Time  25 minutes     CPT codes:  [] Low Complexity PT evaluation 50039  [] Moderate Complexity PT evaluation 76534  [] High Complexity PT evaluation 63196  [] PT Re-evaluation 23992  [] Gait training 04227 - minutes  [] Manual therapy 29936 - minutes  [x] Therapeutic activities 75695 25 minutes  [] Therapeutic exercises 95550 - minutes  [] Neuromuscular reeducation 25360 - minutes     Ivonne Caicedo, PT, DPT  License [id number]

## 2022-01-26 NOTE — PLAN OF CARE
Problem: Falls - Risk of:  Goal: Will remain free from falls  Description: Will remain free from falls  Outcome: Met This Shift     Problem: Falls - Risk of:  Goal: Absence of physical injury  Description: Absence of physical injury  Outcome: Met This Shift     Problem: Skin Integrity:  Goal: Absence of new skin breakdown  Description: Absence of new skin breakdown  Outcome: Met This Shift     Problem: Pain:  Goal: Control of chronic pain  Description: Control of chronic pain  Outcome: Met This Shift

## 2022-01-26 NOTE — PROGRESS NOTES
Physician Progress Note      Blu Ortiz  CSN #:                  [id number]  :                       1940  ADMIT DATE:       2022 2:35 AM  DISCH DATE:  RESPONDING  PROVIDER #:        Demetri Ellis          QUERY TEXT:    Pt admitted with sepsis/UTI. Pt noted to have confusion. If possible, please   document in the progress notes and discharge summary if you are evaluating and   / or treating any of the following: The medical record reflects the following:  Risk Factors: sepsis, UTI  Clinical Indicators: per ED \"She is lethargic and confused. \" Improved after   antibiotic.  PN \"AAO to person, place, time, and purpose. \"  Treatment: IVF, antibiotics    Thank you,  Angel Jewell RN, CCDS  Clinical Documentation Improvement Specialist  [email or web address]  [phone]  Options provided:  -- Metabolic encephalopathy  -- Other - I will add my own diagnosis  -- Disagree - Not applicable / Not valid  -- Disagree - Clinically unable to determine / Unknown  -- Refer to Clinical Documentation Reviewer    PROVIDER RESPONSE TEXT:    This patient has metabolic encephalopathy.     Query created by: Dory Post on 2022 8:35 AM      Electronically signed by:  Demetri Ellis 2022 5:07 PM

## 2022-01-27 VITALS
OXYGEN SATURATION: 98 % | SYSTOLIC BLOOD PRESSURE: 97 MMHG | DIASTOLIC BLOOD PRESSURE: 68 MMHG | BODY MASS INDEX: 26.98 KG/M2 | HEIGHT: 65 IN | TEMPERATURE: 98.2 F | WEIGHT: 161.9 LBS | HEART RATE: 65 BPM | RESPIRATION RATE: 18 BRPM

## 2022-01-27 LAB
ALBUMIN SERPL-MCNC: 2.9 G/DL (ref 3.5–5.2)
ALP BLD-CCNC: 169 U/L (ref 35–104)
ALPHA-1 ANTITRYPSIN: 204 MG/DL (ref 90–200)
ALT SERPL-CCNC: 24 U/L (ref 0–32)
ANION GAP SERPL CALCULATED.3IONS-SCNC: 9 MMOL/L (ref 7–16)
ANISOCYTOSIS: ABNORMAL
AST SERPL-CCNC: 18 U/L (ref 0–31)
ATYPICAL LYMPHOCYTE RELATIVE PERCENT: 4.4 % (ref 0–4)
BASOPHILS ABSOLUTE: 0.09 E9/L (ref 0–0.2)
BASOPHILS RELATIVE PERCENT: 0.9 % (ref 0–2)
BILIRUB SERPL-MCNC: 0.3 MG/DL (ref 0–1.2)
BUN BLDV-MCNC: 12 MG/DL (ref 6–23)
CA 19-9: 130 U/ML (ref 0–37)
CALCIUM SERPL-MCNC: 9.4 MG/DL (ref 8.6–10.2)
CHLORIDE BLD-SCNC: 105 MMOL/L (ref 98–107)
CO2: 25 MMOL/L (ref 22–29)
CREAT SERPL-MCNC: 1.4 MG/DL (ref 0.5–1)
EOSINOPHILS ABSOLUTE: 0.63 E9/L (ref 0.05–0.5)
EOSINOPHILS RELATIVE PERCENT: 6.2 % (ref 0–6)
GFR AFRICAN AMERICAN: 43
GFR NON-AFRICAN AMERICAN: 36 ML/MIN/1.73
GLUCOSE BLD-MCNC: 84 MG/DL (ref 74–99)
HCT VFR BLD CALC: 28.6 % (ref 34–48)
HEMOGLOBIN: 8.9 G/DL (ref 11.5–15.5)
HYPOCHROMIA: ABNORMAL
IGG 1: 287 MG/DL (ref 240–1118)
IGG 2: 183 MG/DL (ref 124–549)
IGG 3: 17 MG/DL (ref 21–134)
IGG 4: 2 MG/DL (ref 1–123)
LYMPHOCYTES ABSOLUTE: 1.63 E9/L (ref 1.5–4)
LYMPHOCYTES RELATIVE PERCENT: 11.5 % (ref 20–42)
MCH RBC QN AUTO: 24.7 PG (ref 26–35)
MCHC RBC AUTO-ENTMCNC: 31.1 % (ref 32–34.5)
MCV RBC AUTO: 79.2 FL (ref 80–99.9)
METAMYELOCYTES RELATIVE PERCENT: 1.8 % (ref 0–1)
MONOCYTES ABSOLUTE: 0.82 E9/L (ref 0.1–0.95)
MONOCYTES RELATIVE PERCENT: 8 % (ref 2–12)
MYELOCYTE PERCENT: 2.6 % (ref 0–0)
NEUTROPHILS ABSOLUTE: 7.04 E9/L (ref 1.8–7.3)
NEUTROPHILS RELATIVE PERCENT: 64.6 % (ref 43–80)
NUCLEATED RED BLOOD CELLS: 0 /100 WBC
PDW BLD-RTO: 20.1 FL (ref 11.5–15)
PLATELET # BLD: 267 E9/L (ref 130–450)
PMV BLD AUTO: 10.1 FL (ref 7–12)
POTASSIUM SERPL-SCNC: 4.1 MMOL/L (ref 3.5–5)
RBC # BLD: 3.61 E12/L (ref 3.5–5.5)
SARS-COV-2, NAAT: NOT DETECTED
SODIUM BLD-SCNC: 139 MMOL/L (ref 132–146)
TOTAL PROTEIN: 5.4 G/DL (ref 6.4–8.3)
WBC # BLD: 10.2 E9/L (ref 4.5–11.5)

## 2022-01-27 PROCEDURE — 85025 COMPLETE CBC W/AUTO DIFF WBC: CPT

## 2022-01-27 PROCEDURE — 6360000002 HC RX W HCPCS: Performed by: INTERNAL MEDICINE

## 2022-01-27 PROCEDURE — 6360000002 HC RX W HCPCS: Performed by: SPECIALIST

## 2022-01-27 PROCEDURE — 2580000003 HC RX 258: Performed by: SPECIALIST

## 2022-01-27 PROCEDURE — 6370000000 HC RX 637 (ALT 250 FOR IP): Performed by: INTERNAL MEDICINE

## 2022-01-27 PROCEDURE — 36415 COLL VENOUS BLD VENIPUNCTURE: CPT

## 2022-01-27 PROCEDURE — 2580000003 HC RX 258: Performed by: INTERNAL MEDICINE

## 2022-01-27 PROCEDURE — 80053 COMPREHEN METABOLIC PANEL: CPT

## 2022-01-27 PROCEDURE — 87635 SARS-COV-2 COVID-19 AMP PRB: CPT

## 2022-01-27 RX ORDER — LEVOFLOXACIN 750 MG/1
750 TABLET ORAL
Qty: 5 TABLET | Refills: 0 | Status: SHIPPED | OUTPATIENT
Start: 2022-01-28 | End: 2022-02-07

## 2022-01-27 RX ADMIN — ONDANSETRON 4 MG: 4 TABLET, ORALLY DISINTEGRATING ORAL at 11:39

## 2022-01-27 RX ADMIN — LOSARTAN POTASSIUM 50 MG: 50 TABLET, FILM COATED ORAL at 09:16

## 2022-01-27 RX ADMIN — CEFEPIME 2000 MG: 2 INJECTION, POWDER, FOR SOLUTION INTRAMUSCULAR; INTRAVENOUS at 00:22

## 2022-01-27 RX ADMIN — SUCRALFATE 1 G: 1 TABLET ORAL at 11:36

## 2022-01-27 RX ADMIN — SUCRALFATE 1 G: 1 TABLET ORAL at 05:34

## 2022-01-27 RX ADMIN — SUCRALFATE 1 G: 1 TABLET ORAL at 17:12

## 2022-01-27 RX ADMIN — BUSPIRONE HYDROCHLORIDE 15 MG: 5 TABLET ORAL at 09:16

## 2022-01-27 RX ADMIN — POTASSIUM CHLORIDE 20 MEQ: 20 TABLET, EXTENDED RELEASE ORAL at 09:17

## 2022-01-27 RX ADMIN — Medication 10 ML: at 09:17

## 2022-01-27 RX ADMIN — CEFEPIME 2000 MG: 2 INJECTION, POWDER, FOR SOLUTION INTRAMUSCULAR; INTRAVENOUS at 11:36

## 2022-01-27 RX ADMIN — LEVOTHYROXINE SODIUM 75 MCG: 75 TABLET ORAL at 05:34

## 2022-01-27 RX ADMIN — BUPROPION HYDROCHLORIDE 150 MG: 150 TABLET, EXTENDED RELEASE ORAL at 09:16

## 2022-01-27 RX ADMIN — METOPROLOL SUCCINATE 25 MG: 25 TABLET, FILM COATED, EXTENDED RELEASE ORAL at 09:16

## 2022-01-27 RX ADMIN — TOPIRAMATE 25 MG: 25 TABLET, FILM COATED ORAL at 09:16

## 2022-01-27 RX ADMIN — SUCRALFATE 1 G: 1 TABLET ORAL at 00:22

## 2022-01-27 RX ADMIN — ASPIRIN 81 MG CHEWABLE TABLET 81 MG: 81 TABLET CHEWABLE at 09:16

## 2022-01-27 RX ADMIN — Medication 250 MG: at 09:17

## 2022-01-27 RX ADMIN — TRAMADOL HYDROCHLORIDE 100 MG: 50 TABLET, COATED ORAL at 13:59

## 2022-01-27 RX ADMIN — TRAMADOL HYDROCHLORIDE 100 MG: 50 TABLET, COATED ORAL at 09:16

## 2022-01-27 RX ADMIN — ENOXAPARIN SODIUM 30 MG: 30 INJECTION SUBCUTANEOUS at 09:17

## 2022-01-27 RX ADMIN — PANTOPRAZOLE SODIUM 40 MG: 40 TABLET, DELAYED RELEASE ORAL at 09:16

## 2022-01-27 ASSESSMENT — PAIN DESCRIPTION - PAIN TYPE
TYPE: CHRONIC PAIN
TYPE: CHRONIC PAIN

## 2022-01-27 ASSESSMENT — PAIN DESCRIPTION - DESCRIPTORS
DESCRIPTORS: ACHING;DISCOMFORT;CONSTANT
DESCRIPTORS: ACHING;CONSTANT;DISCOMFORT

## 2022-01-27 ASSESSMENT — PAIN DESCRIPTION - PROGRESSION
CLINICAL_PROGRESSION: NOT CHANGED
CLINICAL_PROGRESSION: NOT CHANGED

## 2022-01-27 ASSESSMENT — PAIN DESCRIPTION - ORIENTATION
ORIENTATION: MID;LOWER
ORIENTATION: MID;LOWER

## 2022-01-27 ASSESSMENT — PAIN DESCRIPTION - ONSET
ONSET: ON-GOING
ONSET: ON-GOING

## 2022-01-27 ASSESSMENT — PAIN DESCRIPTION - LOCATION
LOCATION: BACK
LOCATION: BACK

## 2022-01-27 ASSESSMENT — PAIN SCALES - GENERAL
PAINLEVEL_OUTOF10: 8
PAINLEVEL_OUTOF10: 7

## 2022-01-27 ASSESSMENT — PAIN DESCRIPTION - FREQUENCY
FREQUENCY: CONTINUOUS
FREQUENCY: CONTINUOUS

## 2022-01-27 NOTE — CARE COORDINATION
Social Work / Discharge Planning:    Plan at discharge is [facility]. Precert approved and good until Friday 1/28/22. Per Rudell Councilman, facility liaison, she will have a room for patient today. HENS [address] completed. ISD in Epic and transportation envelope in chart. Will need negative COVID on day of discharge. Possible discharge today if patient can tolerate levofloxacin. Social work will continue to follow.  Electronically signed by ALLAN Gomez on 1/27/22 at 10:19 AM EST

## 2022-01-27 NOTE — PROGRESS NOTES
[address] [place] [address] [facility]  KARENIDA  Progress Note    SUBJECTIVE:  Chief Complaint   Patient presents with    Fatigue    Altered Mental Status    Extremity Weakness    Fever     Patient is tolerating medications. No reported adverse drug reactions. No nausea, vomiting, diarrhea. Up in chair. No urinary complaints. Denies pain. Tolerating levaquin. Review of systems:  As stated above in the chief complaint, otherwise negative. Medications:  Scheduled Meds:   levoFLOXacin  750 mg Oral Every Other Day    enoxaparin  30 mg SubCUTAneous Daily    cefepime  2,000 mg IntraVENous Q12H    sodium chloride flush  10 mL IntraVENous 2 times per day    traMADol  100 mg Oral TID    aspirin  81 mg Oral Daily    Calcium Carb-Cholecalciferol  1 tablet Oral Daily    losartan  50 mg Oral Daily    levothyroxine  75 mcg Oral Daily    pantoprazole  40 mg Oral Daily    atorvastatin  40 mg Oral Nightly    topiramate  25 mg Oral Daily    metoprolol succinate  25 mg Oral Daily    buPROPion  150 mg Oral BID    busPIRone  15 mg Oral BID    donepezil  5 mg Oral Nightly    conjugated estrogens   Vaginal Once per day on     sucralfate  1 g Oral 4 times per day    potassium chloride  20 mEq Oral Daily     Continuous Infusions:   sodium chloride       PRN Meds:sodium chloride flush, sodium chloride, potassium chloride **OR** potassium alternative oral replacement **OR** potassium chloride, ondansetron **OR** ondansetron, senna, acetaminophen **OR** acetaminophen    OBJECTIVE:  /62   Pulse 70   Temp 98 °F (36.7 °C) (Oral)   Resp 18   Ht 5' 5\" (1.651 m)   Wt 161 lb 14.4 oz (73.4 kg)   SpO2 98%   BMI 26.94 kg/m²   Temp  Av.9 °F (36.6 °C)  Min: 97.7 °F (36.5 °C)  Max: 98.1 °F (36.7 °C)  Constitutional: The patient is awake, alert, and oriented. in no distress. Skin: Warm and dry. No rashes were noted. HEENT: Round and reactive pupils. Moist mucous membranes.   No ulcerations or thrush. Neck: Supple to movements. Chest: No use of accessory muscles to breathe. Symmetrical expansion. No wheezing, crackles or rhonchi. Cardiovascular: S1 and S2 are rhythmic and regular. No murmurs appreciated. Abdomen: Positive bowel sounds to auscultation. Benign to palpation. No masses felt. No CVA tenderness. Extremities: No edema.   Lines: peripheral    Laboratory and Tests Review:  Lab Results   Component Value Date    WBC 10.2 01/27/2022    WBC 10.2 01/26/2022    WBC 9.5 01/25/2022    HGB 8.9 (L) 01/27/2022    HCT 28.6 (L) 01/27/2022    MCV 79.2 (L) 01/27/2022     01/27/2022     Lab Results   Component Value Date    NEUTROABS 7.04 01/27/2022    NEUTROABS 5.92 01/26/2022    NEUTROABS 4.66 01/25/2022     No results found for: CRPHS  Lab Results   Component Value Date    ALT 24 01/27/2022    AST 18 01/27/2022    ALKPHOS 169 (H) 01/27/2022    BILITOT 0.3 01/27/2022     Lab Results   Component Value Date     01/27/2022    K 4.1 01/27/2022    K 4.4 01/23/2022     01/27/2022    CO2 25 01/27/2022    BUN 12 01/27/2022    CREATININE 1.4 01/27/2022    CREATININE 1.4 01/26/2022    CREATININE 1.4 01/25/2022    GFRAA 43 01/27/2022    LABGLOM 36 01/27/2022    GLUCOSE 84 01/27/2022    PROT 5.4 01/27/2022    LABALBU 2.9 01/27/2022    CALCIUM 9.4 01/27/2022    BILITOT 0.3 01/27/2022    ALKPHOS 169 01/27/2022    AST 18 01/27/2022    ALT 24 01/27/2022     Lab Results   Component Value Date    CRP 21.7 (H) 01/21/2022     No results found for: [address]  Radiology:  Noted     Microbiology:   Blood cultures 1/21/2022: Pseudomonas aeruginosa, Staphylococcus species  Urine culture 1/24/2021: growth not present      ASSESSMENT:  · Pseudomonal septicemia  · Right flank pain associated with dysuria and polyuria most likely complicated UTI  · Leukocytosis from the above, resolved    PLAN:  · Continue  - Levaquin for discharge times 10 more days  · Check final cultures      Rajat Orozco, APRN - CNP  1:51 PM  1/27/2022  Pt seen and examined. Above discussed agree with advanced practice nurse. Labs, cultures, and radiographs reviewed. Face to Face encounter occurred. Changes made as necessary.      Feli Castillo MD

## 2022-01-27 NOTE — PROGRESS NOTES
Internal Medicine Progress Note    Admission date: 1/21/2022  Primary care physician: Geoff Valle MD    Malini Farmer was seen and examined at bedside today. She is doing well today   We discussed discharge and she was happy about this   [facility] [place] [facility] [place]   She has no new issues   All questions answered   Discusse with nursing staff no new problems   Discussed with [facility]     Review of Systems  There are no new complaints of chest pain, shortness of breath, abdominal pain, nausea, vomiting, diarrhea, constipation, headaches, fevers, chills, lightheadedness, dizziness, calf pain.     Hospital Medications  Current Facility-Administered Medications   Medication Dose Route Frequency Provider Last Rate Last Admin    levoFLOXacin (LEVAQUIN) tablet 750 mg  750 mg Oral Every Other Day Dorice Armanis, APRN - CNP   750 mg at 01/26/22 1404    enoxaparin (LOVENOX) injection 30 mg  30 mg SubCUTAneous Daily Vinod Godfrey, DO   30 mg at 01/26/22 0913    cefepime (MAXIPIME) 2000 mg IVPB minibag  2,000 mg IntraVENous Q12H Duane Gang, MD   Stopped at 01/27/22 0450    sodium chloride flush 0.9 % injection 10 mL  10 mL IntraVENous 2 times per day Vinod SWAN Volino, DO   10 mL at 01/26/22 2018    sodium chloride flush 0.9 % injection 10 mL  10 mL IntraVENous PRN Vinod SWAN Volino, DO        0.9 % sodium chloride infusion  25 mL IntraVENous PRN Vinod E Volino, DO        potassium chloride (KLOR-CON M) extended release tablet 40 mEq  40 mEq Oral PRN Vinod E Volino, DO        Or    potassium bicarb-citric acid (EFFER-K) effervescent tablet 40 mEq  40 mEq Oral PRN Vinod E Volino, DO        Or    potassium chloride 10 mEq/100 mL IVPB (Peripheral Line)  10 mEq IntraVENous PRN Vinod E Volino, DO        ondansetron (ZOFRAN-ODT) disintegrating tablet 4 mg  4 mg Oral Q8H PRN Vinod E Volino, DO   4 mg at 01/26/22 1601    Or    ondansetron (ZOFRAN) injection 4 mg  4 mg IntraVENous Q6H PRN Vinod E Volino, DO        senna (SENOKOT) tablet 8.6 mg  1 tablet Oral Daily PRN Vinod Godfrey, DO   8.6 mg at 01/25/22 1811    acetaminophen (TYLENOL) tablet 650 mg  650 mg Oral Q6H PRN Vinod Godfrey, DO        Or    acetaminophen (TYLENOL) suppository 650 mg  650 mg Rectal Q6H PRN Vinod Cravenino, DO        traMADol (ULTRAM) tablet 100 mg  100 mg Oral TID Vinod Godfrey, DO   100 mg at 01/26/22 2018    aspirin chewable tablet 81 mg  81 mg Oral Daily Vinod Cravenino, DO   81 mg at 01/26/22 0912    Calcium Carb-Cholecalciferol 250-125 MG-UNIT TABS 250 mg  1 tablet Oral Daily Vinod Godfrey, DO   250 mg at 01/26/22 0913    losartan (COZAAR) tablet 50 mg  50 mg Oral Daily Vinod Cravenino, DO   50 mg at 01/26/22 0912    levothyroxine (SYNTHROID) tablet 75 mcg  75 mcg Oral Daily Vinod Godfrey, DO   75 mcg at 01/27/22 0534    pantoprazole (PROTONIX) tablet 40 mg  40 mg Oral Daily Vinod Cravenino, DO   40 mg at 01/26/22 0912    atorvastatin (LIPITOR) tablet 40 mg  40 mg Oral Nightly Vinod Cravenino, DO   40 mg at 01/26/22 2018    topiramate (TOPAMAX) tablet 25 mg  25 mg Oral Daily Vinod Godfrey, DO   25 mg at 01/26/22 2118    metoprolol succinate (TOPROL XL) extended release tablet 25 mg  25 mg Oral Daily Vinod Godfrey, DO   25 mg at 01/26/22 0912    buPROPion (WELLBUTRIN XL) extended release tablet 150 mg  150 mg Oral BID Vinod Cravenino, DO   150 mg at 01/26/22 2018    busPIRone (BUSPAR) tablet 15 mg  15 mg Oral BID Vinod Godfrey, DO   15 mg at 01/26/22 2018    donepezil (ARICEPT) tablet 5 mg  5 mg Oral Nightly Vinod Cravenino, DO   5 mg at 01/26/22 2018    conjugated estrogens (PREMARIN) vaginal cream   Vaginal Once per day on Mon Wed Fri Vinod Cravenino, DO   Given at 01/26/22 0913    sucralfate (CARAFATE) tablet 1 g  1 g Oral 4 times per day Vinod Godfrey, DO   1 g at 01/27/22 0534    potassium chloride (KLOR-CON M) extended release tablet 20 mEq  20 mEq Oral Daily Vinod Godfrey DO   20 mEq at 01/26/22 0913       PRN Medications  sodium chloride flush, sodium chloride, potassium chloride **OR** potassium alternative oral replacement **OR** potassium chloride, ondansetron **OR** ondansetron, senna, acetaminophen **OR** acetaminophen    Objective  Most Recent Recorded Vitals  /65   Pulse 69   Temp 98.1 °F (36.7 °C) (Oral)   Resp 18   Ht 5' 5\" (1.651 m)   Wt 161 lb 14.4 oz (73.4 kg)   SpO2 99%   BMI 26.94 kg/m²   I/O last 3 completed shifts: In: 850 [P.O.:850]  Out: 100 [Urine:100]  No intake/output data recorded. Physical Exam:  General: AAO to person, place, time, and purpose, NAD, no labored breathing, room air eyes: conjunctivae/corneas clear, sclera non icteric. Skin: color, texture, and turgor normal, no rashes or lesions. Lungs: Slight crackles, no retractions or use of accessory muscles, no vocal fremitus, no rhonchi, no rales  Heart: regular rate and regular rhythm, no murmur  Abdomen: soft, non-tender; bowel sounds normal; no masses,  no organomegaly no rebound no guarding   extremities: atraumatic, no cyanosis, no edema  Neurologic: cranial nerves 2-12 grossly intact, no slurred speech. Most Recent Labs  Lab Results   Component Value Date    WBC 10.2 01/27/2022    HGB 8.9 (L) 01/27/2022    HCT 28.6 (L) 01/27/2022     01/27/2022     01/27/2022    K 4.1 01/27/2022     01/27/2022    CREATININE 1.4 (H) 01/27/2022    BUN 12 01/27/2022    CO2 25 01/27/2022    GLUCOSE 84 01/27/2022    ALT 24 01/27/2022    AST 18 01/27/2022    INR 1.2 09/15/2020    TSH 8.210 (H) 01/24/2022       *All labs in electric medical record were reviewed. Please see electronic chart for a more comprehensive set of labs    XR CHEST PORTABLE    Result Date: 1/21/2022  EXAMINATION: ONE XRAY VIEW OF THE CHEST 1/21/2022 4:00 am COMPARISON: 09/18/2017 HISTORY: ORDERING SYSTEM PROVIDED HISTORY: eval for pneumonia TECHNOLOGIST PROVIDED HISTORY: Reason for exam:->eval for pneumonia FINDINGS: There is no cardiomegaly. There is no pulmonary vascular congestion or infiltrate. There is minimal subsegmental atelectasis in lower left lung. There is no pleural effusion. There is no pneumothorax. There is no significant abnormality seen. CTA PULMONARY W CONTRAST    Result Date: 1/21/2022  EXAMINATION: CTA OF THE CHEST 1/21/2022 4:48 am TECHNIQUE: CTA of the chest was performed after the administration of intravenous contrast.  Multiplanar reformatted images are provided for review. MIP images are provided for review. Dose modulation, iterative reconstruction, and/or weight based adjustment of the mA/kV was utilized to reduce the radiation dose to as low as reasonably achievable. COMPARISON: CT abdomen of 09/12/2020 HISTORY: ORDERING SYSTEM PROVIDED HISTORY: eval for PE TECHNOLOGIST PROVIDED HISTORY: Reason for exam:->eval for PE Decision Support Exception - unselect if not a suspected or confirmed emergency medical condition->Emergency Medical Condition (MA) FINDINGS: Pulmonary Arteries: Pulmonary arteries are adequately opacified for evaluation. No evidence of intraluminal filling defect to suggest pulmonary embolism. Main pulmonary artery is normal in caliber. Mediastinum: No evidence of mediastinal lymphadenopathy. The heart and pericardium demonstrate no acute abnormality. There is no aortic dissection seen. There is calcified plaque involving thoracic aorta. Lungs/pleura: There is mild dependent atelectasis at the lung bases. There is no acute infiltrate seen. There is no pleural effusion or pneumothorax. Upper Abdomen: Limited images of the upper abdomen demonstrate no acute abnormality. Status post interval cholecystectomy. Bones: There is a T6 marked compression deformity which is of unknown age. Compression deformity of L1 is unchanged. There is a healing fracture involving the left 12th rib. 1. No evidence for pulmonary embolism. 2. T6 marked compression deformity is of unknown age.   Compression deformity of L1 is unchanged from 2020. 3. There is a healing fracture involving the left 12th rib. RECOMMENDATIONS: Unavailable     US ABDOMEN LIMITED Specify organ? LIVER, GALLBLADDER    Result Date: 1/21/2022  EXAMINATION: RIGHT UPPER QUADRANT ULTRASOUND 1/21/2022 9:14 am COMPARISON: None. HISTORY: ORDERING SYSTEM PROVIDED HISTORY: elevated transaminase measurements TECHNOLOGIST PROVIDED HISTORY: Reason for exam:->elevated transaminase measurements Specify organ?->LIVER Specify organ?->GALLBLADDER What reading provider will be dictating this exam?->CRC FINDINGS: LIVER:  There is slight coarsened echotexture of the liver without evidence of focal lesion. There is subtle suggestion of nodular contour. BILIARY SYSTEM:  Status post cholecystectomy. Common bile duct measures approximately 3.2 mm in diameter RIGHT KIDNEY: The right kidney measures approximately 7.6 cm in length without evidence of hydronephrosis. There is cortical thinning and lobular contour of the right kidney. There is a cyst measuring approximately 1.3 cm in size. There is 2nd probable cyst measuring approximately 1 cm in size. PANCREAS:  Visualized portions of the pancreas are unremarkable. There appears to be mild dilatation of the pancreatic duct. 1. There appears to be dilatation of the pancreatic duct without ultrasound evidence of focal pancreatic lesion. Consider CT or MRI for further evaluation. 2. Status post cholecystectomy without biliary ductal dilatation. 3. Coarsened echotexture of the liver parenchyma with possible slight nodular contour but no evidence of focal lesion. MICROBIOLOGY:  BLOOD CX #1  No results for input(s): BC in the last 72 hours. BLOOD CX #2  No results for input(s): Viona Croak in the last 72 hours. CULTURE, RESPIRATORY   No results for input(s): CULTRESP in the last 72 hours. RESPIRATORY SMEAR  No results for input(s): RESPSMEAR in the last 72 hours.    STREP PNEUMONIA AG URINE  No results for input(s): STREPNEUMAGU in the last 72 hours. LEGIONELLA AG URINE  No results for input(s): LEGUR in the last 72 hours. No results for input(s): [address] in the last 72 hours. URINE CULTURE  Recent Labs     01/24/22  0950   LABURIN Growth not present     WOUND ABSCESS  No results for input(s): WNDABS in the last 72 hours. TIP CULTURE  No results for input(s): CXCATHTIP in the last 72 hours. BODY FLUID CULTURE  No results for input(s): BFCS in the last 72 hours. Anaerobic cx  No results for input(s): LABANAE in the last 72 hours. CULTURE SURGICAL  No results for input(s): CXSURG in the last 72 hours. MISC. SENDOUT  No results for input(s): MREF in the last 72 hours. Assessment/Plan  Sherry Herrmann is a 78y.o. year old female who presented on 1/21/2022 and is being treated for Sepsis secondary to UTI ([address] . Complete Problem List:    Patient Active Problem List    Diagnosis Date Noted    Elevated transaminase level 01/21/2022    Essential hypertension 09/17/2017    Hypothyroidism 09/17/2017    Sepsis secondary to UTI ([address] 01/21/2022    CKD (chronic kidney disease) stage 3, GFR 30-59 ml/min ([address] 01/21/2022    Pure hypercholesterolemia 10/22/2019    Cognitive dysfunction 10/22/2019    Moderate obesity 02/05/2018    Nonischemic cardiomyopathy ([address] 10/25/2017    Chronic combined systolic and diastolic HF (heart failure) ([address] 10/25/2017    History of TIA (transient ischemic attack)      Plan  · UTI/sepsis:   ? UA noted. Urine cx collected 1/24/22  ? Gentle IVF hydration  · MRSA/Pseudomonas Bacteremia  · ID consulted  · Antibiotics per them   · Follow cultures   · Elevated transaminase measurements-H/o choledocholithiasis:   ? H/o cholecystectomy   ? Check lipase - wnl    ? Right upper quadrant ultrasound - pancreatic duct dilatation   ? MRCP same   ?  Gi has evaluated the patient, agree with monitoring clinically for now   · DANIEL on CKD   · US neg for hydro   · Monitor BMP daily   · Stop IVFs today to prevent iatrogenic pulmonary edema   · Hypomagnesemia:   ? Replace and monitor  · Weakness/frequent falls:  ? PT/OT   ? [facility]: 16/24    · Continue home medications  · Follow labs  · DVT prophylaxis. · Please see orders for further management and care. ·  for discharge planning  · Discharge plan: DC today     Electronically signed by Weston Man MD on 1/27/2022 at 9:08 AM    I can be reached through [facility].

## 2022-01-27 NOTE — PROGRESS NOTES
PROGRESS NOTE        Patient Presents with/Seen in Consultation For      *moderate pancreatic duct dilation   CHIEF COMPLAINT: Shortness of breath, confusion, fatigue and fever    Subjective:     Patient seen Jaspal Pat in bed in no apparent distress. Denies abdominal pain or nausea. Tolerating diet. Review of Systems  Aside from what was mentioned in the PMH and HPI, essentially unremarkable, all others negative. Objective:     /62   Pulse 70   Temp 98 °F (36.7 °C) (Oral)   Resp 18   Ht 5' 5\" (1.651 m)   Wt 161 lb 14.4 oz (73.4 kg)   SpO2 98%   BMI 26.94 kg/m²     General appearance: alert, awake, laying in bed, and cooperative  Eyes: conjunctiva pale, sclera anicteric. PERRL.   Lungs: clear to auscultation bilaterally  Heart: regular rate and rhythm, no murmur, 2+ pulses; trace edema  Abdomen: soft, non-tender; bowel sounds normal; no masses,  no organomegaly  Extremities: extremities trace edema  Pulses: 2+ and symmetric  Skin: Skin color, texture, turgor normal.   Neurologic: Grossly normal    levoFLOXacin (LEVAQUIN) tablet 750 mg, Every Other Day  enoxaparin (LOVENOX) injection 30 mg, Daily  cefepime (MAXIPIME) 2000 mg IVPB minibag, Q12H  sodium chloride flush 0.9 % injection 10 mL, 2 times per day  sodium chloride flush 0.9 % injection 10 mL, PRN  0.9 % sodium chloride infusion, PRN  potassium chloride (KLOR-CON M) extended release tablet 40 mEq, PRN   Or  potassium bicarb-citric acid (EFFER-K) effervescent tablet 40 mEq, PRN   Or  potassium chloride 10 mEq/100 mL IVPB (Peripheral Line), PRN  ondansetron (ZOFRAN-ODT) disintegrating tablet 4 mg, Q8H PRN   Or  ondansetron (ZOFRAN) injection 4 mg, Q6H PRN  senna (SENOKOT) tablet 8.6 mg, Daily PRN  acetaminophen (TYLENOL) tablet 650 mg, Q6H PRN   Or  acetaminophen (TYLENOL) suppository 650 mg, Q6H PRN  traMADol (ULTRAM) tablet 100 mg, TID  aspirin chewable tablet 81 mg, Daily  Calcium Carb-Cholecalciferol 250-125 MG-UNIT TABS 250 mg, Daily  losartan (COZAAR) tablet 50 mg, Daily  levothyroxine (SYNTHROID) tablet 75 mcg, Daily  pantoprazole (PROTONIX) tablet 40 mg, Daily  atorvastatin (LIPITOR) tablet 40 mg, Nightly  topiramate (TOPAMAX) tablet 25 mg, Daily  metoprolol succinate (TOPROL XL) extended release tablet 25 mg, Daily  buPROPion (WELLBUTRIN XL) extended release tablet 150 mg, BID  busPIRone (BUSPAR) tablet 15 mg, BID  donepezil (ARICEPT) tablet 5 mg, Nightly  conjugated estrogens (PREMARIN) vaginal cream, Once per day on Mon Wed Fri  sucralfate (CARAFATE) tablet 1 g, 4 times per day  potassium chloride (KLOR-CON M) extended release tablet 20 mEq, Daily         Data Review  CBC:   Lab Results   Component Value Date    WBC 10.2 01/27/2022    RBC 3.61 01/27/2022    HGB 8.9 01/27/2022    HCT 28.6 01/27/2022    MCV 79.2 01/27/2022    MCH 24.7 01/27/2022    MCHC 31.1 01/27/2022    RDW 20.1 01/27/2022     01/27/2022    MPV 10.1 01/27/2022     CMP:    Lab Results   Component Value Date     01/27/2022    K 4.1 01/27/2022    K 4.4 01/23/2022     01/27/2022    CO2 25 01/27/2022    BUN 12 01/27/2022    CREATININE 1.4 01/27/2022    GFRAA 43 01/27/2022    LABGLOM 36 01/27/2022    GLUCOSE 84 01/27/2022    PROT 5.4 01/27/2022    LABALBU 2.9 01/27/2022    CALCIUM 9.4 01/27/2022    BILITOT 0.3 01/27/2022    ALKPHOS 169 01/27/2022    AST 18 01/27/2022    ALT 24 01/27/2022     Hepatic Function Panel:    Lab Results   Component Value Date    ALKPHOS 169 01/27/2022    ALT 24 01/27/2022    AST 18 01/27/2022    PROT 5.4 01/27/2022    BILITOT 0.3 01/27/2022    BILIDIR 0.4 01/21/2022    IBILI 0.4 01/21/2022    LABALBU 2.9 01/27/2022     No components found for: CHLPL  No results found for: TRIG    Lab Results   Component Value Date    HDL 34 01/25/2022       Lab Results   Component Value Date    LDLCALC 46 01/25/2022       Lab Results   Component Value Date    LABVLDL 21 01/25/2022      PT/INR:    Lab Results   Component Value Date    PROTIME 13.5 09/15/2020    INR 1.2 09/15/2020     IRON:    Lab Results   Component Value Date    IRON 54 01/24/2022     Iron Saturation:  No components found for: PERCENTFE  FERRITIN:    Lab Results   Component Value Date    FERRITIN 69 01/24/2022         Assessment:     Active Problems:  ? Pancreatic duct dilatation  ? Cholecystectomy 9/16/2020  ? History choledocholithiasis s/p ERCP 9/15/20  ? Fatty liver disease  ? Elevated LFT's- improving, medications/MRI reviewed  ? MRSA/Pseudomonas bacteremia- ID following   ? Elevated CA 19-9       Plan:   ? Serology negative thus far, CEA 4.4, AFP 1, CA 19-9 130  ? Diet as tolerated  ? Antibiotocs per ID  ? IVF per PCP  ? Will need endoscopic work up, to be arranged and done as outpatient  ? Will send/consult Dr. Nat Virgen for EUS as outpatient  ? Discharge per PCP, ok from [facility] with outpatient follow up visit         Note: This report was completed utilizing computer voice recognition software. Every effort has been made to ensure accuracy, however; inadvertent computerized transcription errors may be present. Discussed with Dr. Bri Rae per Dr. Mary Velásquez APRN-NP-C 1/27/2022  1:25 PM For Dr. Luis Everett 19-9 NOTED. WILL ARRANGE FOR EUS WITH DR. HEDRICK. WILL PLAN FOR ENDOSCOPIC WORK UP AS OUTPATIENT. WILL FOLLOW.

## 2022-01-27 NOTE — CARE COORDINATION
CASE MANAGEMENT. .. Discharge order on chart. [facility] to transport patient to [facility] 7:30pm tonight. Nursing, facility and patient updated. N 16 in epic. Forms/Hens in chart.

## 2022-01-28 NOTE — DISCHARGE SUMMARY
Internal Medicine Discharge Summary    NAME: Hema Moore :  1940  MRN:  [id number] Judi Darling MD    ADMITTED: 2022   DISCHARGED: 2022  7:43 PM    ADMITTING PHYSICIAN: Christal att. providers found    PCP: Chantal Campos MD    CONSULTANT(S):   IP CONSULT TO INTERNAL MEDICINE  IP CONSULT TO SOCIAL WORK  IP CONSULT TO INFECTIOUS DISEASES  IP CONSULT TO PHARMACY  IP CONSULT TO GI     ADMITTING DIAGNOSIS:   Sepsis secondary to UTI ([address] [A41.9, N39.0]  Acute respiratory failure with hypoxemia ([address] [J96.01]     Please see H&P for further details    DISCHARGE DIAGNOSES:   Active Hospital Problems    Diagnosis     Elevated transaminase level [R74.01]      Priority: Medium    Hypothyroidism [E03.9]      Priority: Low    Essential hypertension [I10]      Priority: Low    Sepsis secondary to UTI ([address] [A41.9, N39.0]     Pure hypercholesterolemia [E78.00]     Cognitive dysfunction [F09]     Moderate obesity [E66.8]     Nonischemic cardiomyopathy ([address] [I42.8]     Chronic combined systolic and diastolic HF (heart failure) ([address] [I50.42]     History of TIA (transient ischemic attack) [Z86.73]        BRIEF HISTORY OF PRESENT ILLNESS: Hema Moore is a 78 y.o. female patient of Chantal Campos MD who  has a past medical history of Anxiety, Cardiomyopathy ([address] CHF (congestive heart failure) ([address] CKD (chronic kidney disease) stage 3, GFR 30-59 ml/min ([address] Cognitive dysfunction, Depression, History of TIA (transient ischemic attack), Hyperlipidemia, Hypertension, and Thyroid disease. who originally had concerns including Fatigue, Altered Mental Status, Extremity Weakness, and Fever. at presentation on 2022, and was found to have Sepsis secondary to UTI ([address] [A41.9, N39.0]  Acute respiratory failure with hypoxemia ([address] [J96.01] after workup. Please see H&P for further details.     HOSPITAL COURSE:   The patient presented to the hospital with the chief complaint of Fatigue, Altered Mental Status, Extremity Weakness, and Fever  . The patient was admitted to the hospital.     · UTI/sepsis:   ? UA noted. Urine cx collected 1/24/22  ? Gentle IVF hydration  · MRSA/Pseudomonas Bacteremia  ? ID consulted  ? Antibiotics per them   ? Follow cultures   · Elevated transaminase measurements-H/o choledocholithiasis:   ? H/o cholecystectomy   ? Check lipase - wnl    ? Right upper quadrant ultrasound - pancreatic duct dilatation   ? MRCP same   ? Gi has evaluated the patient, agree with monitoring clinically for now   · DANIEL on CKD   ? US neg for hydro   ? Monitor BMP daily   ? Stop IVFs today to prevent iatrogenic pulmonary edema   · Hypomagnesemia:   ? Replace and monitor  · Weakness/frequent falls:  ? PT/OT   § [facility]: 16/24        BRIEF PHYSICAL EXAMINATION AND LABORATORIES ON DAY OF DISCHARGE:  VITALS:  BP 97/68   Pulse 65   Temp 98.2 °F (36.8 °C) (Oral)   Resp 18   Ht 5' 5\" (1.651 m)   Wt 161 lb 14.4 oz (73.4 kg)   SpO2 98%   BMI 26.94 kg/m²       Please see note from the same day.      LABS[de-identified]  Recent Labs     01/25/22 0343 01/26/22  0520 01/27/22  0245    138 139   K 4.6 4.1 4.1    104 105   CO2 25 24 25   BUN 16 14 12   CREATININE 1.4* 1.4* 1.4*   GLUCOSE 87 87 84   CALCIUM 9.2 9.3 9.4     Recent Labs     01/25/22  0343 01/26/22  0520 01/27/22  0245   ALKPHOS 188* 182* 169*   ALT 37* 31 24   AST 24 21 18   PROT 5.7* 5.6* 5.4*   BILITOT 0.3 0.3 0.3   LABALBU 2.9* 3.1* 2.9*     Recent Labs     01/25/22  0343 01/26/22  0520 01/27/22  0245   WBC 9.5 10.2 10.2   RBC 3.89 3.94 3.61   HGB 9.3* 9.4* 8.9*   HCT 30.9* 31.5* 28.6*   MCV 79.4* 79.9* 79.2*   MCH 23.9* 23.9* 24.7*   MCHC 30.1* 29.8* 31.1*   RDW 20.0* 20.2* 20.1*    260 267   MPV 10.2 10.4 10.1     No results found for: LABA1C  Lab Results   Component Value Date    INR 1.2 09/15/2020    INR 1.3 09/17/2017    PROTIME 13.5 (H) 09/15/2020    PROTIME 14.5 (H) 09/17/2017      Lab Results   Component Value Date    TSH 8.210 (H) 01/24/2022     Lab Results   Component Value Date    HDL 34 01/25/2022    LDLCALC 46 01/25/2022     No results for input(s): MG in the last 72 hours. No results for input(s): CKTOTAL, CKMB, TROPONINI in the last 72 hours. No results for input(s): LACTA in the last 72 hours. IMAGING:  XR CHEST PORTABLE    Result Date: 1/21/2022  EXAMINATION: ONE XRAY VIEW OF THE CHEST 1/21/2022 4:00 am COMPARISON: 09/18/2017 HISTORY: ORDERING SYSTEM PROVIDED HISTORY: eval for pneumonia TECHNOLOGIST PROVIDED HISTORY: Reason for exam:->eval for pneumonia FINDINGS: There is no cardiomegaly. There is no pulmonary vascular congestion or infiltrate. There is minimal subsegmental atelectasis in lower left lung. There is no pleural effusion. There is no pneumothorax. There is no significant abnormality seen. MRI ABDOMEN WO CONTRAST MRCP    Result Date: 1/24/2022  EXAMINATION: MRI OF THE ABDOMEN WITHOUT CONTRAST AND MRCP 1/23/2022 1:51 pm TECHNIQUE: Multiplanar multisequence MRI of the abdomen was performed without the administration of intravenous contrast. COMPARISON: Limited abdominal sonogram 01/21/2022, chest CTA 01/21/2022, ERCPs 10/21/2020 and 09/15/2020, abdomen and pelvis CTs 09/12/2020 and 09/07/2009 HISTORY: ORDERING SYSTEM PROVIDED HISTORY: pancreatic dilatation TECHNOLOGIST PROVIDED HISTORY: Reason for exam:->pancreatic dilatation FINDINGS: Lack of intravenous contrast administration, partially limiting evaluation of the soft tissues and vasculature. Patient motion in some areas, partially limiting evaluation of those areas. LOWER CHEST:  Trace bilateral pleural effusions with minimal passive atelectasis in the overlying bilateral lower lobes. Normal heart size. No pericardial effusion. LIVER:  Ill-defined and somewhat wedge-shaped T2 intermediate signal predominantly in segment 5 with possible involvement of adjacent segment 6. GALLBLADDER/BILE DUCTS:  Surgically absent gallbladder.   Minimal intrahepatic and extrahepatic biliary dilation with normal tapering of the distal common bile duct. No obvious ductal filling defects nor strictures. PANCREAS:  Typical duct configuration. Moderate dilation the downstream duct near the ampulla with mild upstream dilation in the neck and body. No obvious ductal filling defects nor strictures. Moderate parenchymal atrophy. SPLEEN:  Normal appearance. ADRENAL GLANDS:  Normal appearance. KIDNEYS:  Mild bilateral atrophy with multifocal parenchymal scarring. Simple parenchymal cysts in the lateral right interpolar region measuring up to 1.7 cm (Bosniak category 1). Left peripelvic cyst. GI/BOWEL:  Normal course and caliber of the stomach, small bowel, and colon without obstruction. PELVIS:  Partial visualization of the urinary bladder dome with no evident abnormality. PERITONEUM/RETROPERITONEUM:  No abdominal lymphadenopathy. No free intraperitoneal fluid. VESSELS:  Limited evaluation due to technique. SOFT TISSUES/BONES:  Mild subcutaneous edema. No suspicious marrow lesions. Multilevel degenerative disc disease. Mild thoracolumbar levoscoliosis. 1. Mild to moderate pancreatic duct dilation is most prominent near the ampulla and may be a sequela of moderate parenchymal atrophy. No obvious obstructing pancreatic or ampullary lesion is identified, and there is only minimal concomitant intrahepatic and extrahepatic biliary dilation that is likely related to cholecystectomy and/or age. 2. Ill-defined signal abnormality in hepatic segment 5 and possibly adjacent segment 6 with an inflammatory appearance, potentially for focal acute hepatitis given elevated liver enzymes with focal cholangitis an additional consideration. Of note, this is incompletely evaluated due to lack of intravenous contrast administration. 3. Mild anasarca and trace bilateral pleural effusions potentially due to volume overload.      CTA PULMONARY W CONTRAST    Result Date: 1/21/2022  EXAMINATION: CTA OF THE CHEST 1/21/2022 4:48 am TECHNIQUE: CTA of the chest was performed after the administration of intravenous contrast.  Multiplanar reformatted images are provided for review. MIP images are provided for review. Dose modulation, iterative reconstruction, and/or weight based adjustment of the mA/kV was utilized to reduce the radiation dose to as low as reasonably achievable. COMPARISON: CT abdomen of 09/12/2020 HISTORY: ORDERING SYSTEM PROVIDED HISTORY: eval for PE TECHNOLOGIST PROVIDED HISTORY: Reason for exam:->eval for PE Decision Support Exception - unselect if not a suspected or confirmed emergency medical condition->Emergency Medical Condition (MA) FINDINGS: Pulmonary Arteries: Pulmonary arteries are adequately opacified for evaluation. No evidence of intraluminal filling defect to suggest pulmonary embolism. Main pulmonary artery is normal in caliber. Mediastinum: No evidence of mediastinal lymphadenopathy. The heart and pericardium demonstrate no acute abnormality. There is no aortic dissection seen. There is calcified plaque involving thoracic aorta. Lungs/pleura: There is mild dependent atelectasis at the lung bases. There is no acute infiltrate seen. There is no pleural effusion or pneumothorax. Upper Abdomen: Limited images of the upper abdomen demonstrate no acute abnormality. Status post interval cholecystectomy. Bones: There is a T6 marked compression deformity which is of unknown age. Compression deformity of L1 is unchanged. There is a healing fracture involving the left 12th rib. 1. No evidence for pulmonary embolism. 2. T6 marked compression deformity is of unknown age. Compression deformity of L1 is unchanged from 2020. 3. There is a healing fracture involving the left 12th rib. RECOMMENDATIONS: Unavailable     US ABDOMEN LIMITED Specify organ? LIVER, GALLBLADDER    Result Date: 1/21/2022  EXAMINATION: RIGHT UPPER QUADRANT ULTRASOUND 1/21/2022 9:14 am COMPARISON: None.  HISTORY: ORDERING SYSTEM PROVIDED HISTORY: elevated transaminase measurements TECHNOLOGIST PROVIDED HISTORY: Reason for exam:->elevated transaminase measurements Specify organ?->LIVER Specify organ?->GALLBLADDER What reading provider will be dictating this exam?->CRC FINDINGS: LIVER:  There is slight coarsened echotexture of the liver without evidence of focal lesion. There is subtle suggestion of nodular contour. BILIARY SYSTEM:  Status post cholecystectomy. Common bile duct measures approximately 3.2 mm in diameter RIGHT KIDNEY: The right kidney measures approximately 7.6 cm in length without evidence of hydronephrosis. There is cortical thinning and lobular contour of the right kidney. There is a cyst measuring approximately 1.3 cm in size. There is 2nd probable cyst measuring approximately 1 cm in size. PANCREAS:  Visualized portions of the pancreas are unremarkable. There appears to be mild dilatation of the pancreatic duct. 1. There appears to be dilatation of the pancreatic duct without ultrasound evidence of focal pancreatic lesion. Consider CT or MRI for further evaluation. 2. Status post cholecystectomy without biliary ductal dilatation. 3. Coarsened echotexture of the liver parenchyma with possible slight nodular contour but no evidence of focal lesion. US RETROPERITONEAL COMPLETE    Result Date: 1/24/2022  EXAMINATION: RETROPERITONEAL ULTRASOUND OF THE KIDNEYS AND URINARY BLADDER 1/24/2022 COMPARISON: The previous abdominal ultrasound performed 01/21/2022. Correlation is made with MRI of the abdomen performed 01/23/2022. HISTORY: ORDERING SYSTEM PROVIDED HISTORY: freddy Head TECHNOLOGIST PROVIDED HISTORY: Reason for exam:->freddy manley What reading provider will be dictating this exam?->CRC FINDINGS: Kidneys: The right kidney measures 7.0 in length and the left kidney measures 7.1 in length. Kidneys demonstrate normal cortical echogenicity. The kidneys are again lobulated.   No evidence of hydronephrosis or intrarenal stones. Again seen is a mid to lower pole right renal cortical cyst, measuring 1.4 x 1.3 x 1.3 cm in size. A 1.0 cm cyst is noted involving the midpole of the left kidney and may be parapelvic. No perinephric fluid is identified bilaterally. Bladder: Unremarkable appearance of the visualized bladder. No significant post void both urinary bladder jets are not visualized. 1.  Atrophic kidneys again noted, when compared to the previous abdominal ultrasound performed 01/21/2022 and MRI of the abdomen performed 01/23/2022. 2.  Bilateral renal cysts. MICROBIOLOGY:  BLOOD CX #1  No results for input(s): BC in the last 72 hours. BLOOD CX #2  No results for input(s): Imani Si in the last 72 hours. TIP CULTURE  No results for input(s): CXCATHTIP in the last 72 hours. CULTURE, RESPIRATORY   No results for input(s): CULTRESP in the last 72 hours. RESPIRATORY SMEAR  No results for input(s): RESPSMEAR in the last 72 hours. ECHO:      DISPOSITION:  The patient's condition is fair.    The patient is being discharged to nursing home    DISCHARGE MEDICATIONS:      Medication List      START taking these medications    levoFLOXacin 750 MG tablet  Commonly known as: Levaquin  Take 1 tablet by mouth every 48 hours for 10 days Stop date 2/7/22  Start taking on: January 28, 2022        CHANGE how you take these medications    metoprolol succinate 25 MG extended release tablet  Commonly known as: TOPROL XL  TAKE 1 TABLET BY MOUTH  DAILY  What changed: additional instructions        CONTINUE taking these medications    aspirin 81 MG tablet     atorvastatin 40 MG tablet  Commonly known as: LIPITOR  TAKE 1 TABLET BY MOUTH  NIGHTLY     B-12 PO     busPIRone 15 MG tablet  Commonly known as: BUSPAR     CALCIUM-MAGNESIUM-ZINC PO     calcium-vitamin D 250-125 MG-UNIT per tablet  Commonly known as: OSCAL     donepezil 5 MG tablet  Commonly known as: ARICEPT     FLONASE NA     furosemide 40 MG tablet  Commonly known as: LASIX     levothyroxine 75 MCG tablet  Commonly known as: SYNTHROID     losartan 50 MG tablet  Commonly known as: COZAAR  Take 1 tablet by mouth daily     ondansetron 4 MG tablet  Commonly known as: ZOFRAN     pantoprazole 40 MG tablet  Commonly known as: PROTONIX     POTASSIMIN PO     Premarin 0.625 MG/GM vaginal cream  Generic drug: conjugated estrogens     sucralfate 1 GM tablet  Commonly known as: CARAFATE     topiramate 25 MG tablet  Commonly known as: TOPAMAX     traMADol 50 MG tablet  Commonly known as: ULTRAM     vitamin D 50 MCG (2000 UT) Caps capsule     Wellbutrin  MG extended release tablet  Generic drug: buPROPion     ZYRTEC PO           Where to Get Your Medications      You can get these medications from any pharmacy    Bring a paper prescription for each of these medications  · levoFLOXacin 750 MG tablet         Discharge Medication List as of 1/27/2022  4:37 PM        Discharge Medication List as of 1/27/2022  4:37 PM        Discharge Medication List as of 1/27/2022  4:37 PM      START taking these medications    Details   levoFLOXacin (LEVAQUIN) 750 MG tablet Take 1 tablet by mouth every 48 hours for 10 days Stop date 2/7/22, Disp-5 tablet, R-0Print             INTERNAL MEDICINE FOLLOW UP/INSTRUCTIONS:  · Follow-up with primary care physician within 1 week of discharge from hospital  · Please review changes to pre-hospital admission medications and prescriptions for new medications upon discharge from the hospital with PCP  · Please review results of labs and imaging studies with PCP  · Follow-up with consultants as directed by them   · If recurrence or worsening of symptoms please call primary care physician or return to the ER immediately  · Diet: ADULT DIET; Regular    Preparing for this patient's discharge, including paperwork, orders, instructions, and meeting with patient did required >35 minutes.     Electronically signed by Mitchell Martinez MD on 1/27/2022 at 8:09 PM

## 2022-02-09 ENCOUNTER — TELEPHONE (OUTPATIENT)
Dept: GASTROENTEROLOGY | Age: 82
End: 2022-02-09

## 2022-02-09 NOTE — TELEPHONE ENCOUNTER
Received referral from Dr [facility] office. Called and left message for patient to call back.

## 2022-10-06 NOTE — PROGRESS NOTES
Attempted to call pt to schedule her f/u, but it was the wrong number    Pharmacy Consultation Note  (Antibiotic Dosing and Monitoring)    Initial consult date: 1/23/22  Consulting physician/provider: Dr Angeles Chew  Drug: Vancomycin  Indication: Gram neg rods/Gram pos cocci in clusters bacteremia    Age/  Gender Height Weight IBW  Allergy Information   81 y.o./female 5' 5\" (165.1 cm) 174 lb (78.9 kg)     Ideal body weight: 57 kg (125 lb 10.6 oz)  Adjusted ideal body weight: 64.4 kg (142 lb)   Macrobid [nitrofurantoin monohyd macro], Macrobid [nitrofurantoin], Ciprofloxacin, Iodine, and Penicillins      Renal Function:  Recent Labs     01/22/22  0400 01/23/22  0230 01/24/22  0432   BUN 22 23 20   CREATININE 1.3* 1.3* 1.4*       Intake/Output Summary (Last 24 hours) at 1/24/2022 1522  Last data filed at 1/24/2022 1146  Gross per 24 hour   Intake 2495.13 ml   Output 50 ml   Net 2445.13 ml       Vancomycin Monitoring:  Trough:  No results for input(s): VANCOTROUGH in the last 72 hours. Random:  No results for input(s): VANCORANDOM in the last 72 hours. Vancomycin Administration Times:      Assessment:  · Patient is a 78 y.o. female who has been initiated on vancomycin  Estimated Creatinine Clearance: 31 mL/min (A) (based on SCr of 1.4 mg/dL (H)).   · To dose vancomycin, pharmacy will be utilizing bizsol calculation software for goal AUC/EDUARDO 400-600 mg/L-hr    Plan:  · Will order vancomycin 1000 mg IV every 24 hours  · Will check vancomycin levels when appropriate  · Will continue to monitor renal function   · Clinical pharmacy to follow    Tucker YeD, BCPS 1/24/2022 3:22 PM   Ext: [phone]

## 2023-11-10 ENCOUNTER — HOSPITAL ENCOUNTER (OUTPATIENT)
Age: 83
Discharge: HOME | End: 2023-11-12

## 2023-11-10 ENCOUNTER — HOSPITAL ENCOUNTER (OUTPATIENT)
Age: 83
End: 2023-11-10
Payer: MEDICARE

## 2023-11-10 LAB
ALBUMIN SERPL-MCNC: 3.3 G/DL (ref 3.5–5.2)
ALP SERPL-CCNC: 82 U/L (ref 35–104)
ALT SERPL-CCNC: 10 U/L (ref 0–32)
ANION GAP SERPL CALCULATED.3IONS-SCNC: 6 MMOL/L (ref 7–16)
AST SERPL-CCNC: 12 U/L (ref 0–31)
BILIRUB SERPL-MCNC: 0.3 MG/DL (ref 0–1.2)
BUN SERPL-MCNC: 25 MG/DL (ref 6–23)
CALCIUM SERPL-MCNC: 9.3 MG/DL (ref 8.6–10.2)
CHLORIDE SERPL-SCNC: 107 MMOL/L (ref 98–107)
CO2 SERPL-SCNC: 26 MMOL/L (ref 22–29)
CREAT SERPL-MCNC: 1.4 MG/DL (ref 0.5–1)
ERYTHROCYTE [DISTWIDTH] IN BLOOD BY AUTOMATED COUNT: 15.8 % (ref 11.5–15)
GFR SERPL CREATININE-BSD FRML MDRD: 37 ML/MIN/1.73M2
GLUCOSE SERPL-MCNC: 83 MG/DL (ref 74–99)
HCT VFR BLD AUTO: 34.9 % (ref 34–48)
HGB BLD-MCNC: 10.9 G/DL (ref 11.5–15.5)
MCH RBC QN AUTO: 27.9 PG (ref 26–35)
MCHC RBC AUTO-ENTMCNC: 31.2 G/DL (ref 32–34.5)
MCV RBC AUTO: 89.5 FL (ref 80–99.9)
PLATELET # BLD AUTO: 164 K/UL (ref 130–450)
PMV BLD AUTO: 10.6 FL (ref 7–12)
POTASSIUM SERPL-SCNC: 4.8 MMOL/L (ref 3.5–5)
PROT SERPL-MCNC: 5 G/DL (ref 6.4–8.3)
RBC # BLD AUTO: 3.9 M/UL (ref 3.5–5.5)
SODIUM SERPL-SCNC: 139 MMOL/L (ref 132–146)
WBC OTHER # BLD: 5.1 K/UL (ref 4.5–11.5)

## 2023-11-10 PROCEDURE — 80053 COMPREHEN METABOLIC PANEL: CPT

## 2023-11-10 PROCEDURE — 85027 COMPLETE CBC AUTOMATED: CPT

## 2023-11-10 PROCEDURE — 88342 IMHCHEM/IMCYTCHM 1ST ANTB: CPT

## 2023-11-10 PROCEDURE — 88305 TISSUE EXAM BY PATHOLOGIST: CPT

## 2023-11-15 LAB — SURGICAL PATHOLOGY REPORT: NORMAL

## 2023-11-25 ENCOUNTER — HOSPITAL ENCOUNTER (INPATIENT)
Age: 83
LOS: 3 days | Discharge: HOME | DRG: 871 | End: 2023-11-29
Attending: STUDENT IN AN ORGANIZED HEALTH CARE EDUCATION/TRAINING PROGRAM | Admitting: INTERNAL MEDICINE
Payer: MEDICARE

## 2023-11-25 ENCOUNTER — APPOINTMENT (OUTPATIENT)
Dept: GENERAL RADIOLOGY | Age: 83
DRG: 871 | End: 2023-11-25
Payer: MEDICARE

## 2023-11-25 DIAGNOSIS — N30.01 ACUTE CYSTITIS WITH HEMATURIA: ICD-10-CM

## 2023-11-25 DIAGNOSIS — J18.9 PNEUMONIA DUE TO INFECTIOUS ORGANISM, UNSPECIFIED LATERALITY, UNSPECIFIED PART OF LUNG: Primary | ICD-10-CM

## 2023-11-25 PROCEDURE — 99285 EMERGENCY DEPT VISIT HI MDM: CPT

## 2023-11-25 PROCEDURE — 93005 ELECTROCARDIOGRAM TRACING: CPT

## 2023-11-25 PROCEDURE — 06HY33Z INSERTION OF INFUSION DEVICE INTO LOWER VEIN, PERCUTANEOUS APPROACH: ICD-10-PCS | Performed by: INTERNAL MEDICINE

## 2023-11-25 PROCEDURE — 71045 X-RAY EXAM CHEST 1 VIEW: CPT

## 2023-11-25 RX ORDER — SODIUM CHLORIDE 9 MG/ML
INJECTION, SOLUTION INTRAVENOUS PRN
Status: DISCONTINUED | OUTPATIENT
Start: 2023-11-25 | End: 2023-11-26 | Stop reason: SDUPTHER

## 2023-11-25 RX ORDER — SODIUM CHLORIDE 0.9 % (FLUSH) 0.9 %
5-40 SYRINGE (ML) INJECTION EVERY 12 HOURS SCHEDULED
Status: DISCONTINUED | OUTPATIENT
Start: 2023-11-25 | End: 2023-11-29 | Stop reason: HOSPADM

## 2023-11-25 RX ORDER — ACETAMINOPHEN 325 MG/1
650 TABLET ORAL ONCE
Status: COMPLETED | OUTPATIENT
Start: 2023-11-25 | End: 2023-11-26

## 2023-11-25 RX ORDER — SODIUM CHLORIDE 0.9 % (FLUSH) 0.9 %
5-40 SYRINGE (ML) INJECTION PRN
Status: DISCONTINUED | OUTPATIENT
Start: 2023-11-25 | End: 2023-11-28

## 2023-11-25 RX ORDER — 0.9 % SODIUM CHLORIDE 0.9 %
500 INTRAVENOUS SOLUTION INTRAVENOUS ONCE
Status: COMPLETED | OUTPATIENT
Start: 2023-11-25 | End: 2023-11-26

## 2023-11-26 ENCOUNTER — APPOINTMENT (OUTPATIENT)
Dept: CT IMAGING | Age: 83
DRG: 871 | End: 2023-11-26
Payer: MEDICARE

## 2023-11-26 PROBLEM — N39.0 SEPSIS SECONDARY TO UTI (HCC): Status: RESOLVED | Noted: 2022-01-21 | Resolved: 2023-11-26

## 2023-11-26 PROBLEM — A41.9 SEPSIS SECONDARY TO UTI (HCC): Status: RESOLVED | Noted: 2022-01-21 | Resolved: 2023-11-26

## 2023-11-26 PROBLEM — A41.9 SEPSIS (HCC): Status: ACTIVE | Noted: 2023-11-26

## 2023-11-26 LAB
ALBUMIN SERPL-MCNC: 3.4 G/DL (ref 3.5–5.2)
ALP SERPL-CCNC: 158 U/L (ref 35–104)
ALT SERPL-CCNC: 30 U/L (ref 0–32)
AMMONIA PLAS-SCNC: 29 UMOL/L (ref 11–51)
AMPHET UR QL SCN: NEGATIVE
ANION GAP SERPL CALCULATED.3IONS-SCNC: 10 MMOL/L (ref 7–16)
APAP SERPL-MCNC: <5 UG/ML (ref 10–30)
AST SERPL-CCNC: 42 U/L (ref 0–31)
B PARAP IS1001 DNA NPH QL NAA+NON-PROBE: NOT DETECTED
B PERT DNA SPEC QL NAA+PROBE: NOT DETECTED
BACTERIA URNS QL MICRO: ABNORMAL
BARBITURATES UR QL SCN: NEGATIVE
BASOPHILS # BLD: 0 K/UL (ref 0–0.2)
BASOPHILS NFR BLD: 0 % (ref 0–2)
BENZODIAZ UR QL: NEGATIVE
BILIRUB SERPL-MCNC: 0.4 MG/DL (ref 0–1.2)
BILIRUB UR QL STRIP: NEGATIVE
BNP SERPL-MCNC: 4413 PG/ML (ref 0–450)
BUN SERPL-MCNC: 32 MG/DL (ref 6–23)
BUPRENORPHINE UR QL: NEGATIVE
C PNEUM DNA NPH QL NAA+NON-PROBE: NOT DETECTED
CALCIUM SERPL-MCNC: 8.9 MG/DL (ref 8.6–10.2)
CANNABINOIDS UR QL SCN: NEGATIVE
CHLORIDE SERPL-SCNC: 102 MMOL/L (ref 98–107)
CLARITY UR: ABNORMAL
CO2 SERPL-SCNC: 21 MMOL/L (ref 22–29)
COCAINE UR QL SCN: NEGATIVE
COLOR UR: YELLOW
CREAT SERPL-MCNC: 1.6 MG/DL (ref 0.5–1)
EOSINOPHIL # BLD: 0.11 K/UL (ref 0.05–0.5)
EOSINOPHILS RELATIVE PERCENT: 1 % (ref 0–6)
ERYTHROCYTE [DISTWIDTH] IN BLOOD BY AUTOMATED COUNT: 15.3 % (ref 11.5–15)
ETHANOLAMINE SERPL-MCNC: <10 MG/DL
FENTANYL UR QL: NEGATIVE
FLUAV RNA NPH QL NAA+NON-PROBE: NOT DETECTED
FLUBV RNA NPH QL NAA+NON-PROBE: NOT DETECTED
GFR SERPL CREATININE-BSD FRML MDRD: 31 ML/MIN/1.73M2
GLUCOSE SERPL-MCNC: 114 MG/DL (ref 74–99)
GLUCOSE UR STRIP-MCNC: NEGATIVE MG/DL
HADV DNA NPH QL NAA+NON-PROBE: NOT DETECTED
HCOV 229E RNA NPH QL NAA+NON-PROBE: NOT DETECTED
HCOV HKU1 RNA NPH QL NAA+NON-PROBE: NOT DETECTED
HCOV NL63 RNA NPH QL NAA+NON-PROBE: NOT DETECTED
HCOV OC43 RNA NPH QL NAA+NON-PROBE: NOT DETECTED
HCT VFR BLD AUTO: 37.2 % (ref 34–48)
HGB BLD-MCNC: 11.8 G/DL (ref 11.5–15.5)
HGB UR QL STRIP.AUTO: ABNORMAL
HMPV RNA NPH QL NAA+NON-PROBE: NOT DETECTED
HPIV1 RNA NPH QL NAA+NON-PROBE: NOT DETECTED
HPIV2 RNA NPH QL NAA+NON-PROBE: NOT DETECTED
HPIV3 RNA NPH QL NAA+NON-PROBE: NOT DETECTED
HPIV4 RNA NPH QL NAA+NON-PROBE: NOT DETECTED
KETONES UR STRIP-MCNC: NEGATIVE MG/DL
LACTATE BLDV-SCNC: 1.3 MMOL/L (ref 0.5–1.9)
LEUKOCYTE ESTERASE UR QL STRIP: ABNORMAL
LIPASE SERPL-CCNC: 13 U/L (ref 13–60)
LYMPHOCYTES NFR BLD: 0.55 K/UL (ref 1.5–4)
LYMPHOCYTES RELATIVE PERCENT: 4 % (ref 20–42)
M PNEUMO DNA NPH QL NAA+NON-PROBE: NOT DETECTED
MCH RBC QN AUTO: 27.9 PG (ref 26–35)
MCHC RBC AUTO-ENTMCNC: 31.7 G/DL (ref 32–34.5)
MCV RBC AUTO: 87.9 FL (ref 80–99.9)
METHADONE UR QL: NEGATIVE
MONOCYTES NFR BLD: 0.11 K/UL (ref 0.1–0.95)
MONOCYTES NFR BLD: 1 % (ref 2–12)
NEUTROPHILS NFR BLD: 94 % (ref 43–80)
NEUTS SEG NFR BLD: 11.83 K/UL (ref 1.8–7.3)
NITRITE UR QL STRIP: NEGATIVE
NUCLEATED RED BLOOD CELLS: 1 PER 100 WBC
OPIATES UR QL SCN: NEGATIVE
OXYCODONE UR QL SCN: NEGATIVE
PCP UR QL SCN: NEGATIVE
PH UR STRIP: 7 [PH] (ref 5–9)
PLATELET # BLD AUTO: 105 K/UL (ref 130–450)
PMV BLD AUTO: 10.7 FL (ref 7–12)
POTASSIUM SERPL-SCNC: 4.1 MMOL/L (ref 3.5–5)
PROCALCITONIN SERPL-MCNC: 7.45 NG/ML (ref 0–0.08)
PROT SERPL-MCNC: 5.6 G/DL (ref 6.4–8.3)
PROT UR STRIP-MCNC: 30 MG/DL
RBC # BLD AUTO: 4.23 M/UL (ref 3.5–5.5)
RBC # BLD: ABNORMAL 10*6/UL
RBC #/AREA URNS HPF: ABNORMAL /HPF
RSV RNA NPH QL NAA+NON-PROBE: NOT DETECTED
RV+EV RNA NPH QL NAA+NON-PROBE: NOT DETECTED
SALICYLATES SERPL-MCNC: <0.3 MG/DL (ref 0–30)
SARS-COV-2 RNA NPH QL NAA+NON-PROBE: NOT DETECTED
SODIUM SERPL-SCNC: 133 MMOL/L (ref 132–146)
SP GR UR STRIP: 1.01 (ref 1–1.03)
SPECIMEN DESCRIPTION: NORMAL
T4 FREE SERPL-MCNC: 1.4 NG/DL (ref 0.9–1.7)
TEST INFORMATION: NORMAL
TOXIC TRICYCLIC SC,BLOOD: NEGATIVE
TROPONIN I SERPL HS-MCNC: 164 NG/L (ref 0–9)
TROPONIN I SERPL HS-MCNC: 185 NG/L (ref 0–9)
TROPONIN I SERPL HS-MCNC: 188 NG/L (ref 0–9)
TSH SERPL DL<=0.05 MIU/L-ACNC: 3.73 UIU/ML (ref 0.27–4.2)
UROBILINOGEN UR STRIP-ACNC: 0.2 EU/DL (ref 0–1)
WBC #/AREA URNS HPF: ABNORMAL /HPF
WBC OTHER # BLD: 12.6 K/UL (ref 4.5–11.5)

## 2023-11-26 PROCEDURE — 84443 ASSAY THYROID STIM HORMONE: CPT

## 2023-11-26 PROCEDURE — 80143 DRUG ASSAY ACETAMINOPHEN: CPT

## 2023-11-26 PROCEDURE — 6370000000 HC RX 637 (ALT 250 FOR IP)

## 2023-11-26 PROCEDURE — 84145 PROCALCITONIN (PCT): CPT

## 2023-11-26 PROCEDURE — 70450 CT HEAD/BRAIN W/O DYE: CPT

## 2023-11-26 PROCEDURE — 82140 ASSAY OF AMMONIA: CPT

## 2023-11-26 PROCEDURE — 83880 ASSAY OF NATRIURETIC PEPTIDE: CPT

## 2023-11-26 PROCEDURE — 6360000002 HC RX W HCPCS

## 2023-11-26 PROCEDURE — 84484 ASSAY OF TROPONIN QUANT: CPT

## 2023-11-26 PROCEDURE — 83605 ASSAY OF LACTIC ACID: CPT

## 2023-11-26 PROCEDURE — 85025 COMPLETE CBC W/AUTO DIFF WBC: CPT

## 2023-11-26 PROCEDURE — 87088 URINE BACTERIA CULTURE: CPT

## 2023-11-26 PROCEDURE — G0480 DRUG TEST DEF 1-7 CLASSES: HCPCS

## 2023-11-26 PROCEDURE — 80179 DRUG ASSAY SALICYLATE: CPT

## 2023-11-26 PROCEDURE — 2000000000 HC ICU R&B

## 2023-11-26 PROCEDURE — 2580000003 HC RX 258

## 2023-11-26 PROCEDURE — 87081 CULTURE SCREEN ONLY: CPT

## 2023-11-26 PROCEDURE — 74176 CT ABD & PELVIS W/O CONTRAST: CPT

## 2023-11-26 PROCEDURE — 83690 ASSAY OF LIPASE: CPT

## 2023-11-26 PROCEDURE — 2580000003 HC RX 258: Performed by: STUDENT IN AN ORGANIZED HEALTH CARE EDUCATION/TRAINING PROGRAM

## 2023-11-26 PROCEDURE — 87899 AGENT NOS ASSAY W/OPTIC: CPT

## 2023-11-26 PROCEDURE — 87154 CUL TYP ID BLD PTHGN 6+ TRGT: CPT

## 2023-11-26 PROCEDURE — 2500000003 HC RX 250 WO HCPCS

## 2023-11-26 PROCEDURE — 6370000000 HC RX 637 (ALT 250 FOR IP): Performed by: NURSE PRACTITIONER

## 2023-11-26 PROCEDURE — 96361 HYDRATE IV INFUSION ADD-ON: CPT

## 2023-11-26 PROCEDURE — 2580000003 HC RX 258: Performed by: NURSE PRACTITIONER

## 2023-11-26 PROCEDURE — 93005 ELECTROCARDIOGRAM TRACING: CPT | Performed by: STUDENT IN AN ORGANIZED HEALTH CARE EDUCATION/TRAINING PROGRAM

## 2023-11-26 PROCEDURE — 80053 COMPREHEN METABOLIC PANEL: CPT

## 2023-11-26 PROCEDURE — 87449 NOS EACH ORGANISM AG IA: CPT

## 2023-11-26 PROCEDURE — 96365 THER/PROPH/DIAG IV INF INIT: CPT

## 2023-11-26 PROCEDURE — 6370000000 HC RX 637 (ALT 250 FOR IP): Performed by: INTERNAL MEDICINE

## 2023-11-26 PROCEDURE — 87040 BLOOD CULTURE FOR BACTERIA: CPT

## 2023-11-26 PROCEDURE — 0202U NFCT DS 22 TRGT SARS-COV-2: CPT

## 2023-11-26 PROCEDURE — 6360000002 HC RX W HCPCS: Performed by: INTERNAL MEDICINE

## 2023-11-26 PROCEDURE — 71250 CT THORAX DX C-: CPT

## 2023-11-26 PROCEDURE — 2700000000 HC OXYGEN THERAPY PER DAY

## 2023-11-26 PROCEDURE — 2500000003 HC RX 250 WO HCPCS: Performed by: STUDENT IN AN ORGANIZED HEALTH CARE EDUCATION/TRAINING PROGRAM

## 2023-11-26 PROCEDURE — 81001 URINALYSIS AUTO W/SCOPE: CPT

## 2023-11-26 PROCEDURE — 84439 ASSAY OF FREE THYROXINE: CPT

## 2023-11-26 PROCEDURE — 6360000002 HC RX W HCPCS: Performed by: NURSE PRACTITIONER

## 2023-11-26 PROCEDURE — 87086 URINE CULTURE/COLONY COUNT: CPT

## 2023-11-26 PROCEDURE — 80307 DRUG TEST PRSMV CHEM ANLYZR: CPT

## 2023-11-26 RX ORDER — SENNOSIDES A AND B 8.6 MG/1
1 TABLET, FILM COATED ORAL DAILY PRN
Status: DISCONTINUED | OUTPATIENT
Start: 2023-11-26 | End: 2023-11-29 | Stop reason: HOSPADM

## 2023-11-26 RX ORDER — ONDANSETRON 4 MG/1
4 TABLET, ORALLY DISINTEGRATING ORAL EVERY 8 HOURS PRN
Status: DISCONTINUED | OUTPATIENT
Start: 2023-11-26 | End: 2023-11-29 | Stop reason: HOSPADM

## 2023-11-26 RX ORDER — 0.9 % SODIUM CHLORIDE 0.9 %
1000 INTRAVENOUS SOLUTION INTRAVENOUS ONCE
Status: COMPLETED | OUTPATIENT
Start: 2023-11-26 | End: 2023-11-26

## 2023-11-26 RX ORDER — RIVASTIGMINE TARTRATE 1.5 MG/1
1.5 CAPSULE ORAL 2 TIMES DAILY
Status: DISCONTINUED | OUTPATIENT
Start: 2023-11-26 | End: 2023-11-29 | Stop reason: HOSPADM

## 2023-11-26 RX ORDER — BUSPIRONE HYDROCHLORIDE 5 MG/1
15 TABLET ORAL 2 TIMES DAILY
Status: DISCONTINUED | OUTPATIENT
Start: 2023-11-26 | End: 2023-11-29 | Stop reason: HOSPADM

## 2023-11-26 RX ORDER — DONEPEZIL HYDROCHLORIDE 10 MG/1
10 TABLET, FILM COATED ORAL NIGHTLY
Status: DISCONTINUED | OUTPATIENT
Start: 2023-11-26 | End: 2023-11-26

## 2023-11-26 RX ORDER — ASPIRIN 81 MG/1
81 TABLET, CHEWABLE ORAL DAILY
Status: DISCONTINUED | OUTPATIENT
Start: 2023-11-26 | End: 2023-11-29 | Stop reason: HOSPADM

## 2023-11-26 RX ORDER — ACETAMINOPHEN 650 MG/1
650 SUPPOSITORY RECTAL EVERY 6 HOURS PRN
Status: DISCONTINUED | OUTPATIENT
Start: 2023-11-26 | End: 2023-11-29 | Stop reason: HOSPADM

## 2023-11-26 RX ORDER — DIPHENOXYLATE HYDROCHLORIDE AND ATROPINE SULFATE 2.5; .025 MG/1; MG/1
1 TABLET ORAL DAILY
Status: DISCONTINUED | OUTPATIENT
Start: 2023-11-26 | End: 2023-11-29 | Stop reason: HOSPADM

## 2023-11-26 RX ORDER — 0.9 % SODIUM CHLORIDE 0.9 %
500 INTRAVENOUS SOLUTION INTRAVENOUS ONCE
Status: DISCONTINUED | OUTPATIENT
Start: 2023-11-26 | End: 2023-11-26

## 2023-11-26 RX ORDER — TRAMADOL HYDROCHLORIDE 50 MG/1
100 TABLET ORAL EVERY 6 HOURS
Status: DISCONTINUED | OUTPATIENT
Start: 2023-11-26 | End: 2023-11-29 | Stop reason: HOSPADM

## 2023-11-26 RX ORDER — SUCRALFATE 1 G/1
1 TABLET ORAL
Status: DISCONTINUED | OUTPATIENT
Start: 2023-11-26 | End: 2023-11-29 | Stop reason: HOSPADM

## 2023-11-26 RX ORDER — RIVASTIGMINE TARTRATE 1.5 MG/1
1.5 CAPSULE ORAL 2 TIMES DAILY
COMMUNITY

## 2023-11-26 RX ORDER — 0.9 % SODIUM CHLORIDE 0.9 %
250 INTRAVENOUS SOLUTION INTRAVENOUS ONCE
Status: DISCONTINUED | OUTPATIENT
Start: 2023-11-26 | End: 2023-11-26

## 2023-11-26 RX ORDER — LEVOTHYROXINE SODIUM 0.07 MG/1
75 TABLET ORAL DAILY
Status: DISCONTINUED | OUTPATIENT
Start: 2023-11-27 | End: 2023-11-29 | Stop reason: HOSPADM

## 2023-11-26 RX ORDER — SODIUM CHLORIDE 0.9 % (FLUSH) 0.9 %
10 SYRINGE (ML) INJECTION EVERY 12 HOURS SCHEDULED
Status: DISCONTINUED | OUTPATIENT
Start: 2023-11-26 | End: 2023-11-27

## 2023-11-26 RX ORDER — METOPROLOL SUCCINATE 25 MG/1
25 TABLET, EXTENDED RELEASE ORAL DAILY
Status: DISCONTINUED | OUTPATIENT
Start: 2023-11-26 | End: 2023-11-26

## 2023-11-26 RX ORDER — ATORVASTATIN CALCIUM 40 MG/1
40 TABLET, FILM COATED ORAL NIGHTLY
Status: DISCONTINUED | OUTPATIENT
Start: 2023-11-26 | End: 2023-11-29 | Stop reason: HOSPADM

## 2023-11-26 RX ORDER — ENOXAPARIN SODIUM 100 MG/ML
30 INJECTION SUBCUTANEOUS DAILY
Status: DISCONTINUED | OUTPATIENT
Start: 2023-11-26 | End: 2023-11-27

## 2023-11-26 RX ORDER — DIPHENOXYLATE HYDROCHLORIDE AND ATROPINE SULFATE 2.5; .025 MG/1; MG/1
1 TABLET ORAL DAILY
COMMUNITY

## 2023-11-26 RX ORDER — BUPROPION HYDROCHLORIDE 150 MG/1
150 TABLET ORAL EVERY 12 HOURS
Status: DISCONTINUED | OUTPATIENT
Start: 2023-11-26 | End: 2023-11-26

## 2023-11-26 RX ORDER — TRAMADOL HYDROCHLORIDE 50 MG/1
50 TABLET ORAL EVERY 6 HOURS
Status: DISCONTINUED | OUTPATIENT
Start: 2023-11-26 | End: 2023-11-26

## 2023-11-26 RX ORDER — SODIUM CHLORIDE 0.9 % (FLUSH) 0.9 %
10 SYRINGE (ML) INJECTION PRN
Status: DISCONTINUED | OUTPATIENT
Start: 2023-11-26 | End: 2023-11-29 | Stop reason: HOSPADM

## 2023-11-26 RX ORDER — POTASSIUM CHLORIDE 20 MEQ/1
40 TABLET, EXTENDED RELEASE ORAL PRN
Status: DISCONTINUED | OUTPATIENT
Start: 2023-11-26 | End: 2023-11-26

## 2023-11-26 RX ORDER — MAGNESIUM SULFATE IN WATER 40 MG/ML
2000 INJECTION, SOLUTION INTRAVENOUS PRN
Status: DISCONTINUED | OUTPATIENT
Start: 2023-11-26 | End: 2023-11-26

## 2023-11-26 RX ORDER — ENOXAPARIN SODIUM 100 MG/ML
40 INJECTION SUBCUTANEOUS DAILY
Status: DISCONTINUED | OUTPATIENT
Start: 2023-11-26 | End: 2023-11-26

## 2023-11-26 RX ORDER — TRAMADOL HYDROCHLORIDE 50 MG/1
100 TABLET ORAL EVERY 6 HOURS
Status: DISCONTINUED | OUTPATIENT
Start: 2023-11-26 | End: 2023-11-26

## 2023-11-26 RX ORDER — LOSARTAN POTASSIUM 50 MG/1
50 TABLET ORAL DAILY
Status: DISCONTINUED | OUTPATIENT
Start: 2023-11-26 | End: 2023-11-26

## 2023-11-26 RX ORDER — ACETAMINOPHEN 325 MG/1
650 TABLET ORAL EVERY 6 HOURS PRN
Status: DISCONTINUED | OUTPATIENT
Start: 2023-11-26 | End: 2023-11-29 | Stop reason: HOSPADM

## 2023-11-26 RX ORDER — POTASSIUM CHLORIDE 7.45 MG/ML
10 INJECTION INTRAVENOUS PRN
Status: DISCONTINUED | OUTPATIENT
Start: 2023-11-26 | End: 2023-11-26

## 2023-11-26 RX ORDER — BUPROPION HYDROCHLORIDE 100 MG/1
100 TABLET, EXTENDED RELEASE ORAL 2 TIMES DAILY
Status: DISCONTINUED | OUTPATIENT
Start: 2023-11-26 | End: 2023-11-29 | Stop reason: HOSPADM

## 2023-11-26 RX ORDER — PANTOPRAZOLE SODIUM 40 MG/1
40 TABLET, DELAYED RELEASE ORAL
Status: DISCONTINUED | OUTPATIENT
Start: 2023-11-26 | End: 2023-11-29 | Stop reason: HOSPADM

## 2023-11-26 RX ORDER — ONDANSETRON 2 MG/ML
4 INJECTION INTRAMUSCULAR; INTRAVENOUS EVERY 6 HOURS PRN
Status: DISCONTINUED | OUTPATIENT
Start: 2023-11-26 | End: 2023-11-29 | Stop reason: HOSPADM

## 2023-11-26 RX ORDER — SODIUM CHLORIDE 9 MG/ML
INJECTION, SOLUTION INTRAVENOUS PRN
Status: DISCONTINUED | OUTPATIENT
Start: 2023-11-26 | End: 2023-11-29 | Stop reason: HOSPADM

## 2023-11-26 RX ADMIN — CEFEPIME 2000 MG: 2 INJECTION, POWDER, FOR SOLUTION INTRAVENOUS at 02:16

## 2023-11-26 RX ADMIN — BUPROPION HYDROCHLORIDE 100 MG: 100 TABLET, EXTENDED RELEASE ORAL at 20:12

## 2023-11-26 RX ADMIN — SODIUM CHLORIDE 500 ML: 9 INJECTION, SOLUTION INTRAVENOUS at 00:00

## 2023-11-26 RX ADMIN — SUCRALFATE 1 G: 1 TABLET ORAL at 13:16

## 2023-11-26 RX ADMIN — SUCRALFATE 1 G: 1 TABLET ORAL at 19:57

## 2023-11-26 RX ADMIN — ENOXAPARIN SODIUM 30 MG: 100 INJECTION SUBCUTANEOUS at 13:15

## 2023-11-26 RX ADMIN — PANTOPRAZOLE SODIUM 40 MG: 40 TABLET, DELAYED RELEASE ORAL at 13:16

## 2023-11-26 RX ADMIN — CEFEPIME 1000 MG: 1 INJECTION, POWDER, FOR SOLUTION INTRAMUSCULAR; INTRAVENOUS at 15:02

## 2023-11-26 RX ADMIN — SODIUM CHLORIDE, PRESERVATIVE FREE 10 ML: 5 INJECTION INTRAVENOUS at 19:59

## 2023-11-26 RX ADMIN — BUSPIRONE HYDROCHLORIDE 15 MG: 5 TABLET ORAL at 19:56

## 2023-11-26 RX ADMIN — DOXYCYCLINE 100 MG: 100 INJECTION, POWDER, LYOPHILIZED, FOR SOLUTION INTRAVENOUS at 03:12

## 2023-11-26 RX ADMIN — ATORVASTATIN CALCIUM 40 MG: 40 TABLET, FILM COATED ORAL at 19:57

## 2023-11-26 RX ADMIN — RIVASTIGMINE TARTRATE 1.5 MG: 1.5 CAPSULE ORAL at 20:12

## 2023-11-26 RX ADMIN — SODIUM CHLORIDE, PRESERVATIVE FREE 10 ML: 5 INJECTION INTRAVENOUS at 19:57

## 2023-11-26 RX ADMIN — BUPROPION HYDROCHLORIDE 100 MG: 100 TABLET, EXTENDED RELEASE ORAL at 13:22

## 2023-11-26 RX ADMIN — TRAMADOL HYDROCHLORIDE 50 MG: 50 TABLET, COATED ORAL at 16:25

## 2023-11-26 RX ADMIN — ASPIRIN 81 MG CHEWABLE TABLET 81 MG: 81 TABLET CHEWABLE at 13:15

## 2023-11-26 RX ADMIN — TRAMADOL HYDROCHLORIDE 100 MG: 50 TABLET, COATED ORAL at 19:56

## 2023-11-26 RX ADMIN — SUCRALFATE 1 G: 1 TABLET ORAL at 16:25

## 2023-11-26 RX ADMIN — SODIUM CHLORIDE 1000 ML: 9 INJECTION, SOLUTION INTRAVENOUS at 04:28

## 2023-11-26 RX ADMIN — ACETAMINOPHEN 650 MG: 325 TABLET ORAL at 01:46

## 2023-11-26 RX ADMIN — SODIUM CHLORIDE 5 MCG/MIN: 9 INJECTION, SOLUTION INTRAVENOUS at 06:21

## 2023-11-26 ASSESSMENT — ENCOUNTER SYMPTOMS
RHINORRHEA: 0
SHORTNESS OF BREATH: 0
DIARRHEA: 0
ABDOMINAL PAIN: 0

## 2023-11-26 ASSESSMENT — PAIN - FUNCTIONAL ASSESSMENT: PAIN_FUNCTIONAL_ASSESSMENT: NONE - DENIES PAIN

## 2023-11-26 ASSESSMENT — PAIN SCALES - GENERAL
PAINLEVEL_OUTOF10: 0

## 2023-11-26 NOTE — H&P
History & Physicial  11/26/23  Primary Care:  Grecia Anton MD  [address] / [place] [address]        Chief Complaint   Patient presents with    Fatigue     Lives at home with daughter, per ems family states has been weak and more \"confused\" states at baseline pt is A/Ox2. HPI:  Patient is a 78year old female who presented to ER due to generalized weakness and confusion. Patient in ER was found to have pyuria and Right lower lobe infiltrate. Patient was given IVF but bp remained low. She was started on levophed and admitted to ICU. Patient this am laying in bed. She denies any current complaints. Nursing at bedside rechecking blood pressure. Prior to Visit Medications    Medication Sig Taking? Authorizing Provider   diphenoxylate-atropine (LOMOTIL) 2.5-0.025 MG per tablet Take 1 tablet by mouth daily. Every AM Max Daily Amount: 1 tablet Yes Celena Rosales MD   rivastigmine (EXELON) 1.5 MG capsule Take 1 capsule by mouth 2 times daily Yes Celena Rosales MD   buPROPion (WELLBUTRIN XL) 150 MG extended release tablet Take 1 tablet by mouth in the morning and 1 tablet in the evening. 1 tab every 12 hrs. Celena Rosales MD   busPIRone (BUSPAR) 15 MG tablet Take 15 mg by mouth in the morning and at bedtime Q12  Celena Rosales MD   donepezil (ARICEPT) 5 MG tablet Take 2 tablets by mouth nightly  Celena Rosales MD   PREMARIN 0.625 MG/GM vaginal cream APPLY 0.625MG VAGINALLY THREE TIMES A WEEK  Celena Rosales MD   ondansetron (ZOFRAN) 4 MG tablet TK 1 T PO TID  Celena Rosales MD   sucralfate (CARAFATE) 1 GM tablet Take 1 tablet 4 times a day by oral route.   Celena Rosales MD   metoprolol succinate (TOPROL XL) 25 MG extended release tablet TAKE 1 TABLET BY MOUTH  DAILY  Patient taking differently: Take 25 mg by mouth daily Instructed to take with sip water am of procedure  Clarence Euceda MD   furosemide (LASIX) 40 MG tablet Take 40 mg by mouth

## 2023-11-26 NOTE — ED NOTES
Pure wick became displaced from patient and bedding was soiled. Bed pad and linens changed. Pure wick reapplied to patient.       Laverna Cogan, RN  11/26/23 [id number]

## 2023-11-26 NOTE — ED NOTES
Patient incontinent of urine. Bed linen changed.  Purewick applied to patient      Fidel Bah RN  11/26/23 5436

## 2023-11-26 NOTE — CARE COORDINATION
Internal Medicine On-call Care Coordination Note    I was called by the ED physician because they recommended admission for this patient and we cover their PCP. The history as I understand it after discussion with the ED physician is as follows: Increased confusion, decreased oral intake, decreased activity  Requiring NC  Given IV cefepime and doxy  CXR developing infiltrate  UA positive  ED concerned for sepsis    I placed admission orders. Including:    IV cefepime and doxy    Dr. Percy Nieto, or our coverage will see the patient tomorrow for H&P.     Electronically signed by CRISTIAN Luque CNP on 11/26/2023 at 3:13 AM

## 2023-11-26 NOTE — ED PROVIDER NOTES
[address] [facility]        Pt Name: Shaina Whyte  MRN: [id number]  [address] [facility] 1940  Date of evaluation: 11/25/2023  Provider: Elan Jenkins MD  Attending Provider: Lee Garcia MD  PCP: Marsha Styles MD  Note Started: 11:15 PM EST 11/25/23    CHIEF COMPLAINT       Chief Complaint   Patient presents with    Fatigue     Lives at home with daughter, per ems family states has been weak and more \"confused\" states at baseline pt is A/Ox2. HISTORY OF PRESENT ILLNESS: 1 or more Elements   History From: EMS report        Shaina Whyte is a 78 y.o. female with a past medical history of past medical history of TIA, congestive heart failure, hypothyroidism, hypertension, and chronic kidney disease presenting with altered mental status. Patient is unable to provide history and history is obtained from EMS. They state that the daughter has seen the patient not acting out as her baseline, she has been slumped over, minimally active, decreased p.o. intake, and been drooling and more confused. At baseline, the patient is AAO x 2. Patient had 1 blood pressure en route to the hospital which was stable. No point-of-care sugar was tested. Nursing Notes were all reviewed and agreed with or any disagreements were addressed in the HPI. REVIEW OF EXTERNAL NOTE :       Last admission on 1/21/2022 for UTI sepsis. REVIEW OF SYSTEMS :           Positives and Pertinent negatives as per HPI.      SURGICAL HISTORY     Past Surgical History:   Procedure Laterality Date    CHOLECYSTECTOMY, LAPAROSCOPIC N/A 9/16/2020    LAPAROSCOPIC CHOLECYSTECTOMY performed by Humberto Norton MD at [facility] OR    ERCP N/A 9/15/2020    ERCP STENT INSERTION with BALLOON SWEEPING and PAPILLOTOMY performed by Latha Warner MD at [facility] ENDOSCOPY    ERCP N/A 10/21/2020    ERCP STENT REMOVAL performed by Latha Warner MD at [address] [place] [address]

## 2023-11-26 NOTE — ED NOTES
Patient was to be admitted inpatient on the 6th floor. While in the ER the patients blood pressure was not improving with fluids. A central line was placed and patient is being admitted to the ICU. Pt has remained alert and oriented through this RN's entire shift with no decrease in mentation.       Trinidad King RN  11/26/23 2430

## 2023-11-27 LAB
ALBUMIN SERPL-MCNC: 2.7 G/DL (ref 3.5–5.2)
ALP SERPL-CCNC: 107 U/L (ref 35–104)
ALT SERPL-CCNC: 29 U/L (ref 0–32)
ANION GAP SERPL CALCULATED.3IONS-SCNC: 8 MMOL/L (ref 7–16)
AST SERPL-CCNC: 26 U/L (ref 0–31)
BASOPHILS # BLD: 0 K/UL (ref 0–0.2)
BASOPHILS NFR BLD: 0 % (ref 0–2)
BILIRUB SERPL-MCNC: 0.2 MG/DL (ref 0–1.2)
BUN SERPL-MCNC: 30 MG/DL (ref 6–23)
CALCIUM SERPL-MCNC: 8.9 MG/DL (ref 8.6–10.2)
CHLORIDE SERPL-SCNC: 107 MMOL/L (ref 98–107)
CO2 SERPL-SCNC: 22 MMOL/L (ref 22–29)
CREAT SERPL-MCNC: 1.5 MG/DL (ref 0.5–1)
CRP SERPL HS-MCNC: 126 MG/L (ref 0–5)
EKG ATRIAL RATE: 99 BPM
EKG P AXIS: 87 DEGREES
EKG P-R INTERVAL: 206 MS
EKG Q-T INTERVAL: 374 MS
EKG QRS DURATION: 84 MS
EKG QTC CALCULATION (BAZETT): 479 MS
EKG R AXIS: -9 DEGREES
EKG T AXIS: 52 DEGREES
EKG VENTRICULAR RATE: 99 BPM
EOSINOPHIL # BLD: 0.39 K/UL (ref 0.05–0.5)
EOSINOPHILS RELATIVE PERCENT: 3 % (ref 0–6)
ERYTHROCYTE [DISTWIDTH] IN BLOOD BY AUTOMATED COUNT: 15.9 % (ref 11.5–15)
ERYTHROCYTE [SEDIMENTATION RATE] IN BLOOD BY WESTERGREN METHOD: 27 MM/HR (ref 0–20)
GFR SERPL CREATININE-BSD FRML MDRD: 33 ML/MIN/1.73M2
GLUCOSE SERPL-MCNC: 93 MG/DL (ref 74–99)
HCT VFR BLD AUTO: 29.4 % (ref 34–48)
HGB BLD-MCNC: 9.3 G/DL (ref 11.5–15.5)
L PNEUMO1 AG UR QL IA.RAPID: NEGATIVE
LYMPHOCYTES NFR BLD: 0.53 K/UL (ref 1.5–4)
LYMPHOCYTES RELATIVE PERCENT: 4 % (ref 20–42)
MAGNESIUM SERPL-MCNC: 1.7 MG/DL (ref 1.6–2.6)
MCH RBC QN AUTO: 28.2 PG (ref 26–35)
MCHC RBC AUTO-ENTMCNC: 31.6 G/DL (ref 32–34.5)
MCV RBC AUTO: 89.1 FL (ref 80–99.9)
MONOCYTES NFR BLD: 0.26 K/UL (ref 0.1–0.95)
MONOCYTES NFR BLD: 2 % (ref 2–12)
NEUTROPHILS NFR BLD: 92 % (ref 43–80)
NEUTS SEG NFR BLD: 13.82 K/UL (ref 1.8–7.3)
PHOSPHATE SERPL-MCNC: 2.3 MG/DL (ref 2.5–4.5)
PLATELET CONFIRMATION: NORMAL
PLATELET, FLUORESCENCE: 89 K/UL (ref 130–450)
PMV BLD AUTO: 10.8 FL (ref 7–12)
POTASSIUM SERPL-SCNC: 4.1 MMOL/L (ref 3.5–5)
PROT SERPL-MCNC: 4.7 G/DL (ref 6.4–8.3)
RBC # BLD AUTO: 3.3 M/UL (ref 3.5–5.5)
RBC # BLD: ABNORMAL 10*6/UL
S PNEUM AG SPEC QL: NEGATIVE
SODIUM SERPL-SCNC: 137 MMOL/L (ref 132–146)
SPECIMEN SOURCE: NORMAL
WBC # BLD: ABNORMAL 10*3/UL
WBC OTHER # BLD: 15 K/UL (ref 4.5–11.5)

## 2023-11-27 PROCEDURE — 6360000002 HC RX W HCPCS: Performed by: SPECIALIST

## 2023-11-27 PROCEDURE — 2580000003 HC RX 258: Performed by: NURSE PRACTITIONER

## 2023-11-27 PROCEDURE — 87040 BLOOD CULTURE FOR BACTERIA: CPT

## 2023-11-27 PROCEDURE — 2580000003 HC RX 258

## 2023-11-27 PROCEDURE — 2500000003 HC RX 250 WO HCPCS

## 2023-11-27 PROCEDURE — 80053 COMPREHEN METABOLIC PANEL: CPT

## 2023-11-27 PROCEDURE — 1200000000 HC SEMI PRIVATE

## 2023-11-27 PROCEDURE — 6370000000 HC RX 637 (ALT 250 FOR IP): Performed by: NURSE PRACTITIONER

## 2023-11-27 PROCEDURE — 93010 ELECTROCARDIOGRAM REPORT: CPT | Performed by: INTERNAL MEDICINE

## 2023-11-27 PROCEDURE — 84100 ASSAY OF PHOSPHORUS: CPT

## 2023-11-27 PROCEDURE — 6360000002 HC RX W HCPCS: Performed by: NURSE PRACTITIONER

## 2023-11-27 PROCEDURE — 83735 ASSAY OF MAGNESIUM: CPT

## 2023-11-27 PROCEDURE — 36592 COLLECT BLOOD FROM PICC: CPT

## 2023-11-27 PROCEDURE — 2580000003 HC RX 258: Performed by: SPECIALIST

## 2023-11-27 PROCEDURE — 6370000000 HC RX 637 (ALT 250 FOR IP): Performed by: INTERNAL MEDICINE

## 2023-11-27 PROCEDURE — 6360000002 HC RX W HCPCS: Performed by: INTERNAL MEDICINE

## 2023-11-27 PROCEDURE — 6370000000 HC RX 637 (ALT 250 FOR IP)

## 2023-11-27 PROCEDURE — 85652 RBC SED RATE AUTOMATED: CPT

## 2023-11-27 PROCEDURE — 86140 C-REACTIVE PROTEIN: CPT

## 2023-11-27 PROCEDURE — 85025 COMPLETE CBC W/AUTO DIFF WBC: CPT

## 2023-11-27 RX ORDER — MAGNESIUM SULFATE IN WATER 40 MG/ML
2000 INJECTION, SOLUTION INTRAVENOUS PRN
Status: DISCONTINUED | OUTPATIENT
Start: 2023-11-27 | End: 2023-11-27

## 2023-11-27 RX ORDER — CARBOXYMETHYLCELLULOSE SODIUM 5 MG/ML
1 SOLUTION/ DROPS OPHTHALMIC 4 TIMES DAILY
COMMUNITY

## 2023-11-27 RX ORDER — HEPARIN SODIUM 10000 [USP'U]/ML
5000 INJECTION, SOLUTION INTRAVENOUS; SUBCUTANEOUS EVERY 8 HOURS
Status: DISCONTINUED | OUTPATIENT
Start: 2023-11-27 | End: 2023-11-29 | Stop reason: HOSPADM

## 2023-11-27 RX ADMIN — BUPROPION HYDROCHLORIDE 100 MG: 100 TABLET, EXTENDED RELEASE ORAL at 08:32

## 2023-11-27 RX ADMIN — SODIUM CHLORIDE, PRESERVATIVE FREE 10 ML: 5 INJECTION INTRAVENOUS at 09:41

## 2023-11-27 RX ADMIN — ATORVASTATIN CALCIUM 40 MG: 40 TABLET, FILM COATED ORAL at 19:50

## 2023-11-27 RX ADMIN — TRAMADOL HYDROCHLORIDE 100 MG: 50 TABLET, COATED ORAL at 15:02

## 2023-11-27 RX ADMIN — RIVASTIGMINE TARTRATE 1.5 MG: 1.5 CAPSULE ORAL at 08:32

## 2023-11-27 RX ADMIN — BUSPIRONE HYDROCHLORIDE 15 MG: 5 TABLET ORAL at 19:50

## 2023-11-27 RX ADMIN — SUCRALFATE 1 G: 1 TABLET ORAL at 19:50

## 2023-11-27 RX ADMIN — SODIUM CHLORIDE, PRESERVATIVE FREE 10 ML: 5 INJECTION INTRAVENOUS at 14:49

## 2023-11-27 RX ADMIN — CEFEPIME 1000 MG: 1 INJECTION, POWDER, FOR SOLUTION INTRAMUSCULAR; INTRAVENOUS at 01:35

## 2023-11-27 RX ADMIN — CEFEPIME 2000 MG: 2 INJECTION, POWDER, FOR SOLUTION INTRAVENOUS at 14:53

## 2023-11-27 RX ADMIN — SODIUM CHLORIDE, PRESERVATIVE FREE 10 ML: 5 INJECTION INTRAVENOUS at 08:32

## 2023-11-27 RX ADMIN — TRAMADOL HYDROCHLORIDE 100 MG: 50 TABLET, COATED ORAL at 19:50

## 2023-11-27 RX ADMIN — RIVASTIGMINE TARTRATE 1.5 MG: 1.5 CAPSULE ORAL at 19:51

## 2023-11-27 RX ADMIN — DIPHENOXYLATE HYDROCHLORIDE AND ATROPINE SULFATE 1 TABLET: 2.5; .025 TABLET ORAL at 08:32

## 2023-11-27 RX ADMIN — BUPROPION HYDROCHLORIDE 100 MG: 100 TABLET, EXTENDED RELEASE ORAL at 19:51

## 2023-11-27 RX ADMIN — SODIUM CHLORIDE, PRESERVATIVE FREE 10 ML: 5 INJECTION INTRAVENOUS at 19:51

## 2023-11-27 RX ADMIN — SUCRALFATE 1 G: 1 TABLET ORAL at 12:11

## 2023-11-27 RX ADMIN — ASPIRIN 81 MG CHEWABLE TABLET 81 MG: 81 TABLET CHEWABLE at 08:31

## 2023-11-27 RX ADMIN — HEPARIN SODIUM 5000 UNITS: 10000 INJECTION INTRAVENOUS; SUBCUTANEOUS at 14:58

## 2023-11-27 RX ADMIN — BUSPIRONE HYDROCHLORIDE 15 MG: 5 TABLET ORAL at 08:32

## 2023-11-27 RX ADMIN — SODIUM PHOSPHATE, MONOBASIC, MONOHYDRATE AND SODIUM PHOSPHATE, DIBASIC, ANHYDROUS 15 MMOL: 142; 276 INJECTION, SOLUTION INTRAVENOUS at 09:40

## 2023-11-27 RX ADMIN — HEPARIN SODIUM 5000 UNITS: 10000 INJECTION INTRAVENOUS; SUBCUTANEOUS at 21:20

## 2023-11-27 RX ADMIN — TRAMADOL HYDROCHLORIDE 100 MG: 50 TABLET, COATED ORAL at 08:32

## 2023-11-27 RX ADMIN — SUCRALFATE 1 G: 1 TABLET ORAL at 06:43

## 2023-11-27 RX ADMIN — SUCRALFATE 1 G: 1 TABLET ORAL at 17:16

## 2023-11-27 RX ADMIN — LEVOTHYROXINE SODIUM 75 MCG: 75 TABLET ORAL at 06:43

## 2023-11-27 RX ADMIN — SODIUM CHLORIDE, PRESERVATIVE FREE 10 ML: 5 INJECTION INTRAVENOUS at 08:33

## 2023-11-27 RX ADMIN — PANTOPRAZOLE SODIUM 40 MG: 40 TABLET, DELAYED RELEASE ORAL at 06:43

## 2023-11-27 ASSESSMENT — PAIN SCALES - GENERAL
PAINLEVEL_OUTOF10: 0
PAINLEVEL_OUTOF10: 5
PAINLEVEL_OUTOF10: 0
PAINLEVEL_OUTOF10: 0
PAINLEVEL_OUTOF10: 4
PAINLEVEL_OUTOF10: 5

## 2023-11-27 ASSESSMENT — PAIN DESCRIPTION - PAIN TYPE: TYPE: CHRONIC PAIN

## 2023-11-27 NOTE — ACP (ADVANCE CARE PLANNING)
Advance Care Planning   Healthcare Decision Maker:    Primary Decision Maker: Laiani Padron  [phone]    Click here to complete Healthcare Decision Makers including selection of the Healthcare Decision Maker Relationship (ie \"Primary\").

## 2023-11-27 NOTE — CARE COORDINATION
Transition of Care- Met with patient and completed initial assessment. Patient lives with her daughter in a two story house with no steps to enter. PTA she uses a cane. She has a shower chair at home. Past stay at [facility]  Patient PCP is Dr. Mary Mayorga and pharmacy is [facility]. Patient does not anticipate any needs at discharge.     Lynn MASONN, RN  [facility]

## 2023-11-27 NOTE — PLAN OF CARE
Problem: Discharge Planning  Goal: Discharge to home or other facility with appropriate resources  Outcome: Progressing     Problem: Skin/Tissue Integrity  Goal: Absence of new skin breakdown  Description: 1. Monitor for areas of redness and/or skin breakdown  2. Assess vascular access sites hourly  3. Every 4-6 hours minimum:  Change oxygen saturation probe site  4. Every 4-6 hours:  If on nasal continuous positive airway pressure, respiratory therapy assess nares and determine need for appliance change or resting period.   Outcome: Progressing     Problem: Safety - Adult  Goal: Free from fall injury  Outcome: Progressing     Problem: ABCDS Injury Assessment  Goal: Absence of physical injury  Outcome: Progressing  Flowsheets (Taken 11/27/2023 0016)  Absence of Physical Injury: Implement safety measures based on patient assessment

## 2023-11-27 NOTE — PLAN OF CARE
Problem: Discharge Planning  Goal: Discharge to home or other facility with appropriate resources  11/27/2023 1151 by Varinder Conte RN  Outcome: Progressing  Flowsheets (Taken 11/27/2023 0800)  Discharge to home or other facility with appropriate resources: Identify barriers to discharge with patient and caregiver

## 2023-11-27 NOTE — PATIENT CARE CONFERENCE
Intensive Care Daily Quality Rounding Checklist      ICU Team Members: Dr. Alex Gardiner, DrsLashanda  [address] [facility], charge nurse, bedside nurse, respiratory therapist, clinical pharmacist    ICU Day #: NUMBER: 2    Intubation Date:  N/A    Ventilator Day #: N/A    Central Line Insertion Date: November 26        Day #: NUMBER: 2        Indication: CVCIndication: Hemodynamically unstable requiring volume resuscitation     Arterial Line Insertion Date: N/A      Day #: N/A    Temporary Hemodialysis Catheter Insertion Date: N/A      Day # N/A    DVT Prophylaxis: Heparin SQ    GI Prophylaxis: PO diet    Lamb Catheter Insertion Date:  External catheter       Day #: N/A      Indications: N/A      Continued need (if yes, reason documented and discussed with physician): N/A    Skin Issues/ Wounds and ordered treatment discussed on rounds: no issues    Goals/ Plans for the Day: Daily labs, remove CVC, advance diet and activity, transfer to floor, discharge planning    Reviewed plan and goals for day with patient and/or representative: yes, patient updated

## 2023-11-28 PROBLEM — R78.81 E COLI BACTEREMIA: Status: ACTIVE | Noted: 2023-11-28

## 2023-11-28 PROBLEM — B96.20 E COLI BACTEREMIA: Status: ACTIVE | Noted: 2023-11-28

## 2023-11-28 LAB
ALBUMIN SERPL-MCNC: 2.9 G/DL (ref 3.5–5.2)
ALP SERPL-CCNC: 118 U/L (ref 35–104)
ALT SERPL-CCNC: 23 U/L (ref 0–32)
ANION GAP SERPL CALCULATED.3IONS-SCNC: 9 MMOL/L (ref 7–16)
AST SERPL-CCNC: 17 U/L (ref 0–31)
BASOPHILS # BLD: 0.05 K/UL (ref 0–0.2)
BASOPHILS NFR BLD: 1 % (ref 0–2)
BILIRUB SERPL-MCNC: 0.3 MG/DL (ref 0–1.2)
BUN SERPL-MCNC: 22 MG/DL (ref 6–23)
CALCIUM SERPL-MCNC: 9.1 MG/DL (ref 8.6–10.2)
CHLORIDE SERPL-SCNC: 103 MMOL/L (ref 98–107)
CO2 SERPL-SCNC: 21 MMOL/L (ref 22–29)
CREAT SERPL-MCNC: 1.4 MG/DL (ref 0.5–1)
CRP SERPL HS-MCNC: 100 MG/L (ref 0–5)
EKG ATRIAL RATE: 86 BPM
EKG P AXIS: 33 DEGREES
EKG P-R INTERVAL: 170 MS
EKG Q-T INTERVAL: 426 MS
EKG QRS DURATION: 90 MS
EKG QTC CALCULATION (BAZETT): 509 MS
EKG R AXIS: -7 DEGREES
EKG T AXIS: 46 DEGREES
EKG VENTRICULAR RATE: 86 BPM
EOSINOPHIL # BLD: 0.38 K/UL (ref 0.05–0.5)
EOSINOPHILS RELATIVE PERCENT: 4 % (ref 0–6)
ERYTHROCYTE [DISTWIDTH] IN BLOOD BY AUTOMATED COUNT: 15.9 % (ref 11.5–15)
ERYTHROCYTE [SEDIMENTATION RATE] IN BLOOD BY WESTERGREN METHOD: 54 MM/HR (ref 0–20)
GFR SERPL CREATININE-BSD FRML MDRD: 38 ML/MIN/1.73M2
GLUCOSE SERPL-MCNC: 100 MG/DL (ref 74–99)
HCT VFR BLD AUTO: 30.8 % (ref 34–48)
HGB BLD-MCNC: 10 G/DL (ref 11.5–15.5)
IMM GRANULOCYTES # BLD AUTO: 0.08 K/UL (ref 0–0.58)
IMM GRANULOCYTES NFR BLD: 1 % (ref 0–5)
LYMPHOCYTES NFR BLD: 1.18 K/UL (ref 1.5–4)
LYMPHOCYTES RELATIVE PERCENT: 13 % (ref 20–42)
MAGNESIUM SERPL-MCNC: 1.7 MG/DL (ref 1.6–2.6)
MCH RBC QN AUTO: 28.2 PG (ref 26–35)
MCHC RBC AUTO-ENTMCNC: 32.5 G/DL (ref 32–34.5)
MCV RBC AUTO: 86.8 FL (ref 80–99.9)
MICROORGANISM SPEC CULT: ABNORMAL
MICROORGANISM SPEC CULT: NORMAL
MONOCYTES NFR BLD: 0.9 K/UL (ref 0.1–0.95)
MONOCYTES NFR BLD: 10 % (ref 2–12)
NEUTROPHILS NFR BLD: 72 % (ref 43–80)
NEUTS SEG NFR BLD: 6.68 K/UL (ref 1.8–7.3)
PHOSPHATE SERPL-MCNC: 2.7 MG/DL (ref 2.5–4.5)
PLATELET # BLD AUTO: 103 K/UL (ref 130–450)
PMV BLD AUTO: 10.7 FL (ref 7–12)
POTASSIUM SERPL-SCNC: 4.1 MMOL/L (ref 3.5–5)
PROCALCITONIN SERPL-MCNC: 10.27 NG/ML (ref 0–0.08)
PROT SERPL-MCNC: 5.1 G/DL (ref 6.4–8.3)
RBC # BLD AUTO: 3.55 M/UL (ref 3.5–5.5)
SODIUM SERPL-SCNC: 133 MMOL/L (ref 132–146)
SPECIMEN DESCRIPTION: ABNORMAL
SPECIMEN DESCRIPTION: NORMAL
WBC OTHER # BLD: 9.3 K/UL (ref 4.5–11.5)

## 2023-11-28 PROCEDURE — 2580000003 HC RX 258

## 2023-11-28 PROCEDURE — 2580000003 HC RX 258: Performed by: INTERNAL MEDICINE

## 2023-11-28 PROCEDURE — 6370000000 HC RX 637 (ALT 250 FOR IP): Performed by: INTERNAL MEDICINE

## 2023-11-28 PROCEDURE — 6360000002 HC RX W HCPCS: Performed by: INTERNAL MEDICINE

## 2023-11-28 PROCEDURE — 6370000000 HC RX 637 (ALT 250 FOR IP): Performed by: NURSE PRACTITIONER

## 2023-11-28 PROCEDURE — 1200000000 HC SEMI PRIVATE

## 2023-11-28 PROCEDURE — 93010 ELECTROCARDIOGRAM REPORT: CPT | Performed by: INTERNAL MEDICINE

## 2023-11-28 PROCEDURE — 80053 COMPREHEN METABOLIC PANEL: CPT

## 2023-11-28 PROCEDURE — 84100 ASSAY OF PHOSPHORUS: CPT

## 2023-11-28 PROCEDURE — 2500000003 HC RX 250 WO HCPCS

## 2023-11-28 PROCEDURE — 85652 RBC SED RATE AUTOMATED: CPT

## 2023-11-28 PROCEDURE — 85025 COMPLETE CBC W/AUTO DIFF WBC: CPT

## 2023-11-28 PROCEDURE — 6370000000 HC RX 637 (ALT 250 FOR IP)

## 2023-11-28 PROCEDURE — 2580000003 HC RX 258: Performed by: SPECIALIST

## 2023-11-28 PROCEDURE — 86140 C-REACTIVE PROTEIN: CPT

## 2023-11-28 PROCEDURE — 84145 PROCALCITONIN (PCT): CPT

## 2023-11-28 PROCEDURE — 6360000002 HC RX W HCPCS: Performed by: SPECIALIST

## 2023-11-28 PROCEDURE — 83735 ASSAY OF MAGNESIUM: CPT

## 2023-11-28 RX ORDER — LANOLIN ALCOHOL/MO/W.PET/CERES
3 CREAM (GRAM) TOPICAL NIGHTLY PRN
Status: DISCONTINUED | OUTPATIENT
Start: 2023-11-28 | End: 2023-11-29 | Stop reason: HOSPADM

## 2023-11-28 RX ADMIN — LEVOTHYROXINE SODIUM 75 MCG: 75 TABLET ORAL at 05:08

## 2023-11-28 RX ADMIN — TRAMADOL HYDROCHLORIDE 100 MG: 50 TABLET, COATED ORAL at 15:21

## 2023-11-28 RX ADMIN — SODIUM CHLORIDE, PRESERVATIVE FREE 10 ML: 5 INJECTION INTRAVENOUS at 21:23

## 2023-11-28 RX ADMIN — SUCRALFATE 1 G: 1 TABLET ORAL at 15:17

## 2023-11-28 RX ADMIN — BUSPIRONE HYDROCHLORIDE 15 MG: 5 TABLET ORAL at 21:22

## 2023-11-28 RX ADMIN — SODIUM PHOSPHATE, MONOBASIC, MONOHYDRATE AND SODIUM PHOSPHATE, DIBASIC, ANHYDROUS 15 MMOL: 142; 276 INJECTION, SOLUTION INTRAVENOUS at 05:57

## 2023-11-28 RX ADMIN — TRAMADOL HYDROCHLORIDE 100 MG: 50 TABLET, COATED ORAL at 10:15

## 2023-11-28 RX ADMIN — SUCRALFATE 1 G: 1 TABLET ORAL at 05:08

## 2023-11-28 RX ADMIN — DIPHENOXYLATE HYDROCHLORIDE AND ATROPINE SULFATE 1 TABLET: 2.5; .025 TABLET ORAL at 10:16

## 2023-11-28 RX ADMIN — HEPARIN SODIUM 5000 UNITS: 10000 INJECTION INTRAVENOUS; SUBCUTANEOUS at 05:08

## 2023-11-28 RX ADMIN — ASPIRIN 81 MG CHEWABLE TABLET 81 MG: 81 TABLET CHEWABLE at 10:16

## 2023-11-28 RX ADMIN — RIVASTIGMINE TARTRATE 1.5 MG: 1.5 CAPSULE ORAL at 21:22

## 2023-11-28 RX ADMIN — TRAMADOL HYDROCHLORIDE 100 MG: 50 TABLET, COATED ORAL at 02:27

## 2023-11-28 RX ADMIN — ATORVASTATIN CALCIUM 40 MG: 40 TABLET, FILM COATED ORAL at 21:22

## 2023-11-28 RX ADMIN — HEPARIN SODIUM 5000 UNITS: 10000 INJECTION INTRAVENOUS; SUBCUTANEOUS at 14:17

## 2023-11-28 RX ADMIN — WATER 2000 MG: 1 INJECTION INTRAMUSCULAR; INTRAVENOUS; SUBCUTANEOUS at 13:51

## 2023-11-28 RX ADMIN — BUPROPION HYDROCHLORIDE 100 MG: 100 TABLET, EXTENDED RELEASE ORAL at 21:23

## 2023-11-28 RX ADMIN — HEPARIN SODIUM 5000 UNITS: 10000 INJECTION INTRAVENOUS; SUBCUTANEOUS at 21:24

## 2023-11-28 RX ADMIN — SUCRALFATE 1 G: 1 TABLET ORAL at 21:22

## 2023-11-28 RX ADMIN — RIVASTIGMINE TARTRATE 1.5 MG: 1.5 CAPSULE ORAL at 10:16

## 2023-11-28 RX ADMIN — TRAMADOL HYDROCHLORIDE 100 MG: 50 TABLET, COATED ORAL at 21:23

## 2023-11-28 RX ADMIN — BUSPIRONE HYDROCHLORIDE 15 MG: 5 TABLET ORAL at 10:16

## 2023-11-28 RX ADMIN — SUCRALFATE 1 G: 1 TABLET ORAL at 10:17

## 2023-11-28 RX ADMIN — PANTOPRAZOLE SODIUM 40 MG: 40 TABLET, DELAYED RELEASE ORAL at 05:08

## 2023-11-28 RX ADMIN — BUPROPION HYDROCHLORIDE 100 MG: 100 TABLET, EXTENDED RELEASE ORAL at 10:16

## 2023-11-28 ASSESSMENT — PAIN SCALES - GENERAL: PAINLEVEL_OUTOF10: 0

## 2023-11-28 NOTE — CARE COORDINATION
Transition of Care-ID following for complicated UTI, waiting on final cultures, remains on Cefepime, dose increased today. Discharge plan is home with daughter, no expressed needs at this time.     Denis MASONN, RN  [facility]

## 2023-11-28 NOTE — PLAN OF CARE
Problem: Discharge Planning  Goal: Discharge to home or other facility with appropriate resources  11/28/2023 0058 by Danna Fofana RN  Outcome: Progressing  11/27/2023 1151 by Reena Goel RN  Outcome: Progressing  Flowsheets (Taken 11/27/2023 0800)  Discharge to home or other facility with appropriate resources: Identify barriers to discharge with patient and caregiver     Problem: Skin/Tissue Integrity  Goal: Absence of new skin breakdown  Description: 1. Monitor for areas of redness and/or skin breakdown  2. Assess vascular access sites hourly  3. Every 4-6 hours minimum:  Change oxygen saturation probe site  4. Every 4-6 hours:  If on nasal continuous positive airway pressure, respiratory therapy assess nares and determine need for appliance change or resting period.   Outcome: Progressing     Problem: Safety - Adult  Goal: Free from fall injury  Outcome: Progressing     Problem: ABCDS Injury Assessment  Goal: Absence of physical injury  Outcome: Progressing     Problem: Chronic Conditions and Co-morbidities  Goal: Patient's chronic conditions and co-morbidity symptoms are monitored and maintained or improved  Outcome: Progressing     Problem: Pain  Goal: Verbalizes/displays adequate comfort level or baseline comfort level  Outcome: Progressing

## 2023-11-29 VITALS
DIASTOLIC BLOOD PRESSURE: 83 MMHG | TEMPERATURE: 98.8 F | WEIGHT: 157.63 LBS | OXYGEN SATURATION: 99 % | RESPIRATION RATE: 16 BRPM | BODY MASS INDEX: 26.91 KG/M2 | SYSTOLIC BLOOD PRESSURE: 144 MMHG | HEART RATE: 92 BPM | HEIGHT: 64 IN

## 2023-11-29 LAB
ACB COMPLEX DNA BLD POS QL NAA+NON-PROBE: NOT DETECTED
ALBUMIN SERPL-MCNC: 2.9 G/DL (ref 3.5–5.2)
ALP SERPL-CCNC: 105 U/L (ref 35–104)
ALT SERPL-CCNC: 18 U/L (ref 0–32)
ANION GAP SERPL CALCULATED.3IONS-SCNC: 10 MMOL/L (ref 7–16)
AST SERPL-CCNC: 14 U/L (ref 0–31)
B FRAGILIS DNA BLD POS QL NAA+NON-PROBE: NOT DETECTED
BASOPHILS # BLD: 0.05 K/UL (ref 0–0.2)
BASOPHILS NFR BLD: 1 % (ref 0–2)
BILIRUB SERPL-MCNC: 0.2 MG/DL (ref 0–1.2)
BIOFIRE TEST COMMENT: ABNORMAL
BLACTX-M ISLT/SPM QL: NOT DETECTED
BLAIMP ISLT/SPM QL: NOT DETECTED
BLAKPC ISLT/SPM QL: NOT DETECTED
BLAOXA-48-LIKE ISLT/SPM QL: NOT DETECTED
BLAVIM ISLT/SPM QL: NOT DETECTED
BUN SERPL-MCNC: 17 MG/DL (ref 6–23)
C ALBICANS DNA BLD POS QL NAA+NON-PROBE: NOT DETECTED
C AURIS DNA BLD POS QL NAA+NON-PROBE: NOT DETECTED
C GATTII+NEOFOR DNA BLD POS QL NAA+N-PRB: NOT DETECTED
C GLABRATA DNA BLD POS QL NAA+NON-PROBE: NOT DETECTED
C KRUSEI DNA BLD POS QL NAA+NON-PROBE: NOT DETECTED
C PARAP DNA BLD POS QL NAA+NON-PROBE: NOT DETECTED
C TROPICLS DNA BLD POS QL NAA+NON-PROBE: NOT DETECTED
CALCIUM SERPL-MCNC: 9.2 MG/DL (ref 8.6–10.2)
CHLORIDE SERPL-SCNC: 106 MMOL/L (ref 98–107)
CO2 SERPL-SCNC: 23 MMOL/L (ref 22–29)
COLISTIN RES MCR-1 ISLT/SPM QL: NOT DETECTED
CREAT SERPL-MCNC: 1.2 MG/DL (ref 0.5–1)
E CLOAC COMP DNA BLD POS NAA+NON-PROBE: NOT DETECTED
E COLI DNA BLD POS QL NAA+NON-PROBE: DETECTED
E FAECALIS DNA BLD POS QL NAA+NON-PROBE: NOT DETECTED
E FAECIUM DNA BLD POS QL NAA+NON-PROBE: NOT DETECTED
ENTEROBACTERALES DNA BLD POS NAA+N-PRB: DETECTED
EOSINOPHIL # BLD: 0.33 K/UL (ref 0.05–0.5)
EOSINOPHILS RELATIVE PERCENT: 4 % (ref 0–6)
ERYTHROCYTE [DISTWIDTH] IN BLOOD BY AUTOMATED COUNT: 15.9 % (ref 11.5–15)
GFR SERPL CREATININE-BSD FRML MDRD: 44 ML/MIN/1.73M2
GLUCOSE SERPL-MCNC: 103 MG/DL (ref 74–99)
GP B STREP DNA BLD POS QL NAA+NON-PROBE: NOT DETECTED
HAEM INFLU DNA BLD POS QL NAA+NON-PROBE: NOT DETECTED
HCT VFR BLD AUTO: 32.2 % (ref 34–48)
HGB BLD-MCNC: 10.2 G/DL (ref 11.5–15.5)
IMM GRANULOCYTES # BLD AUTO: 0.17 K/UL (ref 0–0.58)
IMM GRANULOCYTES NFR BLD: 2 % (ref 0–5)
K OXYTOCA DNA BLD POS QL NAA+NON-PROBE: NOT DETECTED
KLEBSIELLA SP DNA BLD POS QL NAA+NON-PRB: NOT DETECTED
KLEBSIELLA SP DNA BLD POS QL NAA+NON-PRB: NOT DETECTED
L MONOCYTOG DNA BLD POS QL NAA+NON-PROBE: NOT DETECTED
LYMPHOCYTES NFR BLD: 1.56 K/UL (ref 1.5–4)
LYMPHOCYTES RELATIVE PERCENT: 19 % (ref 20–42)
MAGNESIUM SERPL-MCNC: 1.7 MG/DL (ref 1.6–2.6)
MCH RBC QN AUTO: 27.4 PG (ref 26–35)
MCHC RBC AUTO-ENTMCNC: 31.7 G/DL (ref 32–34.5)
MCV RBC AUTO: 86.6 FL (ref 80–99.9)
MICROORGANISM SPEC CULT: ABNORMAL
MICROORGANISM SPEC CULT: ABNORMAL
MICROORGANISM/AGENT SPEC: ABNORMAL
MICROORGANISM/AGENT SPEC: ABNORMAL
MONOCYTES NFR BLD: 1.33 K/UL (ref 0.1–0.95)
MONOCYTES NFR BLD: 16 % (ref 2–12)
N MEN DNA BLD POS QL NAA+NON-PROBE: NOT DETECTED
NEUTROPHILS NFR BLD: 58 % (ref 43–80)
NEUTS SEG NFR BLD: 4.66 K/UL (ref 1.8–7.3)
P AERUGINOSA DNA BLD POS NAA+NON-PROBE: NOT DETECTED
PHOSPHATE SERPL-MCNC: 3.2 MG/DL (ref 2.5–4.5)
PLATELET # BLD AUTO: 127 K/UL (ref 130–450)
PMV BLD AUTO: 10.5 FL (ref 7–12)
POTASSIUM SERPL-SCNC: 4.1 MMOL/L (ref 3.5–5)
PROT SERPL-MCNC: 5.2 G/DL (ref 6.4–8.3)
PROTEUS SP DNA BLD POS QL NAA+NON-PROBE: NOT DETECTED
RBC # BLD AUTO: 3.72 M/UL (ref 3.5–5.5)
RESISTANT GENE NDM BY PCR: NOT DETECTED
S AUREUS DNA BLD POS QL NAA+NON-PROBE: NOT DETECTED
S AUREUS+CONS DNA BLD POS NAA+NON-PROBE: NOT DETECTED
S EPIDERMIDIS DNA BLD POS QL NAA+NON-PRB: NOT DETECTED
S LUGDUNENSIS DNA BLD POS QL NAA+NON-PRB: NOT DETECTED
S MALTOPHILIA DNA BLD POS QL NAA+NON-PRB: NOT DETECTED
S MARCESCENS DNA BLD POS NAA+NON-PROBE: NOT DETECTED
S PNEUM DNA BLD POS QL NAA+NON-PROBE: NOT DETECTED
S PYO DNA BLD POS QL NAA+NON-PROBE: NOT DETECTED
SALMONELLA DNA BLD POS QL NAA+NON-PROBE: NOT DETECTED
SERVICE CMNT-IMP: ABNORMAL
SERVICE CMNT-IMP: ABNORMAL
SODIUM SERPL-SCNC: 139 MMOL/L (ref 132–146)
SPECIMEN DESCRIPTION: ABNORMAL
SPECIMEN DESCRIPTION: ABNORMAL
STREPTOCOCCUS DNA BLD POS NAA+NON-PROBE: NOT DETECTED
WBC OTHER # BLD: 8.1 K/UL (ref 4.5–11.5)

## 2023-11-29 PROCEDURE — 6370000000 HC RX 637 (ALT 250 FOR IP): Performed by: INTERNAL MEDICINE

## 2023-11-29 PROCEDURE — 84100 ASSAY OF PHOSPHORUS: CPT

## 2023-11-29 PROCEDURE — 97535 SELF CARE MNGMENT TRAINING: CPT

## 2023-11-29 PROCEDURE — 6360000002 HC RX W HCPCS: Performed by: INTERNAL MEDICINE

## 2023-11-29 PROCEDURE — 83735 ASSAY OF MAGNESIUM: CPT

## 2023-11-29 PROCEDURE — 97165 OT EVAL LOW COMPLEX 30 MIN: CPT

## 2023-11-29 PROCEDURE — 97161 PT EVAL LOW COMPLEX 20 MIN: CPT

## 2023-11-29 PROCEDURE — 80053 COMPREHEN METABOLIC PANEL: CPT

## 2023-11-29 PROCEDURE — 6370000000 HC RX 637 (ALT 250 FOR IP): Performed by: SPECIALIST

## 2023-11-29 PROCEDURE — 85025 COMPLETE CBC W/AUTO DIFF WBC: CPT

## 2023-11-29 RX ORDER — CEFDINIR 300 MG/1
300 CAPSULE ORAL EVERY 12 HOURS SCHEDULED
Qty: 14 CAPSULE | Refills: 0 | Status: SHIPPED | OUTPATIENT
Start: 2023-11-29 | End: 2023-12-06

## 2023-11-29 RX ORDER — CEFDINIR 300 MG/1
300 CAPSULE ORAL EVERY 12 HOURS SCHEDULED
Status: DISCONTINUED | OUTPATIENT
Start: 2023-11-29 | End: 2023-11-29 | Stop reason: HOSPADM

## 2023-11-29 RX ADMIN — LEVOTHYROXINE SODIUM 75 MCG: 75 TABLET ORAL at 06:43

## 2023-11-29 RX ADMIN — DIPHENOXYLATE HYDROCHLORIDE AND ATROPINE SULFATE 1 TABLET: 2.5; .025 TABLET ORAL at 08:58

## 2023-11-29 RX ADMIN — TRAMADOL HYDROCHLORIDE 100 MG: 50 TABLET, COATED ORAL at 03:45

## 2023-11-29 RX ADMIN — HEPARIN SODIUM 5000 UNITS: 10000 INJECTION INTRAVENOUS; SUBCUTANEOUS at 06:43

## 2023-11-29 RX ADMIN — BUPROPION HYDROCHLORIDE 100 MG: 100 TABLET, EXTENDED RELEASE ORAL at 08:58

## 2023-11-29 RX ADMIN — TRAMADOL HYDROCHLORIDE 100 MG: 50 TABLET, COATED ORAL at 08:58

## 2023-11-29 RX ADMIN — SUCRALFATE 1 G: 1 TABLET ORAL at 12:11

## 2023-11-29 RX ADMIN — SUCRALFATE 1 G: 1 TABLET ORAL at 06:43

## 2023-11-29 RX ADMIN — BUSPIRONE HYDROCHLORIDE 15 MG: 5 TABLET ORAL at 08:58

## 2023-11-29 RX ADMIN — CEFDINIR 300 MG: 300 CAPSULE ORAL at 12:00

## 2023-11-29 RX ADMIN — RIVASTIGMINE TARTRATE 1.5 MG: 1.5 CAPSULE ORAL at 08:57

## 2023-11-29 RX ADMIN — ASPIRIN 81 MG CHEWABLE TABLET 81 MG: 81 TABLET CHEWABLE at 08:58

## 2023-11-29 RX ADMIN — PANTOPRAZOLE SODIUM 40 MG: 40 TABLET, DELAYED RELEASE ORAL at 06:43

## 2023-11-29 ASSESSMENT — PAIN SCALES - GENERAL
PAINLEVEL_OUTOF10: 0
PAINLEVEL_OUTOF10: 0

## 2023-11-29 NOTE — DISCHARGE SUMMARY
Internal Medicine Discharge Summary    NAME: Raza Samano :  1940  MRN:  [id number] Shanda Gutiérrez MD    ADMITTED: 2023   DISCHARGED: 2023  2:00 PM    ADMITTING PHYSICIAN: Epifanio    PCP: Farhad Rosenberg MD    CONSULTANT(S):   IP CONSULT TO INTERNAL MEDICINE  IP CONSULT TO CRITICAL CARE  IP CONSULT TO INFECTIOUS DISEASES  IP CONSULT TO HOME CARE NEEDS     ADMITTING DIAGNOSIS:   Acute cystitis with hematuria [N30.01]  Sepsis ([address]) [A41.9]  Pneumonia due to infectious organism, unspecified laterality, unspecified part of lung [J18.9]     Please see H&P for further details    DISCHARGE DIAGNOSES:   Active Hospital Problems    Diagnosis     E coli bacteremia [R78.81, B96.20]      Priority: High    Sepsis ([address]) [A41.9]      Priority: Medium    CKD (chronic kidney disease) stage 3, GFR 30-59 ml/min ([address]) [N18.30]     Cognitive dysfunction [F09]     Moderate obesity [E66.8]     Chronic combined systolic and diastolic HF (heart failure) ([address]) [I50.42]     Nonischemic cardiomyopathy ([address]) [I42.8]     History of TIA (transient ischemic attack) [Z86.73]     Essential hypertension [I10]     Hypothyroidism [E03.9]        BRIEF HISTORY OF PRESENT ILLNESS: Raza Samano is a 78 y.o. female patient of Farhad Rosenberg MD who  has a past medical history of Anxiety, Cardiomyopathy ([address]), CHF (congestive heart failure) ([address]), CKD (chronic kidney disease) stage 3, GFR 30-59 ml/min ([address]), Cognitive dysfunction, Depression, History of TIA (transient ischemic attack), Hyperlipidemia, Hypertension, and Thyroid disease. who originally had concerns including Fatigue (Lives at home with daughter, per ems family states has been weak and more \"confused\" states at baseline pt is A/Ox2. ). at presentation on 2023, and was found to have Acute cystitis with hematuria [N30.01]  Sepsis ([address]) [A41.9]  Pneumonia due to infectious organism, unspecified laterality, unspecified part of lung [J18.9] after workup.     Please

## 2023-11-29 NOTE — PATIENT CARE CONFERENCE
Intensive Care Daily Quality Rounding Checklist      ICU Team Members: Charge nurse and bedside nurse    ICU Day #: n/a--was changed to [facility] 11/27/23    Intubation Date: n/a    Ventilator Day #: n/a    Central Line Insertion Date: n/a         Day #: n/a        Indication: n/a     Arterial Line Insertion Date: n/a       Day #: n/a    Temporary Hemodialysis Catheter Insertion Date:  n/a      Day # n/a    DVT Prophylaxis: Heparin sq     GI Prophylaxis: PO diet     Lamb Catheter Insertion Date: n/a        Day #: n/a      Indications: n/a      Continued need (if yes, reason documented and discussed with physician): n/a    Skin Issues/ Wounds and ordered treatment discussed on rounds: no issues     Goals/ Plans for the Day: Daily labs, await final cultures, discharge soon    Reviewed plan and goals for day with patient and/or representative: Yes, patient updated

## 2023-12-02 LAB
MICROORGANISM SPEC CULT: NORMAL
SERVICE CMNT-IMP: NORMAL
SPECIMEN DESCRIPTION: NORMAL

## 2024-05-06 ENCOUNTER — INITIAL CONSULT (OUTPATIENT)
Dept: GERIATRIC MEDICINE | Age: 84
End: 2024-05-06
Payer: MEDICARE

## 2024-05-06 VITALS
TEMPERATURE: 97.7 F | DIASTOLIC BLOOD PRESSURE: 68 MMHG | SYSTOLIC BLOOD PRESSURE: 108 MMHG | BODY MASS INDEX: 26.52 KG/M2 | HEART RATE: 64 BPM | WEIGHT: 154.5 LBS | RESPIRATION RATE: 16 BRPM

## 2024-05-06 DIAGNOSIS — G31.84 MCI (MILD COGNITIVE IMPAIRMENT): Primary | ICD-10-CM

## 2024-05-06 PROCEDURE — G8419 CALC BMI OUT NRM PARAM NOF/U: HCPCS | Performed by: INTERNAL MEDICINE

## 2024-05-06 PROCEDURE — 99213 OFFICE O/P EST LOW 20 MIN: CPT | Performed by: INTERNAL MEDICINE

## 2024-05-06 PROCEDURE — 1090F PRES/ABSN URINE INCON ASSESS: CPT | Performed by: INTERNAL MEDICINE

## 2024-05-06 PROCEDURE — 1036F TOBACCO NON-USER: CPT | Performed by: INTERNAL MEDICINE

## 2024-05-06 PROCEDURE — 1123F ACP DISCUSS/DSCN MKR DOCD: CPT | Performed by: INTERNAL MEDICINE

## 2024-05-06 PROCEDURE — G8427 DOCREV CUR MEDS BY ELIG CLIN: HCPCS | Performed by: INTERNAL MEDICINE

## 2024-05-06 RX ORDER — CETIRIZINE HYDROCHLORIDE 10 MG/1
10 TABLET ORAL DAILY
COMMUNITY

## 2024-05-06 RX ORDER — POTASSIUM CHLORIDE 20 MEQ/1
20 TABLET, EXTENDED RELEASE ORAL DAILY
Status: ON HOLD | COMMUNITY
End: 2024-05-10 | Stop reason: HOSPADM

## 2024-05-06 RX ORDER — FERROUS SULFATE 325(65) MG
325 TABLET ORAL EVERY 12 HOURS
COMMUNITY

## 2024-05-06 RX ORDER — DONEPEZIL HYDROCHLORIDE 10 MG/1
10 TABLET, FILM COATED ORAL DAILY
COMMUNITY

## 2024-05-06 RX ORDER — DONEPEZIL HYDROCHLORIDE 5 MG/1
5 TABLET, FILM COATED ORAL DAILY
Qty: 30 TABLET | Refills: 3 | Status: SHIPPED
Start: 2024-05-06 | End: 2024-05-07

## 2024-05-06 SDOH — ECONOMIC STABILITY: FOOD INSECURITY: WITHIN THE PAST 12 MONTHS, THE FOOD YOU BOUGHT JUST DIDN'T LAST AND YOU DIDN'T HAVE MONEY TO GET MORE.: NEVER TRUE

## 2024-05-06 SDOH — ECONOMIC STABILITY: HOUSING INSECURITY
IN THE LAST 12 MONTHS, WAS THERE A TIME WHEN YOU DID NOT HAVE A STEADY PLACE TO SLEEP OR SLEPT IN A SHELTER (INCLUDING NOW)?: NO

## 2024-05-06 SDOH — ECONOMIC STABILITY: FOOD INSECURITY: WITHIN THE PAST 12 MONTHS, YOU WORRIED THAT YOUR FOOD WOULD RUN OUT BEFORE YOU GOT MONEY TO BUY MORE.: NEVER TRUE

## 2024-05-06 SDOH — ECONOMIC STABILITY: INCOME INSECURITY: HOW HARD IS IT FOR YOU TO PAY FOR THE VERY BASICS LIKE FOOD, HOUSING, MEDICAL CARE, AND HEATING?: NOT HARD AT ALL

## 2024-05-06 ASSESSMENT — PATIENT HEALTH QUESTIONNAIRE - PHQ9
SUM OF ALL RESPONSES TO PHQ QUESTIONS 1-9: 0
2. FEELING DOWN, DEPRESSED OR HOPELESS: NOT AT ALL
SUM OF ALL RESPONSES TO PHQ QUESTIONS 1-9: 0
SUM OF ALL RESPONSES TO PHQ QUESTIONS 1-9: 0
1. LITTLE INTEREST OR PLEASURE IN DOING THINGS: NOT AT ALL
SUM OF ALL RESPONSES TO PHQ9 QUESTIONS 1 & 2: 0
SUM OF ALL RESPONSES TO PHQ QUESTIONS 1-9: 0

## 2024-05-06 NOTE — PROGRESS NOTES
Chief Complaint   Patient presents with    Consultation     Referral from PCP, Dr Pink re:   memory issues.  Here with daughter, Prisca.  Per daughter, pt lives with her, has had memory issues x 5 years but declining over the past year.      Fall     Pt has a history of falls with injury.  Uses a cane & states she feels more stable with it.  Daughter reports that 20 + years ago pt fell off the porch & scraped her face.  CT was done & revealed \"Mini-strokes near the balance area\" of the brain.      Above discussed with Dr Garcia prior to his seeing the pt.

## 2024-05-06 NOTE — PROGRESS NOTES
CC Here for memory   Had sepsis and pneumonia    Has had UTI severe and daughter helps hygiene   Dr Trevizo stopped Memantine which caused diarrhea  On Aricept 10 mg a day and seems stable  IADL's review  NO driving \"ADD\"  Bills daughter  Pills independent organized   Daughter does cooking   No fires on the stove  HAD PRIMITIVO AS A DOCTOR   No GUNS May go to [facility] and not since [place]  [place] up [place] and left age 5  Made Serjio tucker at home   Gradual memory issus x 5 years , and results [facility]   KAIT reviewed   Cr 1.4  OAB   To see Dr Staples for Cr elevation   Impression: MCI mild                      Namenda costipation  Plan: D/C Exelon at 1.5 mg bid           Aricept to 15 mg a day

## 2024-05-07 ENCOUNTER — HOSPITAL ENCOUNTER (INPATIENT)
Age: 84
LOS: 2 days | Discharge: HOME | End: 2024-05-10
Attending: EMERGENCY MEDICINE | Admitting: INTERNAL MEDICINE
Payer: MEDICARE

## 2024-05-07 ENCOUNTER — APPOINTMENT (OUTPATIENT)
Dept: GENERAL RADIOLOGY | Age: 84
End: 2024-05-07
Payer: MEDICARE

## 2024-05-07 ENCOUNTER — APPOINTMENT (OUTPATIENT)
Dept: CT IMAGING | Age: 84
End: 2024-05-07
Payer: MEDICARE

## 2024-05-07 DIAGNOSIS — R74.01 TRANSAMINITIS: ICD-10-CM

## 2024-05-07 DIAGNOSIS — I95.9 HYPOTENSION, UNSPECIFIED HYPOTENSION TYPE: ICD-10-CM

## 2024-05-07 DIAGNOSIS — R53.1 GENERAL WEAKNESS: ICD-10-CM

## 2024-05-07 DIAGNOSIS — I48.91 ATRIAL FIBRILLATION, UNSPECIFIED TYPE (HCC): ICD-10-CM

## 2024-05-07 DIAGNOSIS — N39.0 COMPLICATED UTI (URINARY TRACT INFECTION): ICD-10-CM

## 2024-05-07 DIAGNOSIS — N17.9 AKI (ACUTE KIDNEY INJURY) (HCC): Primary | ICD-10-CM

## 2024-05-07 LAB
ALBUMIN SERPL-MCNC: 3.5 G/DL (ref 3.5–5.2)
ALP SERPL-CCNC: 111 U/L (ref 35–104)
ALT SERPL-CCNC: 151 U/L (ref 0–32)
ANION GAP SERPL CALCULATED.3IONS-SCNC: 9 MMOL/L (ref 7–16)
AST SERPL-CCNC: 174 U/L (ref 0–31)
BACTERIA URNS QL MICRO: ABNORMAL
BASOPHILS # BLD: 0.07 K/UL (ref 0–0.2)
BASOPHILS NFR BLD: 0 % (ref 0–2)
BILIRUB SERPL-MCNC: 0.3 MG/DL (ref 0–1.2)
BILIRUB UR QL STRIP: NEGATIVE
BUN SERPL-MCNC: 43 MG/DL (ref 6–23)
CALCIUM SERPL-MCNC: 9.1 MG/DL (ref 8.6–10.2)
CHLORIDE SERPL-SCNC: 106 MMOL/L (ref 98–107)
CLARITY UR: CLEAR
CO2 SERPL-SCNC: 21 MMOL/L (ref 22–29)
COLOR UR: YELLOW
CREAT SERPL-MCNC: 1.7 MG/DL (ref 0.5–1)
EOSINOPHIL # BLD: 0.02 K/UL (ref 0.05–0.5)
EOSINOPHILS RELATIVE PERCENT: 0 % (ref 0–6)
ERYTHROCYTE [DISTWIDTH] IN BLOOD BY AUTOMATED COUNT: 20.9 % (ref 11.5–15)
GFR, ESTIMATED: 29 ML/MIN/1.73M2
GLUCOSE SERPL-MCNC: 108 MG/DL (ref 74–99)
GLUCOSE UR STRIP-MCNC: NEGATIVE MG/DL
HCT VFR BLD AUTO: 37.9 % (ref 34–48)
HGB BLD-MCNC: 11.9 G/DL (ref 11.5–15.5)
HGB UR QL STRIP.AUTO: NEGATIVE
IMM GRANULOCYTES # BLD AUTO: 0.1 K/UL (ref 0–0.58)
IMM GRANULOCYTES NFR BLD: 1 % (ref 0–5)
KETONES UR STRIP-MCNC: NEGATIVE MG/DL
LACTATE BLDV-SCNC: 1.1 MMOL/L (ref 0.5–1.9)
LEUKOCYTE ESTERASE UR QL STRIP: ABNORMAL
LIPASE SERPL-CCNC: 13 U/L (ref 13–60)
LYMPHOCYTES NFR BLD: 0.65 K/UL (ref 1.5–4)
LYMPHOCYTES RELATIVE PERCENT: 4 % (ref 20–42)
MAGNESIUM SERPL-MCNC: 1.6 MG/DL (ref 1.6–2.6)
MCH RBC QN AUTO: 27.5 PG (ref 26–35)
MCHC RBC AUTO-ENTMCNC: 31.4 G/DL (ref 32–34.5)
MCV RBC AUTO: 87.7 FL (ref 80–99.9)
MONOCYTES NFR BLD: 0.84 K/UL (ref 0.1–0.95)
MONOCYTES NFR BLD: 5 % (ref 2–12)
NEUTROPHILS NFR BLD: 90 % (ref 43–80)
NEUTS SEG NFR BLD: 15.12 K/UL (ref 1.8–7.3)
NITRITE UR QL STRIP: NEGATIVE
PH UR STRIP: 5.5 [PH] (ref 5–9)
PLATELET # BLD AUTO: 145 K/UL (ref 130–450)
PMV BLD AUTO: 10 FL (ref 7–12)
POTASSIUM SERPL-SCNC: 4.3 MMOL/L (ref 3.5–5)
PROT SERPL-MCNC: 5.3 G/DL (ref 6.4–8.3)
PROT UR STRIP-MCNC: NEGATIVE MG/DL
RBC # BLD AUTO: 4.32 M/UL (ref 3.5–5.5)
RBC # BLD: ABNORMAL 10*6/UL
RBC #/AREA URNS HPF: ABNORMAL /HPF
SARS-COV-2 RDRP RESP QL NAA+PROBE: NOT DETECTED
SODIUM SERPL-SCNC: 136 MMOL/L (ref 132–146)
SP GR UR STRIP: 1.01 (ref 1–1.03)
SPECIMEN DESCRIPTION: NORMAL
TROPONIN I SERPL HS-MCNC: 28 NG/L (ref 0–9)
TROPONIN I SERPL HS-MCNC: 35 NG/L (ref 0–9)
TSH SERPL DL<=0.05 MIU/L-ACNC: 1.52 UIU/ML (ref 0.27–4.2)
UROBILINOGEN UR STRIP-ACNC: 0.2 EU/DL (ref 0–1)
WBC #/AREA URNS HPF: ABNORMAL /HPF
WBC OTHER # BLD: 16.8 K/UL (ref 4.5–11.5)

## 2024-05-07 PROCEDURE — 85025 COMPLETE CBC W/AUTO DIFF WBC: CPT

## 2024-05-07 PROCEDURE — 84484 ASSAY OF TROPONIN QUANT: CPT

## 2024-05-07 PROCEDURE — 99285 EMERGENCY DEPT VISIT HI MDM: CPT

## 2024-05-07 PROCEDURE — 74176 CT ABD & PELVIS W/O CONTRAST: CPT

## 2024-05-07 PROCEDURE — 81001 URINALYSIS AUTO W/SCOPE: CPT

## 2024-05-07 PROCEDURE — 83735 ASSAY OF MAGNESIUM: CPT

## 2024-05-07 PROCEDURE — 2580000003 HC RX 258: Performed by: EMERGENCY MEDICINE

## 2024-05-07 PROCEDURE — 87086 URINE CULTURE/COLONY COUNT: CPT

## 2024-05-07 PROCEDURE — 6360000002 HC RX W HCPCS: Performed by: EMERGENCY MEDICINE

## 2024-05-07 PROCEDURE — 96365 THER/PROPH/DIAG IV INF INIT: CPT

## 2024-05-07 PROCEDURE — 87154 CUL TYP ID BLD PTHGN 6+ TRGT: CPT

## 2024-05-07 PROCEDURE — 83690 ASSAY OF LIPASE: CPT

## 2024-05-07 PROCEDURE — 71045 X-RAY EXAM CHEST 1 VIEW: CPT

## 2024-05-07 PROCEDURE — 93005 ELECTROCARDIOGRAM TRACING: CPT | Performed by: EMERGENCY MEDICINE

## 2024-05-07 PROCEDURE — 87040 BLOOD CULTURE FOR BACTERIA: CPT

## 2024-05-07 PROCEDURE — 84443 ASSAY THYROID STIM HORMONE: CPT

## 2024-05-07 PROCEDURE — 96361 HYDRATE IV INFUSION ADD-ON: CPT

## 2024-05-07 PROCEDURE — 80053 COMPREHEN METABOLIC PANEL: CPT

## 2024-05-07 PROCEDURE — 87088 URINE BACTERIA CULTURE: CPT

## 2024-05-07 PROCEDURE — 83605 ASSAY OF LACTIC ACID: CPT

## 2024-05-07 PROCEDURE — 87635 SARS-COV-2 COVID-19 AMP PRB: CPT

## 2024-05-07 RX ORDER — 0.9 % SODIUM CHLORIDE 0.9 %
500 INTRAVENOUS SOLUTION INTRAVENOUS ONCE
Status: COMPLETED | OUTPATIENT
Start: 2024-05-07 | End: 2024-05-07

## 2024-05-07 RX ORDER — DONEPEZIL HYDROCHLORIDE 5 MG/1
5 TABLET, FILM COATED ORAL DAILY
Qty: 90 TABLET | Refills: 1 | Status: ON HOLD
Start: 2024-05-07 | End: 2024-05-10 | Stop reason: HOSPADM

## 2024-05-07 RX ADMIN — CEFEPIME 2000 MG: 2 INJECTION, POWDER, FOR SOLUTION INTRAVENOUS at 22:58

## 2024-05-07 RX ADMIN — SODIUM CHLORIDE 500 ML: 9 INJECTION, SOLUTION INTRAVENOUS at 19:54

## 2024-05-07 RX ADMIN — SODIUM CHLORIDE 500 ML: 9 INJECTION, SOLUTION INTRAVENOUS at 21:58

## 2024-05-07 ASSESSMENT — PAIN - FUNCTIONAL ASSESSMENT: PAIN_FUNCTIONAL_ASSESSMENT: NONE - DENIES PAIN

## 2024-05-07 NOTE — ED PROVIDER NOTES
attack), Hyperlipidemia, Hypertension, and Thyroid disease.     EMERGENCY DEPARTMENT COURSE and DIFFERENTIAL DIAGNOSIS/MDM:   Vitals:    Vitals:    05/09/24 0530 05/09/24 0551 05/09/24 0645 05/09/24 1040   BP:   (!) 112/57 124/79   Pulse:   80 91   Resp:   18 16   Temp:   97.9 °F (36.6 °C) 97.7 °F (36.5 °C)   TempSrc:   Oral Oral   SpO2:   97% 98%   Weight: 75.3 kg (166 lb) 75.3 kg (166 lb)     Height:           Patient was given the following medications:  Medications   topiramate (TOPAMAX) tablet 25 mg (25 mg Oral Given 5/8/24 2107)   sucralfate (CARAFATE) tablet 1 g (1 g Oral Given 5/9/24 1200)   pantoprazole (PROTONIX) tablet 40 mg (40 mg Oral Given 5/9/24 0806)   metoprolol succinate (TOPROL XL) extended release tablet 25 mg ( Oral Automatically Held 5/14/24 0900)   levothyroxine (SYNTHROID) tablet 75 mcg (75 mcg Oral Given 5/9/24 0540)   ferrous sulfate (IRON 325) tablet 325 mg (325 mg Oral Given 5/9/24 0805)   donepezil (ARICEPT) tablet 15 mg (15 mg Oral Given 5/8/24 2106)   cetirizine (ZYRTEC) tablet 10 mg (10 mg Oral Given 5/9/24 0805)   busPIRone (BUSPAR) tablet 15 mg (15 mg Oral Given 5/9/24 0806)   atorvastatin (LIPITOR) tablet 40 mg (40 mg Oral Given 5/8/24 2107)   aspirin chewable tablet 81 mg (81 mg Oral Given 5/9/24 0805)   sodium chloride flush 0.9 % injection 10 mL (10 mLs IntraVENous Given 5/9/24 0806)   sodium chloride flush 0.9 % injection 10 mL (has no administration in time range)   0.9 % sodium chloride infusion (has no administration in time range)   enoxaparin Sodium (LOVENOX) injection 30 mg (30 mg SubCUTAneous Given 5/9/24 0806)   ondansetron (ZOFRAN-ODT) disintegrating tablet 4 mg ( Oral See Alternative 5/9/24 0311)     Or   ondansetron (ZOFRAN) injection 4 mg (4 mg IntraVENous Given 5/9/24 0311)   senna (SENOKOT) tablet 8.6 mg (has no administration in time range)   acetaminophen (TYLENOL) tablet 650 mg (650 mg Oral Given 5/9/24 0311)     Or   acetaminophen (TYLENOL) suppository 650 mg     Depression     Hyperlipidemia     Hypertension     Thyroid disease          CONSULTS: (Who and What was discussed)  PHARMACY TO DOSE VANCOMYCIN  IP CONSULT TO INFECTIOUS DISEASES  IP CONSULT TO CARDIOLOGY    Discussion with Other Profesionals : Admitting Team hospitalist    Social Determinants : None    Records Reviewed : Outpatient Notes      CC/HPI Summary, DDx, ED Course, and Reassessment:   Pt presented w fatigue, low labile bp, intermittent abdominal sx. Pt was given vanco and cefepime, pt had broad workup including cultures and ct scan. Labs showed daniel, dehydration, possible complicated  uti. Pt to be admitted for further workup, bp monitoring, iv abx. Hospitalist agreed w plan.    Disposition Considerations (tests considered but not done, Shared Decision Making, Pt Expectation of Test or Tx.):   admit      I am the Primary Clinician of Record.    FINAL IMPRESSION      1. DANIEL (acute kidney injury) (HCC)    2. Hypotension, unspecified hypotension type    3. Transaminitis    4. General weakness    5. Complicated UTI (urinary tract infection)          DISPOSITION/PLAN     DISPOSITION Admitted 05/08/2024 01:16:55 AM      PATIENT REFERRED TO:  No follow-up provider specified.    DISCHARGE MEDICATIONS:  Current Discharge Medication List          DISCONTINUED MEDICATIONS:  Current Discharge Medication List                 (Please note that portions of this note were completed with a voice recognition program.  Efforts were made to edit the dictations but occasionally words are mis-transcribed.)    Josef Manning DO (electronically signed)           Josef Manning DO  05/09/24 3559

## 2024-05-08 PROBLEM — N39.0 UTI (URINARY TRACT INFECTION): Status: ACTIVE | Noted: 2024-05-08

## 2024-05-08 LAB
ALBUMIN SERPL-MCNC: 3.4 G/DL (ref 3.5–5.2)
ALP SERPL-CCNC: 123 U/L (ref 35–104)
ALT SERPL-CCNC: 140 U/L (ref 0–32)
ANION GAP SERPL CALCULATED.3IONS-SCNC: 11 MMOL/L (ref 7–16)
AST SERPL-CCNC: 117 U/L (ref 0–31)
BASOPHILS # BLD: 0.07 K/UL (ref 0–0.2)
BASOPHILS NFR BLD: 1 % (ref 0–2)
BILIRUB SERPL-MCNC: 0.4 MG/DL (ref 0–1.2)
BUN SERPL-MCNC: 40 MG/DL (ref 6–23)
CALCIUM SERPL-MCNC: 8.7 MG/DL (ref 8.6–10.2)
CHLORIDE SERPL-SCNC: 109 MMOL/L (ref 98–107)
CO2 SERPL-SCNC: 18 MMOL/L (ref 22–29)
CREAT SERPL-MCNC: 1.5 MG/DL (ref 0.5–1)
EKG ATRIAL RATE: 69 BPM
EKG P AXIS: 36 DEGREES
EKG P-R INTERVAL: 188 MS
EKG Q-T INTERVAL: 438 MS
EKG QRS DURATION: 84 MS
EKG QTC CALCULATION (BAZETT): 469 MS
EKG R AXIS: -13 DEGREES
EKG T AXIS: 27 DEGREES
EKG VENTRICULAR RATE: 69 BPM
EOSINOPHIL # BLD: 0.14 K/UL (ref 0.05–0.5)
EOSINOPHILS RELATIVE PERCENT: 2 % (ref 0–6)
ERYTHROCYTE [DISTWIDTH] IN BLOOD BY AUTOMATED COUNT: 21.2 % (ref 11.5–15)
GFR, ESTIMATED: 33 ML/MIN/1.73M2
GLUCOSE SERPL-MCNC: 89 MG/DL (ref 74–99)
HCT VFR BLD AUTO: 38.5 % (ref 34–48)
HGB BLD-MCNC: 11.7 G/DL (ref 11.5–15.5)
LACTATE BLDV-SCNC: 1.2 MMOL/L (ref 0.5–1.9)
LYMPHOCYTES NFR BLD: 0.21 K/UL (ref 1.5–4)
LYMPHOCYTES RELATIVE PERCENT: 3 % (ref 20–42)
MCH RBC QN AUTO: 26.3 PG (ref 26–35)
MCHC RBC AUTO-ENTMCNC: 30.4 G/DL (ref 32–34.5)
MCV RBC AUTO: 86.5 FL (ref 80–99.9)
MONOCYTES NFR BLD: 0.07 K/UL (ref 0.1–0.95)
MONOCYTES NFR BLD: 1 % (ref 2–12)
NEUTROPHILS NFR BLD: 94 % (ref 43–80)
NEUTS SEG NFR BLD: 7.61 K/UL (ref 1.8–7.3)
PLATELET # BLD AUTO: 110 K/UL (ref 130–450)
PMV BLD AUTO: 9.8 FL (ref 7–12)
POTASSIUM SERPL-SCNC: 4.1 MMOL/L (ref 3.5–5)
PROT SERPL-MCNC: 5.4 G/DL (ref 6.4–8.3)
RBC # BLD AUTO: 4.45 M/UL (ref 3.5–5.5)
RBC # BLD: ABNORMAL 10*6/UL
SODIUM SERPL-SCNC: 138 MMOL/L (ref 132–146)
WBC OTHER # BLD: 8.1 K/UL (ref 4.5–11.5)

## 2024-05-08 PROCEDURE — 93010 ELECTROCARDIOGRAM REPORT: CPT | Performed by: INTERNAL MEDICINE

## 2024-05-08 PROCEDURE — 97165 OT EVAL LOW COMPLEX 30 MIN: CPT

## 2024-05-08 PROCEDURE — 6370000000 HC RX 637 (ALT 250 FOR IP): Performed by: PHYSICIAN ASSISTANT

## 2024-05-08 PROCEDURE — 80053 COMPREHEN METABOLIC PANEL: CPT

## 2024-05-08 PROCEDURE — 2580000003 HC RX 258: Performed by: EMERGENCY MEDICINE

## 2024-05-08 PROCEDURE — 2580000003 HC RX 258: Performed by: HOSPITALIST

## 2024-05-08 PROCEDURE — 97161 PT EVAL LOW COMPLEX 20 MIN: CPT

## 2024-05-08 PROCEDURE — 85025 COMPLETE CBC W/AUTO DIFF WBC: CPT

## 2024-05-08 PROCEDURE — 6360000002 HC RX W HCPCS: Performed by: PHYSICIAN ASSISTANT

## 2024-05-08 PROCEDURE — 96375 TX/PRO/DX INJ NEW DRUG ADDON: CPT

## 2024-05-08 PROCEDURE — 36415 COLL VENOUS BLD VENIPUNCTURE: CPT

## 2024-05-08 PROCEDURE — 2580000003 HC RX 258: Performed by: SPECIALIST

## 2024-05-08 PROCEDURE — 6360000002 HC RX W HCPCS: Performed by: EMERGENCY MEDICINE

## 2024-05-08 PROCEDURE — 2580000003 HC RX 258: Performed by: PHYSICIAN ASSISTANT

## 2024-05-08 PROCEDURE — 6360000002 HC RX W HCPCS: Performed by: SPECIALIST

## 2024-05-08 PROCEDURE — 83605 ASSAY OF LACTIC ACID: CPT

## 2024-05-08 PROCEDURE — 2060000000 HC ICU INTERMEDIATE R&B

## 2024-05-08 RX ORDER — METOPROLOL SUCCINATE 25 MG/1
25 TABLET, EXTENDED RELEASE ORAL DAILY
Status: DISCONTINUED | OUTPATIENT
Start: 2024-05-08 | End: 2024-05-10 | Stop reason: HOSPADM

## 2024-05-08 RX ORDER — ATORVASTATIN CALCIUM 40 MG/1
40 TABLET, FILM COATED ORAL NIGHTLY
Status: DISCONTINUED | OUTPATIENT
Start: 2024-05-08 | End: 2024-05-10 | Stop reason: HOSPADM

## 2024-05-08 RX ORDER — SENNOSIDES A AND B 8.6 MG/1
1 TABLET, FILM COATED ORAL DAILY PRN
Status: DISCONTINUED | OUTPATIENT
Start: 2024-05-08 | End: 2024-05-10 | Stop reason: HOSPADM

## 2024-05-08 RX ORDER — RIVASTIGMINE TARTRATE 1.5 MG/1
1.5 CAPSULE ORAL 2 TIMES DAILY
COMMUNITY

## 2024-05-08 RX ORDER — ASPIRIN 81 MG/1
81 TABLET, CHEWABLE ORAL DAILY
Status: DISCONTINUED | OUTPATIENT
Start: 2024-05-08 | End: 2024-05-10

## 2024-05-08 RX ORDER — ONDANSETRON 4 MG/1
4 TABLET, ORALLY DISINTEGRATING ORAL EVERY 8 HOURS PRN
Status: DISCONTINUED | OUTPATIENT
Start: 2024-05-08 | End: 2024-05-10 | Stop reason: HOSPADM

## 2024-05-08 RX ORDER — BUPROPION HYDROCHLORIDE 150 MG/1
150 TABLET ORAL EVERY 12 HOURS
Status: DISCONTINUED | OUTPATIENT
Start: 2024-05-08 | End: 2024-05-08 | Stop reason: SDUPTHER

## 2024-05-08 RX ORDER — DONEPEZIL HYDROCHLORIDE 10 MG/1
15 TABLET, FILM COATED ORAL DAILY
Status: DISCONTINUED | OUTPATIENT
Start: 2024-05-08 | End: 2024-05-10 | Stop reason: HOSPADM

## 2024-05-08 RX ORDER — 0.9 % SODIUM CHLORIDE 0.9 %
500 INTRAVENOUS SOLUTION INTRAVENOUS ONCE
Status: COMPLETED | OUTPATIENT
Start: 2024-05-08 | End: 2024-05-08

## 2024-05-08 RX ORDER — CETIRIZINE HYDROCHLORIDE 10 MG/1
10 TABLET ORAL DAILY
Status: DISCONTINUED | OUTPATIENT
Start: 2024-05-08 | End: 2024-05-10 | Stop reason: HOSPADM

## 2024-05-08 RX ORDER — PANTOPRAZOLE SODIUM 40 MG/1
40 TABLET, DELAYED RELEASE ORAL DAILY
Status: DISCONTINUED | OUTPATIENT
Start: 2024-05-08 | End: 2024-05-10 | Stop reason: HOSPADM

## 2024-05-08 RX ORDER — ACETAMINOPHEN 650 MG/1
650 SUPPOSITORY RECTAL EVERY 6 HOURS PRN
Status: DISCONTINUED | OUTPATIENT
Start: 2024-05-08 | End: 2024-05-10 | Stop reason: HOSPADM

## 2024-05-08 RX ORDER — BUPROPION HYDROCHLORIDE 150 MG/1
150 TABLET, EXTENDED RELEASE ORAL EVERY 12 HOURS
Status: DISCONTINUED | OUTPATIENT
Start: 2024-05-08 | End: 2024-05-10 | Stop reason: HOSPADM

## 2024-05-08 RX ORDER — SODIUM CHLORIDE 0.9 % (FLUSH) 0.9 %
10 SYRINGE (ML) INJECTION EVERY 12 HOURS SCHEDULED
Status: DISCONTINUED | OUTPATIENT
Start: 2024-05-08 | End: 2024-05-10 | Stop reason: HOSPADM

## 2024-05-08 RX ORDER — SODIUM CHLORIDE 9 MG/ML
INJECTION, SOLUTION INTRAVENOUS CONTINUOUS
Status: ACTIVE | OUTPATIENT
Start: 2024-05-08 | End: 2024-05-10

## 2024-05-08 RX ORDER — ENOXAPARIN SODIUM 100 MG/ML
30 INJECTION SUBCUTANEOUS DAILY
Status: DISCONTINUED | OUTPATIENT
Start: 2024-05-08 | End: 2024-05-09

## 2024-05-08 RX ORDER — SUCRALFATE 1 G/1
1 TABLET ORAL EVERY 6 HOURS
Status: DISCONTINUED | OUTPATIENT
Start: 2024-05-08 | End: 2024-05-10 | Stop reason: HOSPADM

## 2024-05-08 RX ORDER — SODIUM CHLORIDE 9 MG/ML
INJECTION, SOLUTION INTRAVENOUS PRN
Status: DISCONTINUED | OUTPATIENT
Start: 2024-05-08 | End: 2024-05-10 | Stop reason: HOSPADM

## 2024-05-08 RX ORDER — SODIUM CHLORIDE 0.9 % (FLUSH) 0.9 %
10 SYRINGE (ML) INJECTION PRN
Status: DISCONTINUED | OUTPATIENT
Start: 2024-05-08 | End: 2024-05-10 | Stop reason: HOSPADM

## 2024-05-08 RX ORDER — ONDANSETRON 2 MG/ML
4 INJECTION INTRAMUSCULAR; INTRAVENOUS EVERY 6 HOURS PRN
Status: DISCONTINUED | OUTPATIENT
Start: 2024-05-08 | End: 2024-05-10 | Stop reason: HOSPADM

## 2024-05-08 RX ORDER — ACETAMINOPHEN 325 MG/1
650 TABLET ORAL EVERY 6 HOURS PRN
Status: DISCONTINUED | OUTPATIENT
Start: 2024-05-08 | End: 2024-05-10 | Stop reason: HOSPADM

## 2024-05-08 RX ORDER — FERROUS SULFATE 325(65) MG
325 TABLET ORAL EVERY 12 HOURS
Status: DISCONTINUED | OUTPATIENT
Start: 2024-05-08 | End: 2024-05-10 | Stop reason: HOSPADM

## 2024-05-08 RX ORDER — TOPIRAMATE 25 MG/1
25 TABLET ORAL NIGHTLY
Status: DISCONTINUED | OUTPATIENT
Start: 2024-05-08 | End: 2024-05-10 | Stop reason: HOSPADM

## 2024-05-08 RX ORDER — LEVOTHYROXINE SODIUM 0.07 MG/1
75 TABLET ORAL DAILY
Status: DISCONTINUED | OUTPATIENT
Start: 2024-05-08 | End: 2024-05-10 | Stop reason: HOSPADM

## 2024-05-08 RX ADMIN — FERROUS SULFATE TAB 325 MG (65 MG ELEMENTAL FE) 325 MG: 325 (65 FE) TAB at 21:06

## 2024-05-08 RX ADMIN — SODIUM CHLORIDE: 9 INJECTION, SOLUTION INTRAVENOUS at 02:39

## 2024-05-08 RX ADMIN — BUPROPION HYDROCHLORIDE 150 MG: 150 TABLET, FILM COATED, EXTENDED RELEASE ORAL at 08:29

## 2024-05-08 RX ADMIN — VANCOMYCIN HYDROCHLORIDE 1500 MG: 10 INJECTION, POWDER, LYOPHILIZED, FOR SOLUTION INTRAVENOUS at 01:03

## 2024-05-08 RX ADMIN — SODIUM CHLORIDE 500 ML: 9 INJECTION, SOLUTION INTRAVENOUS at 08:42

## 2024-05-08 RX ADMIN — SUCRALFATE 1 G: 1 TABLET ORAL at 16:26

## 2024-05-08 RX ADMIN — FERROUS SULFATE TAB 325 MG (65 MG ELEMENTAL FE) 325 MG: 325 (65 FE) TAB at 08:29

## 2024-05-08 RX ADMIN — SODIUM CHLORIDE, PRESERVATIVE FREE 10 ML: 5 INJECTION INTRAVENOUS at 21:07

## 2024-05-08 RX ADMIN — SUCRALFATE 1 G: 1 TABLET ORAL at 11:02

## 2024-05-08 RX ADMIN — ATORVASTATIN CALCIUM 40 MG: 40 TABLET, FILM COATED ORAL at 21:07

## 2024-05-08 RX ADMIN — WATER 2000 MG: 1 INJECTION INTRAMUSCULAR; INTRAVENOUS; SUBCUTANEOUS at 13:19

## 2024-05-08 RX ADMIN — ENOXAPARIN SODIUM 30 MG: 100 INJECTION SUBCUTANEOUS at 08:28

## 2024-05-08 RX ADMIN — BUSPIRONE HYDROCHLORIDE 15 MG: 10 TABLET ORAL at 21:06

## 2024-05-08 RX ADMIN — SODIUM CHLORIDE 500 ML: 9 INJECTION, SOLUTION INTRAVENOUS at 11:16

## 2024-05-08 RX ADMIN — TOPIRAMATE 25 MG: 25 TABLET, FILM COATED ORAL at 21:07

## 2024-05-08 RX ADMIN — SUCRALFATE 1 G: 1 TABLET ORAL at 06:17

## 2024-05-08 RX ADMIN — ASPIRIN 81 MG CHEWABLE TABLET 81 MG: 81 TABLET CHEWABLE at 08:29

## 2024-05-08 RX ADMIN — SODIUM CHLORIDE, PRESERVATIVE FREE 10 ML: 5 INJECTION INTRAVENOUS at 08:28

## 2024-05-08 RX ADMIN — LEVOTHYROXINE SODIUM 75 MCG: 75 TABLET ORAL at 06:17

## 2024-05-08 RX ADMIN — BUPROPION HYDROCHLORIDE 150 MG: 150 TABLET, FILM COATED, EXTENDED RELEASE ORAL at 21:06

## 2024-05-08 RX ADMIN — DONEPEZIL HYDROCHLORIDE 15 MG: 10 TABLET, FILM COATED ORAL at 21:06

## 2024-05-08 RX ADMIN — PANTOPRAZOLE SODIUM 40 MG: 40 TABLET, DELAYED RELEASE ORAL at 08:29

## 2024-05-08 RX ADMIN — BUSPIRONE HYDROCHLORIDE 15 MG: 10 TABLET ORAL at 08:29

## 2024-05-08 RX ADMIN — CETIRIZINE HYDROCHLORIDE 10 MG: 10 TABLET, FILM COATED ORAL at 08:29

## 2024-05-08 NOTE — ED NOTES
ED to Inpatient Handoff Report    Notified floor that electronic handoff available and patient ready for transport to room 445.    Safety Risks: Risk of falls    Patient in Restraints: no    Constant Observer or Patient : no    Telemetry Monitoring Ordered: Yes          Order to transfer to unit without monitor: YES    Last MEWS: 1 Time completed: 0132    Deterioration Index: 27.59    Vitals:    05/07/24 2009 05/07/24 2159 05/08/24 0104 05/08/24 0132   BP: 100/60 102/62 (!) 95/53 (!) 121/99   Pulse: 65 61 62 82   Resp: 18 17 15 18   Temp:    98.4 °F (36.9 °C)   TempSrc:    Oral   SpO2: 98% 99% 96% 95%   Weight:       Height:           Opportunity for questions and clarification was provided.

## 2024-05-08 NOTE — PROGRESS NOTES
Physical Therapy  Facility/Department: [address]  Physical Therapy Initial Assessment    Name: Cindy Wells  : 1940  MRN: [id number]  Date of Service: 2024    Patient Diagnosis(es): The primary encounter diagnosis was DANIEL (acute kidney injury) (HCC). Diagnoses of Hypotension, unspecified hypotension type, Transaminitis, and General weakness were also pertinent to this visit.  Past Medical History:  has a past medical history of Anxiety, Cardiomyopathy (HCC), CHF (congestive heart failure) (HCC), CKD (chronic kidney disease) stage 3, GFR 30-59 ml/min (HCC), Cognitive dysfunction, Depression, History of TIA (transient ischemic attack), Hyperlipidemia, Hypertension, and Thyroid disease.  Past Surgical History:  has a past surgical history that includes Thyroid lobectomy; knee surgery; ERCP (N/A, 9/15/2020); Cholecystectomy, laparoscopic (N/A, 2020); Hysterectomy; Tonsillectomy; and ERCP (N/A, 10/21/2020).        Referring provider:  Gurpreet Arevalo MD    PT Order:  PT eval and treat     Evaluating PT:  Awa Hobbs, PT, DPT PT [id number]    Room #:  0445/0445-A  Diagnosis:  UTI (urinary tract infection) [N39.0]  Transaminitis [R74.01]  General weakness [R53.1]  DANIEL (acute kidney injury) (HCC) [N17.9]  Hypotension, unspecified hypotension type [I95.9]  Precautions:  fall risk  Equipment Needs:  none.  Pt reported owning a cane and a walker.      SUBJECTIVE:    Pt lives with her daughter in a 2 story home with 0 stairs to enter and 0 rail.  Bed is on the second floor and bed is on the first and second.    Pt ambulated with cane PTA.    OBJECTIVE:   Initial Evaluation  Date:  Treatment Short Term/ Long Term   Goals   Was pt agreeable to Eval/treatment? yes     Does pt have pain? None reported     Bed Mobility  Rolling: SBA  Supine to sit: SBA  Sit to supine: NT  Scooting: SBA to sitting EOB  independent   Transfers Sit to stand: Min A  Stand to sit: Min A  Stand pivot: NT  supervision   Ambulation

## 2024-05-08 NOTE — PROGRESS NOTES
Notified Dr. Arevalo via secure messenger that patient blood pressure was 84/48 after 500cc normal saline transfusion. Also notified that patient had positive blood cultures.

## 2024-05-08 NOTE — CARE COORDINATION
Chart reviewed for transition of care at discharge. Admitted with UTI and E Coli bacteremia.History of CHF, CKD, cardiomyopathy, and TIA. Patient is currently on IV Ceftriaxone and a continuous IV of NS. Her BP had been trending low, and is improved to 104/53 per flow sheet. Met with patient at bedside who stated that she lives with her daughter  Prisca in a 2 story home with her bed and bath on the 2nd floor. She uses a cane at home and has a shower chair. Her daughter assists her getting in the shower. She stated that she is then able to wash and then dress herself once assisted from the shower. She is able to do light house cleaning and cooking. Daughter assists her as needed. History of a past stay at [facility]. PT atilio noted 16, walking 15 ft min assist with ww.  OT was 18. Discussed HHC or SNF. Will talk with daughter regarding HHC.  PCP is Dr Pink and pharmacy is [facility] in [place]. Her discharge plan is home when medically stable, with her daughter transporting. CM/SW will follow.  Electronically signed by Brent Escalona RN on 5/8/2024 at 4:12 PM

## 2024-05-08 NOTE — PROGRESS NOTES
Antibiotic Extended Infusion Policy     This patient is on medication that requires renal, weight, and/or indication dose adjustment.      Date Body Weight IBW  Adjusted BW SCr  CrCl Dialysis status BMI   5/8/2024 69.9 kg (154 lb) Ideal body weight: 54.7 kg (120 lb 9.5 oz)  Adjusted ideal body weight: 60.8 kg (133 lb 15.3 oz) Serum creatinine: 1.5 mg/dL (H) 05/08/24 0324  Estimated creatinine clearance: 26 mL/min (A) N/a Body mass index is 26.43 kg/m².       Pharmacy has dose-adjusted the following medication(s):    Ordered Medication: Cefepime 1000mg q12h     Order Changed/converted to: Cefepime 1000mg q24h    These changes were made per protocol according to the [facility]   Automatic Extended Infusion Dose Adjustment Policy.     *Please note this dose may need readjusted if patient's condition changes.    Please contact pharmacy with any questions regarding these changes.    Ector Tapia RPH  5/8/2024  7:52 AM

## 2024-05-08 NOTE — H&P
Internal Medicine History & Physical     Name: Cindy Wells  : 1940  Chief Complaint: Fatigue (States feels like she has a Uti, went to [facility] to get urine tested and was hypotensive 90's over 50s so they sent patient in)  Primary Care Physician: Maryan Pink MD  Admission date: 2024  Date of service: 2024     History of Present Illness  Cindy is a 84 y.o. year old female.  Patient states that she went to her family doctor and was found to be having low blood pressures.  States she was sent over to the ER for further evaluation.  Patient states usually her blood pressure is not low like this.  She denies fever, chills, headache, vision or hearing changes, dysuria, hematuria, nausea, vomiting, bedsores or wounds, troubles chewing or swallowing, chest pain, shortness of breath.  Patient states she did feel dizzy and lightheaded when her blood pressure was low.  States this has been coming on over the last few days.  She denies any other medication changes recently.      ED course:   Initial blood work and imaging studies performed. Admission recommended by ED physician. Case was discussed with ED provider. Meds in ED consisted of the following:  Medications   topiramate (TOPAMAX) tablet 25 mg (25 mg Oral Not Given 24)   sucralfate (CARAFATE) tablet 1 g (1 g Oral Given 24 1102)   pantoprazole (PROTONIX) tablet 40 mg (40 mg Oral Given 24 08)   metoprolol succinate (TOPROL XL) extended release tablet 25 mg ( Oral Automatically Held 24 0900)   levothyroxine (SYNTHROID) tablet 75 mcg (75 mcg Oral Given 24 0617)   ferrous sulfate (IRON 325) tablet 325 mg (325 mg Oral Given 24 0829)   donepezil (ARICEPT) tablet 15 mg (15 mg Oral Not Given 24 042)   cetirizine (ZYRTEC) tablet 10 mg (10 mg Oral Given 24)   busPIRone (BUSPAR) tablet 15 mg (15 mg Oral Given 24 08)   atorvastatin (LIPITOR) tablet 40 mg (40 mg Oral Not Given 24 0429)   aspirin  EOMI  Ears: no obvious scars, no lesions, no masses, hearing intact  Mouth: mucous membranes moist, no obvious oral sores  Head: normocephalic, atraumatic  Neck: no JVD, no adenopathy, no thyromegaly, neck is supple, trachea is midline  Back: ROM normal, no CVA tenderness.  Chest: no pain on palpation  Lungs: clear to auscultation bilaterally, without rhonchi, crackle, wheezing, or rale, no retractions or use of accessory muscles  Heart: regular rate and regular rhythm, no murmur, normal S1, S2  Abdomen: soft, non-tender; bowel sounds normal; no masses, no organomegaly  : Deferred   Extremities: no lower extremity edema, extremities atraumatic, no cyanosis, no clubbing, 2+ pedal pulses palpated  Skin: normal color, normal texture, normal turgor, no rashes, no lesions  Neurologic:5/5 muscle strength throughout, normal muscle tone throughout, face symmetric, hearing intact, tongue midline, speech appropriate without slurring, sensation to fine touch intact in upper and lower extremities    Labs-   Lab Results   Component Value Date    WBC 8.1 05/08/2024    HGB 11.7 05/08/2024    HCT 38.5 05/08/2024     (L) 05/08/2024     05/08/2024    K 4.1 05/08/2024     (H) 05/08/2024    CREATININE 1.5 (H) 05/08/2024    BUN 40 (H) 05/08/2024    CO2 18 (L) 05/08/2024    GLUCOSE 89 05/08/2024     (H) 05/08/2024     (H) 05/08/2024    INR 1.2 09/15/2020    APTT 31.5 09/15/2020     Lab Results   Component Value Date    CKTOTAL 332 (H) 09/17/2017    CKMB 13.6 (H) 09/17/2017    TROPONINI <0.01 09/12/2020       Recent Radiological Studies:  CT ABDOMEN PELVIS WO CONTRAST Additional Contrast? None   Final Result   1. Emphysematous cystitis.   2. Cholecystectomy.   3. Diverticulosis.         XR CHEST PORTABLE   Final Result   No acute process.      No significant change compared to prior exam.             Assessment  Active Hospital Problems    Diagnosis     E coli bacteremia [R78.81, B96.20]      Priority:

## 2024-05-08 NOTE — PROGRESS NOTES
with completion of ADL/IADL tasks for functional independence and quality of life.     Patient education provided regardin) OT role/purpose and plan of care 2)use of call light for assistance 3)safety during self care and functional tasks. Patient verbalized understanding.    Further skilled OT treatment indicated to increase patient's safety and independence with completion of ADL/IADL tasks in order to maximize patient's functional independence and quality of life.    Rehab Potential: Good for established goals.  Patient / Family Goal: to go home  Patient and/or family were instructed on functional diagnosis, prognosis/goals, and OT plan of care. Verbalized understanding.    Eval Complexity: low     Time In: 1026  Time Out: 1042  Total Treatment Time: 0 minutes      Minutes Units   OT Eval Low 99418 16 1   OT Eval Medium 62573     OT Eval High 52774     OT Re-Eval 92313     Therapeutic Ex 98880     Therapeutic Activities 56158     ADL/Self Care 01559     Orthotic Management 77199     Neuro Re-Ed 11663     Non-Billable Time N/A ---     Evaluation time includes thorough review of current medical information, gathering information on past medical history/social history and prior level of function, completion of standardized testing/informal observation of tasks, assessment of data, and education on plan of care and goals.    Genet Wilder OTR/L  License Number: [id number]

## 2024-05-08 NOTE — PROGRESS NOTES
4 Eyes Skin Assessment     NAME:  Cindy Wells  YOB: 1940  MEDICAL RECORD NUMBER:  [id number]    The patient is being assessed for  Admission    I agree that at least one RN has performed a thorough Head to Toe Skin Assessment on the patient. ALL assessment sites listed below have been assessed.      Areas assessed by both nurses:    Head, Face, Ears, Shoulders, Back, Chest, Arms, Elbows, Hands, Sacrum. Buttock, Coccyx, Ischium, and Legs. Feet and Heels        Does the Patient have a Wound? No noted wound(s)       Darrell Prevention initiated by RN: Yes  Wound Care Orders initiated by RN: No    Pressure Injury (Stage 3,4, Unstageable, DTI, NWPT, and Complex wounds) if present, place Wound referral order by RN under : No    New Ostomies, if present place, Ostomy referral order under : No     Nurse 1 eSignature: Electronically signed by Fabiola Hu RN on 5/8/24 at 3:33 AM EDT    **SHARE this note so that the co-signing nurse can place an eSignature**    Nurse 2 eSignature: Electronically signed by Bia Marc RN on 5/8/24 at 4:08 AM EDT

## 2024-05-08 NOTE — CONSULTS
[facility]  [facility]  Consultation Note     Admit Date: 5/7/2024  6:53 PM    Reason for Consult:   Positive blood cultures    Attending Physician:  Gurpreet Arevalo MD    HISTORY OF PRESENT ILLNESS:             The history is obtained from extensive review of available past medical records. The patient is a 84 y.o. female who is previously known to the ID service.  The patient presented to [facility] urgent care for a urinalysis on 5/8/2020.  She was complaining of dysuria.  She was found to be hypotensive so she was sent to the ED at [facility].  On presentation she had a blood pressure of 96/58.  She was afebrile.  White count was 16.1 ALT was 151 and AST was 174.  Creatinine was 1.7 BUN was 43.  Lactic acid was normal.  She was treated with Vancomycin and Cefepime.  Blood cultures have turned positive for GNR in 1 of 4 bottles.  This has been identified as E. coli.    Past Medical History:        Diagnosis Date    Anxiety     Cardiomyopathy ([facility])     CHF (congestive heart failure) ([facility])     follows with Dr Agarwal yearly, patient denies any cardiac symptoms presently.    CKD (chronic kidney disease) stage 3, GFR 30-59 ml/min ([facility])     Cognitive dysfunction     Depression     History of TIA (transient ischemic attack)     TIAs ~2002 noted on brain imaging done because pt was having imbalance    Hyperlipidemia     Hypertension     Thyroid disease      November 2023.  Admitted to [facility], ICU with altered mentation and fever.  She was being treated with Bactrim for dysuria as an outpatient.  Cefepime and Doxycycline.  Evaluate for bacteremia.  Blood and urine cultures grew Bactrim resistant E. coli.  Cefepime was changed to Ceftriaxone.  She was discharged on Cefdinir.    January 2022.  Admitted to [facility] with confusion and fever associated to dysuria.  Seen by Dr. Tsai for Pseudomonas septicemia.  Treated with Cefepime.  This was changed over to Levofloxacin  which she tolerated and spite of a previous ADR to Ciprofloxacin.  She was seen in the office in follow-up.     Past Surgical History:        Procedure Laterality Date    CHOLECYSTECTOMY, LAPAROSCOPIC N/A 2020    LAPAROSCOPIC CHOLECYSTECTOMY performed by John Porter MD at [facility] OR    ERCP N/A 9/15/2020    ERCP STENT INSERTION with BALLOON SWEEPING and PAPILLOTOMY performed by Molly Augustine MD at [facility] ENDOSCOPY    ERCP N/A 10/21/2020    ERCP STENT REMOVAL performed by Molly Augustine MD at [facility] ENDOSCOPY    HYSTERECTOMY (CERVIX STATUS UNKNOWN)      KNEE SURGERY      THYROID LOBECTOMY      TONSILLECTOMY       Current Medications:   Scheduled Meds:   topiramate  25 mg Oral Nightly    sucralfate  1 g Oral Q6H    pantoprazole  40 mg Oral Daily    metoprolol succinate  25 mg Oral Daily    levothyroxine  75 mcg Oral Daily    ferrous sulfate  325 mg Oral Q12H    donepezil  15 mg Oral Daily    cetirizine  10 mg Oral Daily    busPIRone  15 mg Oral BID    atorvastatin  40 mg Oral Nightly    aspirin  81 mg Oral Daily    sodium chloride flush  10 mL IntraVENous 2 times per day    enoxaparin  30 mg SubCUTAneous Daily    buPROPion  150 mg Oral Q12H    cefepime  1,000 mg IntraVENous Q24H    vancomycin  750 mg IntraVENous Q24H     Continuous Infusions:   sodium chloride      sodium chloride 75 mL/hr at 24 0239     PRN Meds:sodium chloride flush, sodium chloride, ondansetron **OR** ondansetron, senna, acetaminophen **OR** acetaminophen    Allergies:  Macrobid [nitrofurantoin monohyd macro], Macrobid [nitrofurantoin], Ciprofloxacin, Iodine, and Penicillins    Social History:   Social History     Socioeconomic History    Marital status: Single   Tobacco Use    Smoking status: Former     Current packs/day: 0.00     Average packs/day: 1 pack/day for 40.0 years (40.0 ttl pk-yrs)     Types: Cigarettes     Start date:      Quit date:      Years since quittin.3    Smokeless tobacco: Never   Vaping Use    Vaping Use:

## 2024-05-08 NOTE — ACP (ADVANCE CARE PLANNING)
Advance Care Planning   The patient has the following advanced directives on file:  Advance Directives       Power of  Living Will ACP-Advance Directive ACP-Power of     Not on File Not on File Not on File Not on File            The patient has appointed the following active healthcare agents:    Primary Decision Maker: Prisca Olson - Child - [phone]    The Patient has the following current code status:    Code Status: Full Code      Brent Escalona RN  5/8/2024

## 2024-05-08 NOTE — PLAN OF CARE
Problem: Discharge Planning  Goal: Discharge to home or other facility with appropriate resources  5/8/2024 0924 by Gallo Salomon RN  Outcome: Progressing  5/8/2024 0332 by Fabiola Hu RN  Outcome: Progressing     Problem: Skin/Tissue Integrity  Goal: Absence of new skin breakdown  Description: 1.  Monitor for areas of redness and/or skin breakdown  2.  Assess vascular access sites hourly  3.  Every 4-6 hours minimum:  Change oxygen saturation probe site  4.  Every 4-6 hours:  If on nasal continuous positive airway pressure, respiratory therapy assess nares and determine need for appliance change or resting period.  5/8/2024 0924 by Gallo Salomon RN  Outcome: Progressing  5/8/2024 0332 by Fabiola Hu RN  Outcome: Progressing     Problem: Safety - Adult  Goal: Free from fall injury  5/8/2024 0924 by Gallo Salomon RN  Outcome: Progressing  5/8/2024 0332 by Fabiola Hu RN  Outcome: Progressing     Problem: ABCDS Injury Assessment  Goal: Absence of physical injury  5/8/2024 0924 by Gallo Salomon RN  Outcome: Progressing  5/8/2024 0332 by Fabiola Hu RN  Outcome: Progressing     Problem: Chronic Conditions and Co-morbidities  Goal: Patient's chronic conditions and co-morbidity symptoms are monitored and maintained or improved  Outcome: Progressing

## 2024-05-08 NOTE — PROGRESS NOTES
Phoned daughter lakhwinder corona to go over home medication list. Daughter stated she is sleeping in bed and is unable to go over home meds at this time. She states to call her back tomorrow after 12 pm.     0305: perfect serve out to jim JULIO regarding medication administration.     0319: med list found from [facility] and meds reconciled. Discrepancies found regarding aricept 5mg and vitamin d 2000 IU; perfect serve out.

## 2024-05-09 PROBLEM — I48.91 ATRIAL FIBRILLATION (HCC): Status: ACTIVE | Noted: 2024-05-09

## 2024-05-09 LAB
ALBUMIN SERPL-MCNC: 2.8 G/DL (ref 3.5–5.2)
ALP SERPL-CCNC: 89 U/L (ref 35–104)
ALT SERPL-CCNC: 76 U/L (ref 0–32)
ANION GAP SERPL CALCULATED.3IONS-SCNC: 8 MMOL/L (ref 7–16)
AST SERPL-CCNC: 33 U/L (ref 0–31)
BASOPHILS # BLD: 0.04 K/UL (ref 0–0.2)
BASOPHILS NFR BLD: 0 % (ref 0–2)
BILIRUB SERPL-MCNC: <0.2 MG/DL (ref 0–1.2)
BUN SERPL-MCNC: 29 MG/DL (ref 6–23)
CALCIUM SERPL-MCNC: 8.2 MG/DL (ref 8.6–10.2)
CHLORIDE SERPL-SCNC: 118 MMOL/L (ref 98–107)
CO2 SERPL-SCNC: 17 MMOL/L (ref 22–29)
CREAT SERPL-MCNC: 1.3 MG/DL (ref 0.5–1)
CRP SERPL HS-MCNC: 71 MG/L (ref 0–5)
EKG ATRIAL RATE: 277 BPM
EKG Q-T INTERVAL: 368 MS
EKG QRS DURATION: 82 MS
EKG QTC CALCULATION (BAZETT): 440 MS
EKG R AXIS: -7 DEGREES
EKG T AXIS: -5 DEGREES
EKG VENTRICULAR RATE: 86 BPM
EOSINOPHIL # BLD: 0.31 K/UL (ref 0.05–0.5)
EOSINOPHILS RELATIVE PERCENT: 3 % (ref 0–6)
ERYTHROCYTE [DISTWIDTH] IN BLOOD BY AUTOMATED COUNT: 21.5 % (ref 11.5–15)
ERYTHROCYTE [SEDIMENTATION RATE] IN BLOOD BY WESTERGREN METHOD: 12 MM/HR (ref 0–20)
FERRITIN SERPL-MCNC: 115 NG/ML
GFR, ESTIMATED: 39 ML/MIN/1.73M2
GLUCOSE SERPL-MCNC: 100 MG/DL (ref 74–99)
HBA1C MFR BLD: 4.8 % (ref 4–5.6)
HCT VFR BLD AUTO: 32.8 % (ref 34–48)
HGB BLD-MCNC: 9.9 G/DL (ref 11.5–15.5)
IMM GRANULOCYTES # BLD AUTO: 0.04 K/UL (ref 0–0.58)
IMM GRANULOCYTES NFR BLD: 0 % (ref 0–5)
IRON SATN MFR SERPL: 7 % (ref 15–50)
IRON SERPL-MCNC: 13 UG/DL (ref 37–145)
LYMPHOCYTES NFR BLD: 1.84 K/UL (ref 1.5–4)
LYMPHOCYTES RELATIVE PERCENT: 19 % (ref 20–42)
MAGNESIUM SERPL-MCNC: 1.5 MG/DL (ref 1.6–2.6)
MCH RBC QN AUTO: 26.8 PG (ref 26–35)
MCHC RBC AUTO-ENTMCNC: 30.2 G/DL (ref 32–34.5)
MCV RBC AUTO: 88.9 FL (ref 80–99.9)
MONOCYTES NFR BLD: 1.03 K/UL (ref 0.1–0.95)
MONOCYTES NFR BLD: 10 % (ref 2–12)
NEUTROPHILS NFR BLD: 67 % (ref 43–80)
NEUTS SEG NFR BLD: 6.63 K/UL (ref 1.8–7.3)
PLATELET # BLD AUTO: 100 K/UL (ref 130–450)
PMV BLD AUTO: 10.6 FL (ref 7–12)
POTASSIUM SERPL-SCNC: 3.8 MMOL/L (ref 3.5–5)
PROT SERPL-MCNC: 4.6 G/DL (ref 6.4–8.3)
RBC # BLD AUTO: 3.69 M/UL (ref 3.5–5.5)
RBC # BLD: ABNORMAL 10*6/UL
SODIUM SERPL-SCNC: 143 MMOL/L (ref 132–146)
TIBC SERPL-MCNC: 198 UG/DL (ref 250–450)
WBC OTHER # BLD: 9.9 K/UL (ref 4.5–11.5)

## 2024-05-09 PROCEDURE — 86140 C-REACTIVE PROTEIN: CPT

## 2024-05-09 PROCEDURE — 6370000000 HC RX 637 (ALT 250 FOR IP): Performed by: PHYSICIAN ASSISTANT

## 2024-05-09 PROCEDURE — 85025 COMPLETE CBC W/AUTO DIFF WBC: CPT

## 2024-05-09 PROCEDURE — 2580000003 HC RX 258: Performed by: HOSPITALIST

## 2024-05-09 PROCEDURE — 6360000002 HC RX W HCPCS: Performed by: SPECIALIST

## 2024-05-09 PROCEDURE — 36415 COLL VENOUS BLD VENIPUNCTURE: CPT

## 2024-05-09 PROCEDURE — APPSS60 APP SPLIT SHARED TIME 46-60 MINUTES: Performed by: CLINICAL NURSE SPECIALIST

## 2024-05-09 PROCEDURE — 6360000002 HC RX W HCPCS: Performed by: CLINICAL NURSE SPECIALIST

## 2024-05-09 PROCEDURE — 83735 ASSAY OF MAGNESIUM: CPT

## 2024-05-09 PROCEDURE — 83550 IRON BINDING TEST: CPT

## 2024-05-09 PROCEDURE — 93005 ELECTROCARDIOGRAM TRACING: CPT | Performed by: HOSPITALIST

## 2024-05-09 PROCEDURE — 93010 ELECTROCARDIOGRAM REPORT: CPT | Performed by: INTERNAL MEDICINE

## 2024-05-09 PROCEDURE — 6360000002 HC RX W HCPCS: Performed by: PHYSICIAN ASSISTANT

## 2024-05-09 PROCEDURE — 83540 ASSAY OF IRON: CPT

## 2024-05-09 PROCEDURE — 85652 RBC SED RATE AUTOMATED: CPT

## 2024-05-09 PROCEDURE — 83036 HEMOGLOBIN GLYCOSYLATED A1C: CPT

## 2024-05-09 PROCEDURE — 82728 ASSAY OF FERRITIN: CPT

## 2024-05-09 PROCEDURE — 2060000000 HC ICU INTERMEDIATE R&B

## 2024-05-09 PROCEDURE — 80053 COMPREHEN METABOLIC PANEL: CPT

## 2024-05-09 PROCEDURE — 2580000003 HC RX 258: Performed by: SPECIALIST

## 2024-05-09 PROCEDURE — 99223 1ST HOSP IP/OBS HIGH 75: CPT | Performed by: INTERNAL MEDICINE

## 2024-05-09 PROCEDURE — 2580000003 HC RX 258: Performed by: PHYSICIAN ASSISTANT

## 2024-05-09 RX ORDER — ENOXAPARIN SODIUM 100 MG/ML
1 INJECTION SUBCUTANEOUS 2 TIMES DAILY
Status: DISCONTINUED | OUTPATIENT
Start: 2024-05-09 | End: 2024-05-10 | Stop reason: SDUPTHER

## 2024-05-09 RX ORDER — ENOXAPARIN SODIUM 100 MG/ML
50 INJECTION SUBCUTANEOUS ONCE
Status: COMPLETED | OUTPATIENT
Start: 2024-05-09 | End: 2024-05-09

## 2024-05-09 RX ORDER — MAGNESIUM SULFATE IN WATER 40 MG/ML
2000 INJECTION, SOLUTION INTRAVENOUS
Status: COMPLETED | OUTPATIENT
Start: 2024-05-09 | End: 2024-05-10

## 2024-05-09 RX ORDER — ENOXAPARIN SODIUM 100 MG/ML
1 INJECTION SUBCUTANEOUS 2 TIMES DAILY
Status: DISCONTINUED | OUTPATIENT
Start: 2024-05-09 | End: 2024-05-09 | Stop reason: SDUPTHER

## 2024-05-09 RX ADMIN — MAGNESIUM SULFATE HEPTAHYDRATE 2000 MG: 40 INJECTION, SOLUTION INTRAVENOUS at 16:47

## 2024-05-09 RX ADMIN — ENOXAPARIN SODIUM 30 MG: 100 INJECTION SUBCUTANEOUS at 08:06

## 2024-05-09 RX ADMIN — DONEPEZIL HYDROCHLORIDE 15 MG: 10 TABLET, FILM COATED ORAL at 22:08

## 2024-05-09 RX ADMIN — FERROUS SULFATE TAB 325 MG (65 MG ELEMENTAL FE) 325 MG: 325 (65 FE) TAB at 22:08

## 2024-05-09 RX ADMIN — ASPIRIN 81 MG CHEWABLE TABLET 81 MG: 81 TABLET CHEWABLE at 08:05

## 2024-05-09 RX ADMIN — ACETAMINOPHEN 650 MG: 325 TABLET ORAL at 03:11

## 2024-05-09 RX ADMIN — SUCRALFATE 1 G: 1 TABLET ORAL at 05:40

## 2024-05-09 RX ADMIN — ONDANSETRON 4 MG: 2 INJECTION INTRAMUSCULAR; INTRAVENOUS at 03:11

## 2024-05-09 RX ADMIN — SUCRALFATE 1 G: 1 TABLET ORAL at 12:00

## 2024-05-09 RX ADMIN — MAGNESIUM SULFATE HEPTAHYDRATE 2000 MG: 40 INJECTION, SOLUTION INTRAVENOUS at 22:15

## 2024-05-09 RX ADMIN — TOPIRAMATE 25 MG: 25 TABLET, FILM COATED ORAL at 22:16

## 2024-05-09 RX ADMIN — BUSPIRONE HYDROCHLORIDE 15 MG: 10 TABLET ORAL at 22:08

## 2024-05-09 RX ADMIN — BUSPIRONE HYDROCHLORIDE 15 MG: 10 TABLET ORAL at 08:06

## 2024-05-09 RX ADMIN — FERROUS SULFATE TAB 325 MG (65 MG ELEMENTAL FE) 325 MG: 325 (65 FE) TAB at 08:05

## 2024-05-09 RX ADMIN — BUPROPION HYDROCHLORIDE 150 MG: 150 TABLET, FILM COATED, EXTENDED RELEASE ORAL at 22:08

## 2024-05-09 RX ADMIN — PANTOPRAZOLE SODIUM 40 MG: 40 TABLET, DELAYED RELEASE ORAL at 08:06

## 2024-05-09 RX ADMIN — SODIUM CHLORIDE, PRESERVATIVE FREE 10 ML: 5 INJECTION INTRAVENOUS at 08:06

## 2024-05-09 RX ADMIN — ENOXAPARIN SODIUM 80 MG: 100 INJECTION SUBCUTANEOUS at 22:08

## 2024-05-09 RX ADMIN — ACETAMINOPHEN 650 MG: 325 TABLET ORAL at 22:11

## 2024-05-09 RX ADMIN — WATER 2000 MG: 1 INJECTION INTRAMUSCULAR; INTRAVENOUS; SUBCUTANEOUS at 12:00

## 2024-05-09 RX ADMIN — SUCRALFATE 1 G: 1 TABLET ORAL at 16:45

## 2024-05-09 RX ADMIN — LEVOTHYROXINE SODIUM 75 MCG: 75 TABLET ORAL at 05:40

## 2024-05-09 RX ADMIN — SODIUM CHLORIDE, PRESERVATIVE FREE 10 ML: 5 INJECTION INTRAVENOUS at 22:17

## 2024-05-09 RX ADMIN — BUPROPION HYDROCHLORIDE 150 MG: 150 TABLET, FILM COATED, EXTENDED RELEASE ORAL at 08:06

## 2024-05-09 RX ADMIN — ATORVASTATIN CALCIUM 40 MG: 40 TABLET, FILM COATED ORAL at 22:08

## 2024-05-09 RX ADMIN — SODIUM CHLORIDE: 9 INJECTION, SOLUTION INTRAVENOUS at 08:08

## 2024-05-09 RX ADMIN — CETIRIZINE HYDROCHLORIDE 10 MG: 10 TABLET, FILM COATED ORAL at 08:05

## 2024-05-09 RX ADMIN — ENOXAPARIN SODIUM 50 MG: 100 INJECTION SUBCUTANEOUS at 14:45

## 2024-05-09 RX ADMIN — SUCRALFATE 1 G: 1 TABLET ORAL at 00:08

## 2024-05-09 ASSESSMENT — PAIN SCALES - GENERAL
PAINLEVEL_OUTOF10: 0
PAINLEVEL_OUTOF10: 0
PAINLEVEL_OUTOF10: 8
PAINLEVEL_OUTOF10: 10

## 2024-05-09 ASSESSMENT — PAIN DESCRIPTION - LOCATION: LOCATION: HEAD

## 2024-05-09 NOTE — PLAN OF CARE
Problem: Discharge Planning  Goal: Discharge to home or other facility with appropriate resources  5/9/2024 0929 by Nati Marsh RN  Outcome: Progressing     Problem: Skin/Tissue Integrity  Goal: Absence of new skin breakdown  Description: 1.  Monitor for areas of redness and/or skin breakdown  2.  Assess vascular access sites hourly  3.  Every 4-6 hours minimum:  Change oxygen saturation probe site  4.  Every 4-6 hours:  If on nasal continuous positive airway pressure, respiratory therapy assess nares and determine need for appliance change or resting period.  5/9/2024 0929 by Nati Marsh RN  Outcome: Progressing     Problem: Safety - Adult  Goal: Free from fall injury  5/9/2024 0929 by Nati Marsh RN  Outcome: Progressing     Problem: ABCDS Injury Assessment  Goal: Absence of physical injury  5/9/2024 0929 by Nati Marsh RN  Outcome: Progressing     Problem: Chronic Conditions and Co-morbidities  Goal: Patient's chronic conditions and co-morbidity symptoms are monitored and maintained or improved  5/9/2024 0929 by Nati Marsh RN  Outcome: Progressing     Problem: Pain  Goal: Verbalizes/displays adequate comfort level or baseline comfort level  Outcome: Progressing

## 2024-05-09 NOTE — PROGRESS NOTES
[facility]  KARENIDA  Progress Note    SUBJECTIVE:  Chief Complaint   Patient presents with    Fatigue     States feels like she has a Uti, went to [facility] to get urine tested and was hypotensive 90's over 50s so they sent patient in     Patient is tolerating medications.  The patient is feeling better.  Tolerating antibiotic.    Review of systems:  As stated above in the chief complaint, otherwise negative.    Medications:  Scheduled Meds:   topiramate  25 mg Oral Nightly    sucralfate  1 g Oral Q6H    pantoprazole  40 mg Oral Daily    [Held by provider] metoprolol succinate  25 mg Oral Daily    levothyroxine  75 mcg Oral Daily    ferrous sulfate  325 mg Oral Q12H    donepezil  15 mg Oral Daily    cetirizine  10 mg Oral Daily    busPIRone  15 mg Oral BID    atorvastatin  40 mg Oral Nightly    aspirin  81 mg Oral Daily    sodium chloride flush  10 mL IntraVENous 2 times per day    enoxaparin  30 mg SubCUTAneous Daily    buPROPion  150 mg Oral Q12H    cefTRIAXone (ROCEPHIN) IV  2,000 mg IntraVENous Q24H     Continuous Infusions:   sodium chloride      sodium chloride 125 mL/hr at 24 0808     PRN Meds:sodium chloride flush, sodium chloride, ondansetron **OR** ondansetron, senna, acetaminophen **OR** acetaminophen    OBJECTIVE:  /79   Pulse 91   Temp 97.7 °F (36.5 °C) (Oral)   Resp 16   Ht 1.626 m (5' 4\")   Wt 75.3 kg (166 lb)   SpO2 98%   BMI 28.49 kg/m²   Temp  Av.9 °F (36.6 °C)  Min: 97.7 °F (36.5 °C)  Max: 98.2 °F (36.8 °C)  Constitutional:   Skin: Warm and dry. No rashes were noted.   Neuro: Alert and oriented  HEENT: Round and reactive pupils.  Moist mucous membranes.  No ulcerations or thrush.  Chest: .Respirations unlabored. Breath sounds clear.   Cardiovascular: Heart sounds rhythmic and regular.  Abdomen: Benign  Extremities: No edema    Lines: Peripheral      Laboratory and Tests:  Lab Results   Component Value Date    WBC 9.9 2024    WBC

## 2024-05-09 NOTE — CARE COORDINATION
Transition of care update. Per IDR today, patient is now in a fib. Cardiology consulted. Per cardiology note today, will obtain echocardiogram to guide therapy.  Lovenox anticoagulation for now and to switch to Eliquis prior to discharge.  Resuming beta-blocker. Etiology of A-fib likely due to urosepsis. To continue to treat underlying cause of infection. Currently on IV Rocephin. Patient for an echo. Spoke with daughter Prisca regarding HHC for skilled nursing and PT due to therapy evals. She is agreeable if needed, and their preference is [facility]. Referral given to Skye to follow. Discharge plan is home when medically stable, with HHC if needed, and daughter Prisca transporting. CM/SW will follow.  Electronically signed by Brent Escalona RN on 5/9/2024 at 3:33 PM

## 2024-05-09 NOTE — PLAN OF CARE
Problem: Discharge Planning  Goal: Discharge to home or other facility with appropriate resources  5/8/2024 2200 by Fabiola Hu RN  Outcome: Progressing  5/8/2024 0924 by Gallo Salomon RN  Outcome: Progressing     Problem: Skin/Tissue Integrity  Goal: Absence of new skin breakdown  Description: 1.  Monitor for areas of redness and/or skin breakdown  2.  Assess vascular access sites hourly  3.  Every 4-6 hours minimum:  Change oxygen saturation probe site  4.  Every 4-6 hours:  If on nasal continuous positive airway pressure, respiratory therapy assess nares and determine need for appliance change or resting period.  5/8/2024 2200 by Fabiola Hu RN  Outcome: Progressing  5/8/2024 0924 by Gallo Salomon RN  Outcome: Progressing     Problem: Safety - Adult  Goal: Free from fall injury  5/8/2024 2200 by Fabiola Hu RN  Outcome: Progressing  5/8/2024 0924 by Gallo Salomon RN  Outcome: Progressing     Problem: ABCDS Injury Assessment  Goal: Absence of physical injury  5/8/2024 2200 by Fabiola Hu RN  Outcome: Progressing  5/8/2024 0924 by Gallo Salomon RN  Outcome: Progressing     Problem: Chronic Conditions and Co-morbidities  Goal: Patient's chronic conditions and co-morbidity symptoms are monitored and maintained or improved  5/8/2024 2200 by Fabiola Hu RN  Outcome: Progressing  5/8/2024 0924 by Gallo Salomon RN  Outcome: Progressing

## 2024-05-09 NOTE — CONSULTS
drop in h/h  Elevated LFT  Bacteremia  UTI  Hypotension  Nonischemic cardiomyopathy  2017 LVEF 40% improved in 2018  Nonischemic stress test in 2017  HTN  HLD  Obesity  Remote TIA  Hypothyroidism  CKD  History of dilated common bile duct status post ERCP 2020 followed by laparoscopic cholecystectomy  Anxiety/cognitive dysfunction  Frequent falls  Admit 2022 urosepsis MRSA/Pseudomonas bacteremia.  Admit 11/2023 sepsis E. coli bacteremia, pneumonia, cystitis       Plan:   Will obtain echocardiogram to guide therapy  Lovenox anticoagulation for now and switch to Eliquis prior to discharge  Resume beta-blocker for anticoagulation and monitor blood pressure closely  Etiology of A-fib likely due to urosepsis  Please continue to treat underlying cause of infection  Consider holding aspirin since we are starting anticoagulation for now given age and risk of falls  Monitor liver function test closely while on statin therapy and if worsening liver function test consider holding statin  Consider PT OT review to reassess mobility and gait difficulties and to reassess risk of fall especially since we are going to initiate anticoagulation.  Eventual ischemic evaluation once recovered from infectious etiology and it        Armaan Bain MD  [facility] [place] [facility]

## 2024-05-09 NOTE — PROGRESS NOTES
Internal Medicine Progress Note    Patient's name: Cindy Wells  : 1940  Chief complaints (on day of admission): Fatigue (States feels like she has a Uti, went to [facility] to get urine tested and was hypotensive 90's over 50s so they sent patient in)  Admission date: 2024  Date of service: 2024   Room: [place] [address]  Primary care physician: Maryan Pink MD  Reason for visit: Follow-up for bacteremia    Subjective  Cindy was seen and examined. Patient having palpitations this AM. No fever, chills, cp, sob, nv. No n/v or HA. Eating and drinking a little better.     Went into afib this morning on EKG. Sees cardiology in the office. No change in breathing or cp.      Review of Systems  There are no new complaints of chest pain, shortness of breath, abdominal pain, nausea, vomiting, diarrhea, constipation.    Hospital Medications  Current Facility-Administered Medications   Medication Dose Route Frequency Provider Last Rate Last Admin    topiramate (TOPAMAX) tablet 25 mg  25 mg Oral Nightly Clark Garvin, PA-C   25 mg at 24    sucralfate (CARAFATE) tablet 1 g  1 g Oral Q6H Clark Garvin PA-C   1 g at 24 0540    pantoprazole (PROTONIX) tablet 40 mg  40 mg Oral Daily Bharathi, Clark, PA-C   40 mg at 24 0806    [Held by provider] metoprolol succinate (TOPROL XL) extended release tablet 25 mg  25 mg Oral Daily Clark Garvin PA-C        levothyroxine (SYNTHROID) tablet 75 mcg  75 mcg Oral Daily Bharathi, Clark, PA-C   75 mcg at 24 0540    ferrous sulfate (IRON 325) tablet 325 mg  325 mg Oral Q12H Clark Garvin, PA-C   325 mg at 24 0805    donepezil (ARICEPT) tablet 15 mg  15 mg Oral Daily [place], Clark, PA-C   15 mg at 24    cetirizine (ZYRTEC) tablet 10 mg  10 mg Oral Daily Clark Garvin PA-C   10 mg at 24 0805    busPIRone (BUSPAR) tablet 15 mg  15 mg Oral BID Clark Garvin PA-C   15 mg at 24 0806    atorvastatin (LIPITOR)

## 2024-05-10 ENCOUNTER — APPOINTMENT (OUTPATIENT)
Age: 84
End: 2024-05-10
Payer: MEDICARE

## 2024-05-10 VITALS
WEIGHT: 165 LBS | HEART RATE: 70 BPM | DIASTOLIC BLOOD PRESSURE: 80 MMHG | SYSTOLIC BLOOD PRESSURE: 180 MMHG | OXYGEN SATURATION: 100 % | BODY MASS INDEX: 28.17 KG/M2 | HEIGHT: 64 IN | TEMPERATURE: 97.8 F | RESPIRATION RATE: 16 BRPM

## 2024-05-10 LAB
ACB COMPLEX DNA BLD POS QL NAA+NON-PROBE: NOT DETECTED
ALBUMIN SERPL-MCNC: 3.4 G/DL (ref 3.5–5.2)
ALP SERPL-CCNC: 110 U/L (ref 35–104)
ALT SERPL-CCNC: 69 U/L (ref 0–32)
ANION GAP SERPL CALCULATED.3IONS-SCNC: 9 MMOL/L (ref 7–16)
AST SERPL-CCNC: 24 U/L (ref 0–31)
B FRAGILIS DNA BLD POS QL NAA+NON-PROBE: NOT DETECTED
BASOPHILS # BLD: 0 K/UL (ref 0–0.2)
BASOPHILS NFR BLD: 0 % (ref 0–2)
BILIRUB SERPL-MCNC: <0.2 MG/DL (ref 0–1.2)
BIOFIRE TEST COMMENT: ABNORMAL
BLACTX-M ISLT/SPM QL: NOT DETECTED
BLAIMP ISLT/SPM QL: NOT DETECTED
BLAKPC ISLT/SPM QL: NOT DETECTED
BLAOXA-48-LIKE ISLT/SPM QL: NOT DETECTED
BLAVIM ISLT/SPM QL: NOT DETECTED
BUN SERPL-MCNC: 20 MG/DL (ref 6–23)
C ALBICANS DNA BLD POS QL NAA+NON-PROBE: NOT DETECTED
C AURIS DNA BLD POS QL NAA+NON-PROBE: NOT DETECTED
C GATTII+NEOFOR DNA BLD POS QL NAA+N-PRB: NOT DETECTED
C GLABRATA DNA BLD POS QL NAA+NON-PROBE: NOT DETECTED
C KRUSEI DNA BLD POS QL NAA+NON-PROBE: NOT DETECTED
C PARAP DNA BLD POS QL NAA+NON-PROBE: NOT DETECTED
C TROPICLS DNA BLD POS QL NAA+NON-PROBE: NOT DETECTED
CALCIUM SERPL-MCNC: 9.1 MG/DL (ref 8.6–10.2)
CHLORIDE SERPL-SCNC: 118 MMOL/L (ref 98–107)
CO2 SERPL-SCNC: 18 MMOL/L (ref 22–29)
COLISTIN RES MCR-1 ISLT/SPM QL: NOT DETECTED
CREAT SERPL-MCNC: 1.3 MG/DL (ref 0.5–1)
E CLOAC COMP DNA BLD POS NAA+NON-PROBE: NOT DETECTED
E COLI DNA BLD POS QL NAA+NON-PROBE: DETECTED
E FAECALIS DNA BLD POS QL NAA+NON-PROBE: NOT DETECTED
E FAECIUM DNA BLD POS QL NAA+NON-PROBE: NOT DETECTED
ECHO AO ASC DIAM: 2.7 CM
ECHO AO ASCENDING AORTA INDEX: 1.5 CM/M2
ECHO AV AREA PEAK VELOCITY: 1.8 CM2
ECHO AV AREA VTI: 1.9 CM2
ECHO AV AREA/BSA PEAK VELOCITY: 1 CM2/M2
ECHO AV AREA/BSA VTI: 1.1 CM2/M2
ECHO AV CUSP MM: 1.4 CM
ECHO AV MEAN GRADIENT: 4 MMHG
ECHO AV MEAN VELOCITY: 0.9 M/S
ECHO AV PEAK GRADIENT: 8 MMHG
ECHO AV PEAK VELOCITY: 1.4 M/S
ECHO AV VELOCITY RATIO: 0.64
ECHO AV VTI: 31.3 CM
ECHO BSA: 1.78 M2
ECHO EST RA PRESSURE: 3 MMHG
ECHO LA DIAMETER INDEX: 2.5 CM/M2
ECHO LA DIAMETER: 4.5 CM
ECHO LA VOL A-L A2C: 51 ML (ref 22–52)
ECHO LA VOL A-L A4C: 64 ML (ref 22–52)
ECHO LA VOL MOD A2C: 50 ML (ref 22–52)
ECHO LA VOL MOD A4C: 59 ML (ref 22–52)
ECHO LA VOLUME AREA LENGTH: 60 ML
ECHO LA VOLUME INDEX A-L A2C: 28 ML/M2 (ref 16–34)
ECHO LA VOLUME INDEX A-L A4C: 36 ML/M2 (ref 16–34)
ECHO LA VOLUME INDEX AREA LENGTH: 33 ML/M2 (ref 16–34)
ECHO LA VOLUME INDEX MOD A2C: 28 ML/M2 (ref 16–34)
ECHO LA VOLUME INDEX MOD A4C: 33 ML/M2 (ref 16–34)
ECHO LV E' LATERAL VELOCITY: 10 CM/S
ECHO LV E' SEPTAL VELOCITY: 8 CM/S
ECHO LV EDV A2C: 60 ML
ECHO LV EDV A4C: 61 ML
ECHO LV EDV BP: 60 ML (ref 56–104)
ECHO LV EDV INDEX A4C: 34 ML/M2
ECHO LV EDV INDEX BP: 33 ML/M2
ECHO LV EDV NDEX A2C: 33 ML/M2
ECHO LV EJECTION FRACTION A2C: 66 %
ECHO LV EJECTION FRACTION A4C: 61 %
ECHO LV EJECTION FRACTION BIPLANE: 64 % (ref 55–100)
ECHO LV ESV A2C: 21 ML
ECHO LV ESV A4C: 23 ML
ECHO LV ESV BP: 22 ML (ref 19–49)
ECHO LV ESV INDEX A2C: 12 ML/M2
ECHO LV ESV INDEX A4C: 13 ML/M2
ECHO LV ESV INDEX BP: 12 ML/M2
ECHO LV FRACTIONAL SHORTENING: 35 % (ref 28–44)
ECHO LV INTERNAL DIMENSION DIASTOLE INDEX: 2.39 CM/M2
ECHO LV INTERNAL DIMENSION DIASTOLIC: 4.3 CM (ref 3.9–5.3)
ECHO LV INTERNAL DIMENSION SYSTOLIC INDEX: 1.56 CM/M2
ECHO LV INTERNAL DIMENSION SYSTOLIC: 2.8 CM
ECHO LV ISOVOLUMETRIC RELAXATION TIME (IVRT): 64.6 MS
ECHO LV IVSD: 0.8 CM (ref 0.6–0.9)
ECHO LV IVSS: 1.1 CM
ECHO LV MASS 2D: 105.3 G (ref 67–162)
ECHO LV MASS INDEX 2D: 58.5 G/M2 (ref 43–95)
ECHO LV POSTERIOR WALL DIASTOLIC: 0.8 CM (ref 0.6–0.9)
ECHO LV POSTERIOR WALL SYSTOLIC: 1.1 CM
ECHO LV RELATIVE WALL THICKNESS RATIO: 0.37
ECHO LVOT AREA: 3.1 CM2
ECHO LVOT AV VTI INDEX: 0.64
ECHO LVOT DIAM: 2 CM
ECHO LVOT MEAN GRADIENT: 1 MMHG
ECHO LVOT PEAK GRADIENT: 3 MMHG
ECHO LVOT PEAK VELOCITY: 0.9 M/S
ECHO LVOT STROKE VOLUME INDEX: 34.9 ML/M2
ECHO LVOT SV: 62.8 ML
ECHO LVOT VTI: 20 CM
ECHO MV "A" WAVE DURATION: 135 MSEC
ECHO MV A VELOCITY: 1.17 M/S
ECHO MV AREA PHT: 4.1 CM2
ECHO MV AREA VTI: 2.4 CM2
ECHO MV E DECELERATION TIME (DT): 167.2 MS
ECHO MV E VELOCITY: 1.39 M/S
ECHO MV E/A RATIO: 1.19
ECHO MV E/E' LATERAL: 13.9
ECHO MV E/E' RATIO (AVERAGED): 15.64
ECHO MV E/E' SEPTAL: 17.38
ECHO MV EROA PISA: 0.1 CM2
ECHO MV LVOT VTI INDEX: 1.34
ECHO MV MAX VELOCITY: 1.4 M/S
ECHO MV MEAN GRADIENT: 3 MMHG
ECHO MV MEAN VELOCITY: 0.9 M/S
ECHO MV PEAK GRADIENT: 8 MMHG
ECHO MV PRESSURE HALF TIME (PHT): 53.8 MS
ECHO MV REGURGITANT ALIASING (NYQUIST) VELOCITY: 34 CM/S
ECHO MV REGURGITANT RADIUS PISA: 0.63 CM
ECHO MV REGURGITANT VELOCITY PISA: 6.3 M/S
ECHO MV REGURGITANT VOLUME PISA: 31.87 ML
ECHO MV REGURGITANT VTIA: 236.9 CM
ECHO MV VTI: 26.7 CM
ECHO PULMONARY ARTERY END DIASTOLIC PRESSURE: 10 MMHG
ECHO PV MAX VELOCITY: 1 M/S
ECHO PV MEAN GRADIENT: 2 MMHG
ECHO PV MEAN VELOCITY: 0.6 M/S
ECHO PV PEAK GRADIENT: 4 MMHG
ECHO PV REGURGITANT MAX VELOCITY: 1.6 M/S
ECHO PV VTI: 19.1 CM
ECHO PVEIN PEAK D VELOCITY: 0.9 M/S
ECHO PVEIN PEAK S VELOCITY: 0.6 M/S
ECHO PVEIN S/D RATIO: 0.7
ECHO RIGHT VENTRICULAR SYSTOLIC PRESSURE (RVSP): 41 MMHG
ECHO RV INTERNAL DIMENSION: 3.7 CM
ECHO RV TAPSE: 2.2 CM (ref 1.7–?)
ECHO TV REGURGITANT MAX VELOCITY: 3.09 M/S
ECHO TV REGURGITANT PEAK GRADIENT: 38 MMHG
ENTEROBACTERALES DNA BLD POS NAA+N-PRB: DETECTED
EOSINOPHIL # BLD: 0.07 K/UL (ref 0.05–0.5)
EOSINOPHILS RELATIVE PERCENT: 1 % (ref 0–6)
ERYTHROCYTE [DISTWIDTH] IN BLOOD BY AUTOMATED COUNT: 21.2 % (ref 11.5–15)
GFR, ESTIMATED: 42 ML/MIN/1.73M2
GLUCOSE SERPL-MCNC: 111 MG/DL (ref 74–99)
GP B STREP DNA BLD POS QL NAA+NON-PROBE: NOT DETECTED
HAEM INFLU DNA BLD POS QL NAA+NON-PROBE: NOT DETECTED
HCT VFR BLD AUTO: 39.2 % (ref 34–48)
HGB BLD-MCNC: 11.9 G/DL (ref 11.5–15.5)
K OXYTOCA DNA BLD POS QL NAA+NON-PROBE: NOT DETECTED
KLEBSIELLA SP DNA BLD POS QL NAA+NON-PRB: NOT DETECTED
KLEBSIELLA SP DNA BLD POS QL NAA+NON-PRB: NOT DETECTED
L MONOCYTOG DNA BLD POS QL NAA+NON-PROBE: NOT DETECTED
LACTATE BLDV-SCNC: 0.9 MMOL/L (ref 0.5–1.9)
LYMPHOCYTES NFR BLD: 0.7 K/UL (ref 1.5–4)
LYMPHOCYTES RELATIVE PERCENT: 9 % (ref 20–42)
MCH RBC QN AUTO: 27.3 PG (ref 26–35)
MCHC RBC AUTO-ENTMCNC: 30.4 G/DL (ref 32–34.5)
MCV RBC AUTO: 89.9 FL (ref 80–99.9)
MICROORGANISM SPEC CULT: ABNORMAL
MICROORGANISM SPEC CULT: ABNORMAL
MICROORGANISM/AGENT SPEC: ABNORMAL
MONOCYTES NFR BLD: 0 % (ref 2–12)
MONOCYTES NFR BLD: 0 K/UL (ref 0.1–0.95)
N MEN DNA BLD POS QL NAA+NON-PROBE: NOT DETECTED
NEUTROPHILS NFR BLD: 90 % (ref 43–80)
NEUTS SEG NFR BLD: 7.23 K/UL (ref 1.8–7.3)
P AERUGINOSA DNA BLD POS NAA+NON-PROBE: NOT DETECTED
PLATELET # BLD AUTO: 112 K/UL (ref 130–450)
PMV BLD AUTO: 10.2 FL (ref 7–12)
POTASSIUM SERPL-SCNC: 3.8 MMOL/L (ref 3.5–5)
PROT SERPL-MCNC: 5.8 G/DL (ref 6.4–8.3)
PROTEUS SP DNA BLD POS QL NAA+NON-PROBE: NOT DETECTED
RBC # BLD AUTO: 4.36 M/UL (ref 3.5–5.5)
RBC # BLD: ABNORMAL 10*6/UL
RESISTANT GENE NDM BY PCR: NOT DETECTED
S AUREUS DNA BLD POS QL NAA+NON-PROBE: NOT DETECTED
S AUREUS+CONS DNA BLD POS NAA+NON-PROBE: NOT DETECTED
S EPIDERMIDIS DNA BLD POS QL NAA+NON-PRB: NOT DETECTED
S LUGDUNENSIS DNA BLD POS QL NAA+NON-PRB: NOT DETECTED
S MALTOPHILIA DNA BLD POS QL NAA+NON-PRB: NOT DETECTED
S MARCESCENS DNA BLD POS NAA+NON-PROBE: NOT DETECTED
S PNEUM DNA BLD POS QL NAA+NON-PROBE: NOT DETECTED
S PYO DNA BLD POS QL NAA+NON-PROBE: NOT DETECTED
SALMONELLA DNA BLD POS QL NAA+NON-PROBE: NOT DETECTED
SERVICE CMNT-IMP: ABNORMAL
SODIUM SERPL-SCNC: 145 MMOL/L (ref 132–146)
SPECIMEN DESCRIPTION: ABNORMAL
SPECIMEN DESCRIPTION: ABNORMAL
STREPTOCOCCUS DNA BLD POS NAA+NON-PROBE: NOT DETECTED
WBC OTHER # BLD: 8 K/UL (ref 4.5–11.5)

## 2024-05-10 PROCEDURE — 2580000003 HC RX 258: Performed by: PHYSICIAN ASSISTANT

## 2024-05-10 PROCEDURE — 83605 ASSAY OF LACTIC ACID: CPT

## 2024-05-10 PROCEDURE — 80053 COMPREHEN METABOLIC PANEL: CPT

## 2024-05-10 PROCEDURE — 6360000002 HC RX W HCPCS: Performed by: CLINICAL NURSE SPECIALIST

## 2024-05-10 PROCEDURE — 93306 TTE W/DOPPLER COMPLETE: CPT | Performed by: INTERNAL MEDICINE

## 2024-05-10 PROCEDURE — 93306 TTE W/DOPPLER COMPLETE: CPT

## 2024-05-10 PROCEDURE — 85025 COMPLETE CBC W/AUTO DIFF WBC: CPT

## 2024-05-10 PROCEDURE — 36415 COLL VENOUS BLD VENIPUNCTURE: CPT

## 2024-05-10 PROCEDURE — 6370000000 HC RX 637 (ALT 250 FOR IP): Performed by: PHYSICIAN ASSISTANT

## 2024-05-10 PROCEDURE — 6360000002 HC RX W HCPCS: Performed by: PHYSICIAN ASSISTANT

## 2024-05-10 PROCEDURE — 99233 SBSQ HOSP IP/OBS HIGH 50: CPT | Performed by: INTERNAL MEDICINE

## 2024-05-10 RX ORDER — LOSARTAN POTASSIUM 50 MG/1
50 TABLET ORAL DAILY
Qty: 90 TABLET | Refills: 1 | Status: SHIPPED | OUTPATIENT
Start: 2024-05-10

## 2024-05-10 RX ORDER — CEFDINIR 300 MG/1
300 CAPSULE ORAL EVERY 12 HOURS SCHEDULED
Qty: 14 CAPSULE | Refills: 0 | Status: SHIPPED | OUTPATIENT
Start: 2024-05-10 | End: 2024-05-17

## 2024-05-10 RX ORDER — CEFDINIR 300 MG/1
300 CAPSULE ORAL EVERY 12 HOURS SCHEDULED
Status: DISCONTINUED | OUTPATIENT
Start: 2024-05-10 | End: 2024-05-10 | Stop reason: HOSPADM

## 2024-05-10 RX ORDER — ENOXAPARIN SODIUM 100 MG/ML
1 INJECTION SUBCUTANEOUS 2 TIMES DAILY
Status: DISCONTINUED | OUTPATIENT
Start: 2024-05-10 | End: 2024-05-10

## 2024-05-10 RX ADMIN — SUCRALFATE 1 G: 1 TABLET ORAL at 00:21

## 2024-05-10 RX ADMIN — PANTOPRAZOLE SODIUM 40 MG: 40 TABLET, DELAYED RELEASE ORAL at 09:55

## 2024-05-10 RX ADMIN — CETIRIZINE HYDROCHLORIDE 10 MG: 10 TABLET, FILM COATED ORAL at 11:34

## 2024-05-10 RX ADMIN — ENOXAPARIN SODIUM 70 MG: 100 INJECTION SUBCUTANEOUS at 08:40

## 2024-05-10 RX ADMIN — BUPROPION HYDROCHLORIDE 150 MG: 150 TABLET, FILM COATED, EXTENDED RELEASE ORAL at 11:34

## 2024-05-10 RX ADMIN — LEVOTHYROXINE SODIUM 75 MCG: 75 TABLET ORAL at 06:14

## 2024-05-10 RX ADMIN — METOPROLOL SUCCINATE 25 MG: 25 TABLET, EXTENDED RELEASE ORAL at 09:55

## 2024-05-10 RX ADMIN — ONDANSETRON 4 MG: 2 INJECTION INTRAMUSCULAR; INTRAVENOUS at 08:43

## 2024-05-10 RX ADMIN — SODIUM CHLORIDE, PRESERVATIVE FREE 10 ML: 5 INJECTION INTRAVENOUS at 08:40

## 2024-05-10 RX ADMIN — ONDANSETRON 4 MG: 2 INJECTION INTRAMUSCULAR; INTRAVENOUS at 02:27

## 2024-05-10 RX ADMIN — SUCRALFATE 1 G: 1 TABLET ORAL at 11:34

## 2024-05-10 RX ADMIN — SODIUM CHLORIDE: 9 INJECTION, SOLUTION INTRAVENOUS at 04:18

## 2024-05-10 RX ADMIN — BUSPIRONE HYDROCHLORIDE 15 MG: 10 TABLET ORAL at 11:34

## 2024-05-10 ASSESSMENT — PAIN SCALES - GENERAL
PAINLEVEL_OUTOF10: 3
PAINLEVEL_OUTOF10: 0

## 2024-05-10 NOTE — PROGRESS NOTES
Internal Medicine Progress Note    Patient's name: Cindy Wells  : 1940  Chief complaints (on day of admission): Fatigue (States feels like she has a Uti, went to [facility] to get urine tested and was hypotensive 90's over 50s so they sent patient in)  Admission date: 2024  Date of service: 5/10/2024   Room: [place] [address]  Primary care physician: Maryan Pink MD  Reason for visit: Follow-up for bacteremia    Subjective  Cindy was seen and examined.  She is resting comfortably in bed no acute distress nontoxic in appearance.  She states she is leaving today because her granddaughter has prom and she wants to see her for that event.  She states she did vomit after receiving a pill last night which does happen to her from time to time.  She states she feels well at this time.  Denies any new palpitations or chest pain.  Overall she feels well and is ready to leave.  She had an echocardiogram done today which is not read yet.    Review of Systems  There are no new complaints of chest pain, shortness of breath, abdominal pain, nausea, vomiting, diarrhea, constipation.    Hospital Medications  Current Facility-Administered Medications   Medication Dose Route Frequency Provider Last Rate Last Admin    enoxaparin (LOVENOX) injection 70 mg  1 mg/kg SubCUTAneous BID Jasmine Ayala APRN - CNS   70 mg at 05/10/24 0840    topiramate (TOPAMAX) tablet 25 mg  25 mg Oral Nightly [place], Clark, PA-C   25 mg at 24 2216    sucralfate (CARAFATE) tablet 1 g  1 g Oral Q6H Bharathi, Clark, PA-C   1 g at 05/10/24 0021    pantoprazole (PROTONIX) tablet 40 mg  40 mg Oral Daily [place], Clark, PA-C   40 mg at 05/10/24 0955    metoprolol succinate (TOPROL XL) extended release tablet 25 mg  25 mg Oral Daily Bharathi, Clark, PA-C   25 mg at 05/10/24 0955    levothyroxine (SYNTHROID) tablet 75 mcg  75 mcg Oral Daily [place], Clark, PA-C   75 mcg at 05/10/24 0614    ferrous sulfate (IRON 325) tablet 325 mg

## 2024-05-10 NOTE — CARE COORDINATION
Transition of care update. Currently on IV Rocephin, but per ID note, awaiting for final ID and sensitivities for E. Coli. They are hoping to switch to an oral antibiotic today and discharged home. Per cardiology, patient to be discharged on Eliquis. Eliquis card given to patient. Spoke with daughter Barby on speaker phone with patient. Updated regarding Eliquis card, and both are in agreement for [facility] for skilled nursing and PT. [facility] is able to accept per Aiyana , and will contact daughter and patient with start of care. HHC orders in Epic. Discharge plan is home with HHC, and daughter transporting. CM will follow.  Electronically signed by Brent Escalona RN on 5/10/2024 at 11:29 AM

## 2024-05-10 NOTE — PLAN OF CARE
Problem: Discharge Planning  Goal: Discharge to home or other facility with appropriate resources  5/10/2024 0925 by Idalia Obregon RN  Outcome: Progressing  5/10/2024 0034 by Fabiola Hu RN  Outcome: Progressing     Problem: Skin/Tissue Integrity  Goal: Absence of new skin breakdown  Description: 1.  Monitor for areas of redness and/or skin breakdown  2.  Assess vascular access sites hourly  3.  Every 4-6 hours minimum:  Change oxygen saturation probe site  4.  Every 4-6 hours:  If on nasal continuous positive airway pressure, respiratory therapy assess nares and determine need for appliance change or resting period.  5/10/2024 0925 by Idalia Obregon RN  Outcome: Progressing  5/10/2024 0034 by Fabiola Hu RN  Outcome: Progressing     Problem: Safety - Adult  Goal: Free from fall injury  5/10/2024 0925 by Idalia Obregon RN  Outcome: Progressing  5/10/2024 0034 by Fabiola Hu RN  Outcome: Progressing     Problem: ABCDS Injury Assessment  Goal: Absence of physical injury  5/10/2024 0925 by Idalia Obregon RN  Outcome: Progressing  5/10/2024 0034 by Fabiola Hu RN  Outcome: Progressing     Problem: Chronic Conditions and Co-morbidities  Goal: Patient's chronic conditions and co-morbidity symptoms are monitored and maintained or improved  5/10/2024 0925 by Idalia Obregon RN  Outcome: Progressing  5/10/2024 0034 by Fabiola Hu RN  Outcome: Progressing     Problem: Pain  Goal: Verbalizes/displays adequate comfort level or baseline comfort level  5/10/2024 0925 by Idalia Obregon RN  Outcome: Progressing  5/10/2024 0034 by Fabiola Hu RN  Outcome: Progressing

## 2024-05-10 NOTE — PLAN OF CARE
Problem: Discharge Planning  Goal: Discharge to home or other facility with appropriate resources  5/10/2024 1420 by Idalia Obregon RN  Outcome: Completed  5/10/2024 0925 by Idalia Obregon RN  Outcome: Progressing  5/10/2024 0034 by Fabiola Hu RN  Outcome: Progressing     Problem: Skin/Tissue Integrity  Goal: Absence of new skin breakdown  Description: 1.  Monitor for areas of redness and/or skin breakdown  2.  Assess vascular access sites hourly  3.  Every 4-6 hours minimum:  Change oxygen saturation probe site  4.  Every 4-6 hours:  If on nasal continuous positive airway pressure, respiratory therapy assess nares and determine need for appliance change or resting period.  5/10/2024 1420 by Idalia Obregon RN  Outcome: Completed  5/10/2024 0925 by Idalia Obregon RN  Outcome: Progressing  5/10/2024 0034 by Fabiola Hu RN  Outcome: Progressing     Problem: Safety - Adult  Goal: Free from fall injury  5/10/2024 1420 by Idalia Obregon RN  Outcome: Completed  5/10/2024 0925 by Idalia Obregon RN  Outcome: Progressing  5/10/2024 0034 by Fabiola Hu RN  Outcome: Progressing     Problem: ABCDS Injury Assessment  Goal: Absence of physical injury  5/10/2024 1420 by Idalia Obregon RN  Outcome: Completed  5/10/2024 0925 by Idalia Obregon RN  Outcome: Progressing  5/10/2024 0034 by Fabiola Hu RN  Outcome: Progressing     Problem: Chronic Conditions and Co-morbidities  Goal: Patient's chronic conditions and co-morbidity symptoms are monitored and maintained or improved  5/10/2024 1420 by Idalia Obregon RN  Outcome: Completed  5/10/2024 0925 by Idalia Obregon RN  Outcome: Progressing  5/10/2024 0034 by Fabiola Hu RN  Outcome: Progressing     Problem: Pain  Goal: Verbalizes/displays adequate comfort level or baseline comfort level  5/10/2024 1420 by Idalia Obregon RN  Outcome: Completed  5/10/2024 0925 by Idalia Obregon RN  Outcome: Progressing  5/10/2024 0034 by Fabiola Hu RN  Outcome: Progressing

## 2024-05-10 NOTE — PROGRESS NOTES
[facility]  KARENIDA  Progress Note    SUBJECTIVE:  Chief Complaint   Patient presents with    Fatigue     States feels like she has a Uti, went to [facility] to get urine tested and was hypotensive 90's over 50s so they sent patient in     Patient is tolerating medications. No reported adverse drug reactions.  Says the carafate gives her an upset stomach and makes her nauseated.  No fevers   No diarrhea     Review of systems:  As stated above in the chief complaint, otherwise negative.    Medications:  Scheduled Meds:   enoxaparin  1 mg/kg SubCUTAneous BID    cefdinir  300 mg Oral 2 times per day    topiramate  25 mg Oral Nightly    sucralfate  1 g Oral Q6H    pantoprazole  40 mg Oral Daily    metoprolol succinate  25 mg Oral Daily    levothyroxine  75 mcg Oral Daily    ferrous sulfate  325 mg Oral Q12H    donepezil  15 mg Oral Daily    cetirizine  10 mg Oral Daily    busPIRone  15 mg Oral BID    atorvastatin  40 mg Oral Nightly    [Held by provider] aspirin  81 mg Oral Daily    sodium chloride flush  10 mL IntraVENous 2 times per day    buPROPion  150 mg Oral Q12H     Continuous Infusions:   sodium chloride 125 mL/hr at 05/10/24 0418     PRN Meds:sodium chloride flush, sodium chloride, ondansetron **OR** ondansetron, senna, acetaminophen **OR** acetaminophen    OBJECTIVE:  BP (!) 154/93   Pulse 73   Temp 97.3 °F (36.3 °C) (Temporal)   Resp 16   Ht 1.626 m (5' 4\")   Wt 74.8 kg (165 lb)   SpO2 100%   BMI 28.32 kg/m²   Temp  Av.8 °F (36.6 °C)  Min: 97.3 °F (36.3 °C)  Max: 98 °F (36.7 °C)  Constitutional: The patient is awake, alert, and oriented. Sitting up in bed, eating lunch  Skin: Warm and dry. No rashes were noted.   HEENT: Round and reactive pupils.  Moist mucous membranes.  No ulcerations or thrush.  Neck: Supple to movements.   Chest: No use of accessory muscles to breathe. Symmetrical expansion.  No wheezing, crackles or rhonchi.  Cardiovascular: S1 and S2 are  rhythmic and regular. No murmurs appreciated.   Abdomen: Positive bowel sounds to auscultation. Benign to palpation. No masses felt.  Extremities: No edema.  Lines: Peripheral.    Laboratory and Tests:  Lab Results   Component Value Date    CRP 71.0 (H) 05/09/2024    .0 (H) 11/28/2023    .0 (H) 11/27/2023     Lab Results   Component Value Date    SEDRATE 12 05/09/2024    SEDRATE 54 (H) 11/28/2023    SEDRATE 27 (H) 11/27/2023       Radiology:  Reviewed     Microbiology:   Rapid SARS-CoV-2: Negative  Blood cultures 5/7/2024: E. coli in 1 of 4 bottles  Urine culture >100K E.coli     ASSESSMENT:  Complicated UTI with sepsis.  Patient presents with emphysematous cystitis  E. coli septicemia secondary to complicated UTI  Leukocytosis secondary to complicated UTI-improved  Elevation of transaminases, improved    PLAN:  Consolidate antibiotics to Cefdinir x 7 more days - reconciled  Labs and cultures reviewed  Ok to discharge from ID standpoint    CRISTIAN Escalante - CNP  12:16 PM  5/10/2024  .  Patient seen and examined. I had a face to face encounter with the patient. Agree with exam.  Assessment and plan as outlined above and directed by me. Addition and corrections were done as deemed appropriate. My exam and plan include: Patient is doing well.  She had nausea and vomited  with what appears to be Carafate.  Otherwise tolerating antibiotics.  She can go home on 7 days of Cefdinir.  Follow-up with PCP.    Shubham Calles MD  5/10/2024  12:27 PM

## 2024-05-10 NOTE — PROGRESS NOTES
Inpatient Cardiology Consultation      Reason for Consult:  New AF     Consulting Physician: Dr Bain     Requesting Physician:  Dr. Arevalo     Date of Consultation: 5/10/2024    HISTORY OF PRESENT ILLNESS:   Cindy Wells  is a 84 y.o.  female known to Dr. Agarwal - has not been seen in office since 2019     Interim:  Echo shows normal systolic function  Follow-up with your cardiologist in the outpatient.      Past Medical History:    Nonischemic cardiomyopathy  2017 LVEF 40% improved in 2018  Nonischemic stress test in 2017  HTN  HLD on statin  Mild obesity BMI 28.5  Remote TIA  Hypothyroidism, on HRT  CKD baseline creatinine 1.2-1.6  Former tobacco abuse 1 pack a day for 40 years quit in 2007  History of dilated common bile duct status post ERCP 2020 followed by laparoscopic cholecystectomy  Anxiety/cognitive dysfunction  ? Frequent falls--ambulates with cane  Admit 2022 urosepsis MRSA/Pseudomonas bacteremia.  Admit 11/2023 sepsis E. coli bacteremia, pneumonia, cystitis    Available cardiac testing:    TTE 2017 Dr. Agarwal: LVEF 40%   Summary   Left ventricular internal dimensions were normal in diastole and systole.   Borderline concentric left ventricular hypertrophy.   No regional wall motion abnormalities seen.   Overall ejection fraction moderately decreased .   There is doppler evidence of stage I diastolic dysfunction.   Mild mitral regurgitation is present.       2017 nuclear medicine stress test:  IMPRESSION:  1. A small fixed defect in the distal anterior wall which may  represent breast attenuation artifact versus a previous infarct.  Clinical assessment is recommended.  LVEF 48%     2. No evidence of stress-induced left ventricular myocardial ischemia.      2018 TTE Dr. Agarwal:  Summary   Left ventricular internal dimensions were normal in diastole and systole.   No regional wall motion abnormalities seen.   Normal left ventricular ejection fraction.   Mild mitral regurgitation is present.

## 2024-05-10 NOTE — CONSULTS
Nutrition Assessment    Type and Reason for Visit:  Initial, Consult    Nutrition Recommendations/Plan:   Continue current diet and ONS, as tolerated     Nutrition Assessment:    Pt admit 2/2 E. coli septicemia secondary to complicated UTI. Had N/V with Carafate but otherwise tolerating meds. Currently, no N/V/D. PMHx=CHF, CKD, TIAs. Discharge planning in progress for today.    Nutrition Related Findings:    A&Ox4/disoriented at times, soft abd +BS, -I/O 3.2L, trace edema Wound Type: None       Current Nutrition Intake & Therapies:    Average Meal Intake: 26-50%, 51-75%  Average Supplements Intake: None Ordered  ADULT DIET; Regular  ADULT ORAL NUTRITION SUPPLEMENT; Breakfast, Dinner; Standard 4 oz Oral Supplement    Anthropometric Measures:  Height: 162.6 cm (5' 4\")  Ideal Body Weight (IBW): 120 lbs (55 kg)       Current Body Weight: 74.8 kg (164 lb 14.5 oz), 137.4 % IBW. Weight Source: Bed Scale (5/10)  Current BMI (kg/m2): 28.3                          BMI Categories: Overweight (BMI 25.0-29.9)    Nutrition Interventions:   Food and/or Nutrient Delivery: Continue Current Diet, Start Oral Nutrition Supplement (Ensure Compact BID)  Nutrition Education/Counseling: No recommendation at this time  Coordination of Nutrition Care: Continue to monitor while inpatient       Goals:     Goals: PO intake 75% or greater, by next RD assessment       Nutrition Monitoring and Evaluation:      Food/Nutrient Intake Outcomes: Food and Nutrient Intake, Supplement Intake  Physical Signs/Symptoms Outcomes: Biochemical Data, GI Status, Fluid Status or Edema, Nutrition Focused Physical Findings, Skin, Weight    Discharge Planning:    No discharge needs at this time     Purvi Avila RD, CNSC, LD  Contact: x [phone]

## 2024-05-10 NOTE — PROGRESS NOTES
SPIRITUAL HEALTH SERVICES - JILL Rodrigues Encounter    Name: Cindy Wells                  Referral: Routine Visit    Sacraments  Anointed (Last Rites): Yes  Apostolic [place]: No  Confession: No  Communion: Yes     Assessment:  Patient receptive to  visit.      Intervention:   provided spiritual support and sacramental ministry for patient.     Outcome:  Patient expressed gratitude for visit.    Plan:  Chaplains will remain available to offer spiritual and emotional support as needed.      Electronically signed by Chaplain Jas, on 5/10/2024 at 4:06 PM.  Spiritual Care Department  [facility]  [phone]

## 2024-05-10 NOTE — DISCHARGE SUMMARY
Internal Medicine Discharge Summary    NAME: Cindy Wells :  1940  MRN:  [id number] PCP:Maryan Pink MD    ADMITTED: 2024   DISCHARGED: No discharge date for patient encounter.    ADMITTING PHYSICIAN: Gurpreet Arevalo MD    PCP: Maryan Pink MD    CONSULTANT(S):   PHARMACY TO DOSE VANCOMYCIN  IP CONSULT TO INFECTIOUS DISEASES  IP CONSULT TO CARDIOLOGY  IP CONSULT TO DIETITIAN  IP CONSULT TO HOME CARE NEEDS     ADMITTING DIAGNOSIS:   UTI (urinary tract infection) [N39.0]  Transaminitis [R74.01]  General weakness [R53.1]  DANIEL (acute kidney injury) (HCC) [N17.9]  Hypotension, unspecified hypotension type [I95.9]     Please see H&P for further details    DISCHARGE DIAGNOSES:   Active Hospital Problems    Diagnosis     E coli bacteremia [R78.81, B96.20]      Priority: High    Essential hypertension [I10]      Priority: Low    Hypothyroidism [E03.9]      Priority: Low    Atrial fibrillation (HCC) [I48.91]     Complicated UTI (urinary tract infection) [N39.0]     CKD (chronic kidney disease) stage 3, GFR 30-59 ml/min (HCC) [N18.30]     Pure hypercholesterolemia [E78.00]     Nonischemic cardiomyopathy (HCC) [I42.8]     Chronic combined systolic and diastolic HF (heart failure) (HCC) [I50.42]     History of TIA (transient ischemic attack) [Z86.73]        BRIEF HISTORY OF PRESENT ILLNESS: Cindy Wells is a 84 y.o. female patient of Maryan Pink MD who  has a past medical history of Anxiety, Cardiomyopathy (HCC), CHF (congestive heart failure) (HCC), CKD (chronic kidney disease) stage 3, GFR 30-59 ml/min (HCC), Cognitive dysfunction, Depression, History of TIA (transient ischemic attack), Hyperlipidemia, Hypertension, and Thyroid disease. who originally had concerns including Fatigue (States feels like she has a Uti, went to [facility] to get urine tested and was hypotensive 90's over 50s so they sent patient in). at presentation on 2024, and was found to have UTI (urinary tract infection)

## 2024-05-10 NOTE — PROGRESS NOTES
CLINICAL PHARMACY NOTE: MEDS TO BEDS    Total # of Prescriptions Filled: 2   The following medications were delivered to the patient:  Eliquis 5  Cefdinir 300    Additional Documentation:

## 2024-05-12 LAB
MICROORGANISM SPEC CULT: NORMAL
SERVICE CMNT-IMP: NORMAL
SPECIMEN DESCRIPTION: NORMAL

## 2024-11-11 ENCOUNTER — OFFICE VISIT (OUTPATIENT)
Dept: GERIATRIC MEDICINE | Age: 84
End: 2024-11-11
Payer: MEDICARE

## 2024-11-11 VITALS
DIASTOLIC BLOOD PRESSURE: 79 MMHG | WEIGHT: 157.1 LBS | OXYGEN SATURATION: 97 % | RESPIRATION RATE: 20 BRPM | SYSTOLIC BLOOD PRESSURE: 118 MMHG | TEMPERATURE: 97.8 F | BODY MASS INDEX: 26.97 KG/M2 | HEART RATE: 60 BPM

## 2024-11-11 DIAGNOSIS — G31.84 MCI (MILD COGNITIVE IMPAIRMENT): Primary | ICD-10-CM

## 2024-11-11 PROCEDURE — 1123F ACP DISCUSS/DSCN MKR DOCD: CPT | Performed by: INTERNAL MEDICINE

## 2024-11-11 PROCEDURE — 1159F MED LIST DOCD IN RCRD: CPT | Performed by: INTERNAL MEDICINE

## 2024-11-11 PROCEDURE — G8427 DOCREV CUR MEDS BY ELIG CLIN: HCPCS | Performed by: INTERNAL MEDICINE

## 2024-11-11 PROCEDURE — G8419 CALC BMI OUT NRM PARAM NOF/U: HCPCS | Performed by: INTERNAL MEDICINE

## 2024-11-11 PROCEDURE — 99213 OFFICE O/P EST LOW 20 MIN: CPT | Performed by: INTERNAL MEDICINE

## 2024-11-11 PROCEDURE — 1036F TOBACCO NON-USER: CPT | Performed by: INTERNAL MEDICINE

## 2024-11-11 PROCEDURE — 1090F PRES/ABSN URINE INCON ASSESS: CPT | Performed by: INTERNAL MEDICINE

## 2024-11-11 PROCEDURE — G8484 FLU IMMUNIZE NO ADMIN: HCPCS | Performed by: INTERNAL MEDICINE

## 2024-11-11 PROCEDURE — 1160F RVW MEDS BY RX/DR IN RCRD: CPT | Performed by: INTERNAL MEDICINE

## 2024-11-11 RX ORDER — MULTIVIT WITH MINERALS/LUTEIN
1000 TABLET ORAL DAILY
COMMUNITY

## 2024-11-11 RX ORDER — GLUCOSAMINE HCL 500 MG
1 TABLET ORAL DAILY
COMMUNITY

## 2024-11-11 RX ORDER — ZINC OXIDE 13 %
1 CREAM (GRAM) TOPICAL DAILY
COMMUNITY

## 2024-11-11 RX ORDER — CHLORAL HYDRATE 500 MG
1 CAPSULE ORAL DAILY
COMMUNITY

## 2024-11-11 NOTE — PROGRESS NOTES
CC Recheck memory    Hx of MCI  And may not be progressing  Likes selct foods and persnickedy  Namenda caused diarrhea   Now on Aricept 10 mg a day plus 5 mg a day  Was on Exelon   Now BM's OK  And IADL's mostly daughter  NO DRIVING  Here with live in daughter  ADL's independent and doing make up  May sleep at night    Impression: MCI stable                      Namenda adverse GI effect     Plan; Aricept 15 mg a day

## 2024-11-11 NOTE — PROGRESS NOTES
Chief Complaint   Patient presents with    Follow-up     2nd visit.  Last seen in May.  Here with daughter, Prisca.    Discuss Medications     Pt is unsure what her Donepezil dose is.  Daughter will check med bottles at home and call with update.      Dr Garcia notified of above.

## 2024-11-12 ENCOUNTER — TELEPHONE (OUTPATIENT)
Dept: GERIATRIC MEDICINE | Age: 84
End: 2024-11-12

## 2024-11-12 RX ORDER — DONEPEZIL HYDROCHLORIDE 10 MG/1
TABLET, FILM COATED ORAL
Qty: 90 TABLET | Refills: 3 | Status: SHIPPED | OUTPATIENT
Start: 2024-11-12

## 2024-11-12 RX ORDER — DONEPEZIL HYDROCHLORIDE 5 MG/1
TABLET, FILM COATED ORAL
Qty: 90 TABLET | Refills: 3 | Status: SHIPPED | OUTPATIENT
Start: 2024-11-12

## 2024-11-12 NOTE — TELEPHONE ENCOUNTER
Patient called and confirmed medication dose for Aricept. Patient takes 10mg and 5 mg daily. Will need medications sent to [facility].

## 2025-06-05 ENCOUNTER — HOSPITAL ENCOUNTER (INPATIENT)
Age: 85
LOS: 4 days | Discharge: HOME HEALTH | DRG: 683 | End: 2025-06-09
Attending: EMERGENCY MEDICINE | Admitting: HOSPITALIST
Payer: COMMERCIAL

## 2025-06-05 DIAGNOSIS — E87.5 HYPERKALEMIA: ICD-10-CM

## 2025-06-05 DIAGNOSIS — N17.9 ACUTE RENAL FAILURE, UNSPECIFIED ACUTE RENAL FAILURE TYPE: Primary | ICD-10-CM

## 2025-06-05 DIAGNOSIS — E83.42 HYPOMAGNESEMIA: ICD-10-CM

## 2025-06-05 LAB
ALBUMIN SERPL-MCNC: 3.2 G/DL (ref 3.5–5.2)
ALP SERPL-CCNC: 81 U/L (ref 35–104)
ALT SERPL-CCNC: 6 U/L (ref 0–32)
ANION GAP SERPL CALCULATED.3IONS-SCNC: 10 MMOL/L (ref 7–16)
ANION GAP SERPL CALCULATED.3IONS-SCNC: 11 MMOL/L (ref 7–16)
AST SERPL-CCNC: 14 U/L (ref 0–31)
BASOPHILS # BLD: 0.08 K/UL (ref 0–0.2)
BASOPHILS NFR BLD: 2 % (ref 0–2)
BILIRUB SERPL-MCNC: <0.2 MG/DL (ref 0–1.2)
BUN SERPL-MCNC: 27 MG/DL (ref 6–23)
BUN SERPL-MCNC: 28 MG/DL (ref 6–23)
CALCIUM SERPL-MCNC: 8.9 MG/DL (ref 8.6–10.2)
CALCIUM SERPL-MCNC: 8.9 MG/DL (ref 8.6–10.2)
CHLORIDE SERPL-SCNC: 107 MMOL/L (ref 98–107)
CHLORIDE SERPL-SCNC: 108 MMOL/L (ref 98–107)
CHLORIDE UR-SCNC: 56 MMOL/L
CHP ED QC CHECK: NORMAL
CO2 SERPL-SCNC: 14 MMOL/L (ref 22–29)
CO2 SERPL-SCNC: 15 MMOL/L (ref 22–29)
CREAT SERPL-MCNC: 2 MG/DL (ref 0.5–1)
CREAT SERPL-MCNC: 2.2 MG/DL (ref 0.5–1)
CREAT UR-MCNC: 18.1 MG/DL (ref 29–226)
EKG ATRIAL RATE: 53 BPM
EKG P AXIS: 51 DEGREES
EKG P-R INTERVAL: 210 MS
EKG Q-T INTERVAL: 434 MS
EKG QRS DURATION: 86 MS
EKG QTC CALCULATION (BAZETT): 407 MS
EKG R AXIS: 1 DEGREES
EKG T AXIS: 49 DEGREES
EKG VENTRICULAR RATE: 53 BPM
EOSINOPHIL # BLD: 0.26 K/UL (ref 0.05–0.5)
EOSINOPHILS RELATIVE PERCENT: 5 % (ref 0–6)
ERYTHROCYTE [DISTWIDTH] IN BLOOD BY AUTOMATED COUNT: 15.5 % (ref 11.5–15)
GFR, ESTIMATED: 21 ML/MIN/1.73M2
GFR, ESTIMATED: 24 ML/MIN/1.73M2
GLUCOSE BLD-MCNC: 109 MG/DL
GLUCOSE BLD-MCNC: 109 MG/DL (ref 74–99)
GLUCOSE BLD-MCNC: 112 MG/DL (ref 74–99)
GLUCOSE SERPL-MCNC: 118 MG/DL (ref 74–99)
GLUCOSE SERPL-MCNC: 96 MG/DL (ref 74–99)
HCT VFR BLD AUTO: 34.6 % (ref 34–48)
HGB BLD-MCNC: 10.2 G/DL (ref 11.5–15.5)
IMM GRANULOCYTES # BLD AUTO: <0.03 K/UL (ref 0–0.58)
IMM GRANULOCYTES NFR BLD: 0 % (ref 0–5)
LYMPHOCYTES NFR BLD: 1.73 K/UL (ref 1.5–4)
LYMPHOCYTES RELATIVE PERCENT: 33 % (ref 20–42)
MAGNESIUM SERPL-MCNC: 1.5 MG/DL (ref 1.6–2.6)
MCH RBC QN AUTO: 29.3 PG (ref 26–35)
MCHC RBC AUTO-ENTMCNC: 29.5 G/DL (ref 32–34.5)
MCV RBC AUTO: 99.4 FL (ref 80–99.9)
MONOCYTES NFR BLD: 0.65 K/UL (ref 0.1–0.95)
MONOCYTES NFR BLD: 12 % (ref 2–12)
NEUTROPHILS NFR BLD: 48 % (ref 43–80)
NEUTS SEG NFR BLD: 2.51 K/UL (ref 1.8–7.3)
OSMOLALITY UR: 205 MOSM/KG (ref 300–900)
PLATELET # BLD AUTO: 180 K/UL (ref 130–450)
PMV BLD AUTO: 10.2 FL (ref 7–12)
POTASSIUM SERPL-SCNC: 5.2 MMOL/L (ref 3.5–5)
POTASSIUM SERPL-SCNC: 6.1 MMOL/L (ref 3.5–5)
PROT SERPL-MCNC: 5.6 G/DL (ref 6.4–8.3)
RBC # BLD AUTO: 3.48 M/UL (ref 3.5–5.5)
SODIUM SERPL-SCNC: 132 MMOL/L (ref 132–146)
SODIUM SERPL-SCNC: 133 MMOL/L (ref 132–146)
SODIUM UR-SCNC: 53 MMOL/L
TOTAL PROTEIN, URINE: <4 MG/DL (ref 0–12)
TROPONIN I SERPL HS-MCNC: 32 NG/L (ref 0–14)
TROPONIN I SERPL HS-MCNC: 36 NG/L (ref 0–14)
URINE TOTAL PROTEIN CREATININE RATIO: ABNORMAL (ref 0–0.2)
WBC OTHER # BLD: 5.2 K/UL (ref 4.5–11.5)

## 2025-06-05 PROCEDURE — 6360000002 HC RX W HCPCS

## 2025-06-05 PROCEDURE — 96361 HYDRATE IV INFUSION ADD-ON: CPT

## 2025-06-05 PROCEDURE — 99285 EMERGENCY DEPT VISIT HI MDM: CPT

## 2025-06-05 PROCEDURE — 83935 ASSAY OF URINE OSMOLALITY: CPT

## 2025-06-05 PROCEDURE — 84156 ASSAY OF PROTEIN URINE: CPT

## 2025-06-05 PROCEDURE — 80048 BASIC METABOLIC PNL TOTAL CA: CPT

## 2025-06-05 PROCEDURE — 82436 ASSAY OF URINE CHLORIDE: CPT

## 2025-06-05 PROCEDURE — 85025 COMPLETE CBC W/AUTO DIFF WBC: CPT

## 2025-06-05 PROCEDURE — 82570 ASSAY OF URINE CREATININE: CPT

## 2025-06-05 PROCEDURE — 6370000000 HC RX 637 (ALT 250 FOR IP)

## 2025-06-05 PROCEDURE — 83735 ASSAY OF MAGNESIUM: CPT

## 2025-06-05 PROCEDURE — 6370000000 HC RX 637 (ALT 250 FOR IP): Performed by: HOSPITALIST

## 2025-06-05 PROCEDURE — 2580000003 HC RX 258

## 2025-06-05 PROCEDURE — 2500000003 HC RX 250 WO HCPCS: Performed by: HOSPITALIST

## 2025-06-05 PROCEDURE — 93010 ELECTROCARDIOGRAM REPORT: CPT | Performed by: INTERNAL MEDICINE

## 2025-06-05 PROCEDURE — 84484 ASSAY OF TROPONIN QUANT: CPT

## 2025-06-05 PROCEDURE — 80053 COMPREHEN METABOLIC PANEL: CPT

## 2025-06-05 PROCEDURE — 84300 ASSAY OF URINE SODIUM: CPT

## 2025-06-05 PROCEDURE — 2060000000 HC ICU INTERMEDIATE R&B

## 2025-06-05 PROCEDURE — 2500000003 HC RX 250 WO HCPCS

## 2025-06-05 PROCEDURE — 93005 ELECTROCARDIOGRAM TRACING: CPT

## 2025-06-05 PROCEDURE — 96375 TX/PRO/DX INJ NEW DRUG ADDON: CPT

## 2025-06-05 PROCEDURE — 82962 GLUCOSE BLOOD TEST: CPT

## 2025-06-05 PROCEDURE — 96374 THER/PROPH/DIAG INJ IV PUSH: CPT

## 2025-06-05 RX ORDER — POTASSIUM CHLORIDE 7.45 MG/ML
10 INJECTION INTRAVENOUS PRN
Status: DISCONTINUED | OUTPATIENT
Start: 2025-06-05 | End: 2025-06-09 | Stop reason: HOSPADM

## 2025-06-05 RX ORDER — BUPROPION HYDROCHLORIDE 150 MG/1
150 TABLET, EXTENDED RELEASE ORAL EVERY 12 HOURS
Status: DISCONTINUED | OUTPATIENT
Start: 2025-06-05 | End: 2025-06-09 | Stop reason: HOSPADM

## 2025-06-05 RX ORDER — POLYETHYLENE GLYCOL 3350 17 G/17G
17 POWDER, FOR SOLUTION ORAL DAILY PRN
Status: DISCONTINUED | OUTPATIENT
Start: 2025-06-05 | End: 2025-06-09 | Stop reason: HOSPADM

## 2025-06-05 RX ORDER — ACETAMINOPHEN 325 MG/1
650 TABLET ORAL EVERY 6 HOURS PRN
Status: DISCONTINUED | OUTPATIENT
Start: 2025-06-05 | End: 2025-06-09 | Stop reason: HOSPADM

## 2025-06-05 RX ORDER — 0.9 % SODIUM CHLORIDE 0.9 %
500 INTRAVENOUS SOLUTION INTRAVENOUS ONCE
Status: DISCONTINUED | OUTPATIENT
Start: 2025-06-05 | End: 2025-06-05

## 2025-06-05 RX ORDER — DEXTROSE MONOHYDRATE 100 MG/ML
INJECTION, SOLUTION INTRAVENOUS CONTINUOUS PRN
Status: DISCONTINUED | OUTPATIENT
Start: 2025-06-05 | End: 2025-06-09 | Stop reason: HOSPADM

## 2025-06-05 RX ORDER — MAGNESIUM SULFATE IN WATER 40 MG/ML
2000 INJECTION, SOLUTION INTRAVENOUS PRN
Status: DISCONTINUED | OUTPATIENT
Start: 2025-06-05 | End: 2025-06-09 | Stop reason: HOSPADM

## 2025-06-05 RX ORDER — ONDANSETRON 4 MG/1
4 TABLET, ORALLY DISINTEGRATING ORAL EVERY 8 HOURS PRN
Status: DISCONTINUED | OUTPATIENT
Start: 2025-06-05 | End: 2025-06-09 | Stop reason: HOSPADM

## 2025-06-05 RX ORDER — ONDANSETRON 4 MG/1
4 TABLET, FILM COATED ORAL EVERY 8 HOURS PRN
COMMUNITY
Start: 2025-05-20

## 2025-06-05 RX ORDER — POLYVINYL ALCOHOL 14 MG/ML
1 SOLUTION/ DROPS OPHTHALMIC 4 TIMES DAILY
Status: DISCONTINUED | OUTPATIENT
Start: 2025-06-05 | End: 2025-06-09 | Stop reason: HOSPADM

## 2025-06-05 RX ORDER — ATORVASTATIN CALCIUM 40 MG/1
40 TABLET, FILM COATED ORAL NIGHTLY
Status: DISCONTINUED | OUTPATIENT
Start: 2025-06-05 | End: 2025-06-09 | Stop reason: HOSPADM

## 2025-06-05 RX ORDER — ALBUTEROL SULFATE 0.83 MG/ML
10 SOLUTION RESPIRATORY (INHALATION) ONCE
Status: COMPLETED | OUTPATIENT
Start: 2025-06-05 | End: 2025-06-05

## 2025-06-05 RX ORDER — BUPROPION HYDROCHLORIDE 150 MG/1
150 TABLET, EXTENDED RELEASE ORAL 2 TIMES DAILY
COMMUNITY

## 2025-06-05 RX ORDER — DIPHENOXYLATE HYDROCHLORIDE AND ATROPINE SULFATE 2.5; .025 MG/1; MG/1
1 TABLET ORAL 2 TIMES DAILY
COMMUNITY

## 2025-06-05 RX ORDER — METOPROLOL SUCCINATE 25 MG/1
25 TABLET, EXTENDED RELEASE ORAL DAILY
Status: DISCONTINUED | OUTPATIENT
Start: 2025-06-05 | End: 2025-06-09 | Stop reason: HOSPADM

## 2025-06-05 RX ORDER — PANTOPRAZOLE SODIUM 40 MG/1
40 TABLET, DELAYED RELEASE ORAL DAILY
Status: DISCONTINUED | OUTPATIENT
Start: 2025-06-05 | End: 2025-06-09 | Stop reason: HOSPADM

## 2025-06-05 RX ORDER — CALCIUM GLUCONATE 20 MG/ML
1000 INJECTION, SOLUTION INTRAVENOUS ONCE
Status: COMPLETED | OUTPATIENT
Start: 2025-06-05 | End: 2025-06-05

## 2025-06-05 RX ORDER — GLUCAGON 1 MG/ML
1 KIT INJECTION PRN
Status: DISCONTINUED | OUTPATIENT
Start: 2025-06-05 | End: 2025-06-09 | Stop reason: HOSPADM

## 2025-06-05 RX ORDER — DICLOFENAC SODIUM 75 MG/1
75 TABLET, DELAYED RELEASE ORAL 2 TIMES DAILY
Status: ON HOLD | COMMUNITY
Start: 2025-05-02 | End: 2025-06-09 | Stop reason: HOSPADM

## 2025-06-05 RX ORDER — SODIUM CHLORIDE 0.9 % (FLUSH) 0.9 %
5-40 SYRINGE (ML) INJECTION PRN
Status: DISCONTINUED | OUTPATIENT
Start: 2025-06-05 | End: 2025-06-09 | Stop reason: HOSPADM

## 2025-06-05 RX ORDER — POTASSIUM CHLORIDE 1500 MG/1
40 TABLET, EXTENDED RELEASE ORAL PRN
Status: DISCONTINUED | OUTPATIENT
Start: 2025-06-05 | End: 2025-06-09 | Stop reason: HOSPADM

## 2025-06-05 RX ORDER — CETIRIZINE HYDROCHLORIDE 10 MG/1
5 TABLET ORAL DAILY PRN
Status: DISCONTINUED | OUTPATIENT
Start: 2025-06-05 | End: 2025-06-09 | Stop reason: HOSPADM

## 2025-06-05 RX ORDER — 0.9 % SODIUM CHLORIDE 0.9 %
1000 INTRAVENOUS SOLUTION INTRAVENOUS ONCE
Status: DISCONTINUED | OUTPATIENT
Start: 2025-06-05 | End: 2025-06-05

## 2025-06-05 RX ORDER — SODIUM CHLORIDE 0.9 % (FLUSH) 0.9 %
5-40 SYRINGE (ML) INJECTION EVERY 12 HOURS SCHEDULED
Status: DISCONTINUED | OUTPATIENT
Start: 2025-06-05 | End: 2025-06-09 | Stop reason: HOSPADM

## 2025-06-05 RX ORDER — ACETAMINOPHEN 650 MG/1
650 SUPPOSITORY RECTAL EVERY 6 HOURS PRN
Status: DISCONTINUED | OUTPATIENT
Start: 2025-06-05 | End: 2025-06-09 | Stop reason: HOSPADM

## 2025-06-05 RX ORDER — SODIUM CHLORIDE 9 MG/ML
INJECTION, SOLUTION INTRAVENOUS CONTINUOUS
Status: DISCONTINUED | OUTPATIENT
Start: 2025-06-05 | End: 2025-06-06

## 2025-06-05 RX ORDER — SODIUM CHLORIDE 9 MG/ML
INJECTION, SOLUTION INTRAVENOUS PRN
Status: DISCONTINUED | OUTPATIENT
Start: 2025-06-05 | End: 2025-06-09 | Stop reason: HOSPADM

## 2025-06-05 RX ORDER — SUCRALFATE 1 G/1
1 TABLET ORAL EVERY 6 HOURS
Status: DISCONTINUED | OUTPATIENT
Start: 2025-06-05 | End: 2025-06-09 | Stop reason: HOSPADM

## 2025-06-05 RX ORDER — LEVOTHYROXINE SODIUM 75 UG/1
75 TABLET ORAL DAILY
Status: DISCONTINUED | OUTPATIENT
Start: 2025-06-06 | End: 2025-06-09 | Stop reason: HOSPADM

## 2025-06-05 RX ORDER — TOPIRAMATE 25 MG/1
25 TABLET, FILM COATED ORAL NIGHTLY
Status: DISCONTINUED | OUTPATIENT
Start: 2025-06-05 | End: 2025-06-09 | Stop reason: HOSPADM

## 2025-06-05 RX ORDER — MAGNESIUM SULFATE IN WATER 40 MG/ML
2000 INJECTION, SOLUTION INTRAVENOUS ONCE
Status: COMPLETED | OUTPATIENT
Start: 2025-06-05 | End: 2025-06-05

## 2025-06-05 RX ORDER — ONDANSETRON 2 MG/ML
4 INJECTION INTRAMUSCULAR; INTRAVENOUS EVERY 6 HOURS PRN
Status: DISCONTINUED | OUTPATIENT
Start: 2025-06-05 | End: 2025-06-09 | Stop reason: HOSPADM

## 2025-06-05 RX ORDER — DONEPEZIL HYDROCHLORIDE 10 MG/1
10 TABLET, FILM COATED ORAL DAILY
Status: DISCONTINUED | OUTPATIENT
Start: 2025-06-05 | End: 2025-06-09 | Stop reason: HOSPADM

## 2025-06-05 RX ORDER — INDOMETHACIN 25 MG/1
50 CAPSULE ORAL ONCE
Status: COMPLETED | OUTPATIENT
Start: 2025-06-05 | End: 2025-06-05

## 2025-06-05 RX ORDER — FLUTICASONE PROPIONATE 50 MCG
1 SPRAY, SUSPENSION (ML) NASAL DAILY PRN
COMMUNITY

## 2025-06-05 RX ADMIN — SODIUM CHLORIDE 1000 ML: 0.9 INJECTION, SOLUTION INTRAVENOUS at 12:35

## 2025-06-05 RX ADMIN — DONEPEZIL HYDROCHLORIDE 10 MG: 10 TABLET, FILM COATED ORAL at 15:54

## 2025-06-05 RX ADMIN — SODIUM BICARBONATE: 84 INJECTION, SOLUTION INTRAVENOUS at 14:18

## 2025-06-05 RX ADMIN — INSULIN HUMAN 10 UNITS: 100 INJECTION, SOLUTION PARENTERAL at 13:56

## 2025-06-05 RX ADMIN — BUSPIRONE HYDROCHLORIDE 15 MG: 10 TABLET ORAL at 21:03

## 2025-06-05 RX ADMIN — MAGNESIUM SULFATE HEPTAHYDRATE 2000 MG: 40 INJECTION, SOLUTION INTRAVENOUS at 14:10

## 2025-06-05 RX ADMIN — DEXTROSE MONOHYDRATE 250 ML: 100 INJECTION, SOLUTION INTRAVENOUS at 13:58

## 2025-06-05 RX ADMIN — TOPIRAMATE 25 MG: 25 TABLET, FILM COATED ORAL at 21:04

## 2025-06-05 RX ADMIN — ATORVASTATIN CALCIUM 40 MG: 40 TABLET, FILM COATED ORAL at 21:03

## 2025-06-05 RX ADMIN — SODIUM CHLORIDE, PRESERVATIVE FREE 10 ML: 5 INJECTION INTRAVENOUS at 21:04

## 2025-06-05 RX ADMIN — CALCIUM GLUCONATE 1000 MG: 20 INJECTION, SOLUTION INTRAVENOUS at 13:42

## 2025-06-05 RX ADMIN — SUCRALFATE 1 G: 1 TABLET ORAL at 17:19

## 2025-06-05 RX ADMIN — PANTOPRAZOLE SODIUM 40 MG: 40 TABLET, DELAYED RELEASE ORAL at 15:54

## 2025-06-05 RX ADMIN — BUPROPION HYDROCHLORIDE 150 MG: 150 TABLET, FILM COATED, EXTENDED RELEASE ORAL at 21:04

## 2025-06-05 RX ADMIN — POLYVINYL ALCOHOL 1 DROP: 1.4 SOLUTION/ DROPS OPHTHALMIC at 21:04

## 2025-06-05 RX ADMIN — ALBUTEROL SULFATE 10 MG: 2.5 SOLUTION RESPIRATORY (INHALATION) at 13:30

## 2025-06-05 RX ADMIN — SODIUM BICARBONATE 50 MEQ: 84 INJECTION, SOLUTION INTRAVENOUS at 13:45

## 2025-06-05 RX ADMIN — METOPROLOL SUCCINATE 25 MG: 25 TABLET, EXTENDED RELEASE ORAL at 15:54

## 2025-06-05 RX ADMIN — APIXABAN 2.5 MG: 2.5 TABLET, FILM COATED ORAL at 21:03

## 2025-06-05 RX ADMIN — POLYVINYL ALCOHOL 1 DROP: 1.4 SOLUTION/ DROPS OPHTHALMIC at 17:19

## 2025-06-05 ASSESSMENT — PAIN - FUNCTIONAL ASSESSMENT: PAIN_FUNCTIONAL_ASSESSMENT: NONE - DENIES PAIN

## 2025-06-05 NOTE — ED PROVIDER NOTES
[address] INTERNAL MEDICINE 2  EMERGENCY DEPARTMENT ENCOUNTER        Pt Name: Cindy Wells  MRN: [id number]  Birthdate 1940  Date of evaluation: 6/5/2025  Provider: Ulysses Cardenas DO  PCP: Maryan Pink MD  Note Started: 12:45 PM EDT 6/5/25    CHIEF COMPLAINT       Chief Complaint   Patient presents with    Dehydration     Recent UTI       HISTORY OF PRESENT ILLNESS: 1 or more Elements   History From: PATIENT     Limitations to history : None    Cindy Wells is a 85 y.o. female arriving from home with a complaint of feeling fatigued and dehydrated.  Patient has been on antibiotics for the past 3 weeks finishing her last course a couple days ago for a discovered UTI by her PCP.  She has been on cefdinir and Bactrim.  She got some routine lab work collected recently and she was told that her kidney function had worsened and that she should come to the emergency room.  Currently she has no urinary complaints.      Nursing Notes were all reviewed and agreed with or any disagreements were addressed in the HPI.    REVIEW OF SYSTEMS :      Review of Systems    POSITIVE (+): fatigue  NEGATIVE (-): fevers, chills, nausea, vomiting, diarrhea, constipation, shortness of breath, chest pain, abdominal pain      SURGICAL HISTORY     Past Surgical History:   Procedure Laterality Date    CHOLECYSTECTOMY, LAPAROSCOPIC N/A 9/16/2020    LAPAROSCOPIC CHOLECYSTECTOMY performed by John Porter MD at [facility] OR    ERCP N/A 9/15/2020    ERCP STENT INSERTION with BALLOON SWEEPING and PAPILLOTOMY performed by Molly Augustine MD at [facility] ENDOSCOPY    ERCP N/A 10/21/2020    ERCP STENT REMOVAL performed by Molly Augustine MD at [facility] ENDOSCOPY    HYSTERECTOMY (CERVIX STATUS UNKNOWN)      KNEE SURGERY      THYROID LOBECTOMY      TONSILLECTOMY         CURRENTMEDICATIONS       Current Discharge Medication List        CONTINUE these medications which have NOT CHANGED    Details   buPROPion (WELLBUTRIN SR) 150 MG extended release

## 2025-06-05 NOTE — ED NOTES
ED to Inpatient Handoff Report    Notified JOCELINE Pino on 4W that electronic handoff available and patient ready for transport to room 403.    Safety Risks: Risk of falls    Patient in Restraints: no    Constant Observer or Patient : no    Telemetry Monitoring Ordered: Yes          Order to transfer to unit without monitor: NO    Last MEWS: 1 Time completed: 1431    Deterioration Index: 29.66    Vitals:    06/05/25 1129 06/05/25 1431   BP: (!) 134/50 128/65   Pulse: 56 84   Resp: 18 18   Temp: 97.5 °F (36.4 °C) 97.5 °F (36.4 °C)   TempSrc:  Oral   SpO2: 100% 100%       Opportunity for questions and clarification was provided.

## 2025-06-05 NOTE — CARE COORDINATION
Internal Medicine On-call Care Coordination Note     I was called by the ED physician because they recommended admission for this patient and we cover their PCP.  The history as I understand it after discussion with the ED physician is as follows:     Admit for DANIEL  Was on bactrim for UTI  Now with renal failure and hyperkalemia     I placed admission orders.  Including:     Admit for UTI  Monitor sxm     Dr. Godfrey or his coverage will see the patient tomorrow for H&P and review of all orders.     Electronically signed by Gurpreet Arevalo MD on 6/5/2025 at 2:07 PM

## 2025-06-06 LAB
ALBUMIN SERPL-MCNC: 2.9 G/DL (ref 3.5–5.2)
ALP SERPL-CCNC: 69 U/L (ref 35–104)
ALT SERPL-CCNC: <5 U/L (ref 0–32)
ANION GAP SERPL CALCULATED.3IONS-SCNC: 11 MMOL/L (ref 7–16)
ANION GAP SERPL CALCULATED.3IONS-SCNC: 9 MMOL/L (ref 7–16)
AST SERPL-CCNC: 9 U/L (ref 0–31)
BASOPHILS # BLD: 0.06 K/UL (ref 0–0.2)
BASOPHILS NFR BLD: 1 % (ref 0–2)
BILIRUB SERPL-MCNC: <0.2 MG/DL (ref 0–1.2)
BUN SERPL-MCNC: 20 MG/DL (ref 6–23)
BUN SERPL-MCNC: 23 MG/DL (ref 6–23)
CALCIUM SERPL-MCNC: 8.6 MG/DL (ref 8.6–10.2)
CALCIUM SERPL-MCNC: 8.7 MG/DL (ref 8.6–10.2)
CHLORIDE SERPL-SCNC: 104 MMOL/L (ref 98–107)
CHLORIDE SERPL-SCNC: 109 MMOL/L (ref 98–107)
CO2 SERPL-SCNC: 22 MMOL/L (ref 22–29)
CO2 SERPL-SCNC: 24 MMOL/L (ref 22–29)
CREAT SERPL-MCNC: 1.7 MG/DL (ref 0.5–1)
CREAT SERPL-MCNC: 1.8 MG/DL (ref 0.5–1)
EOSINOPHIL # BLD: 0.29 K/UL (ref 0.05–0.5)
EOSINOPHILS RELATIVE PERCENT: 4 % (ref 0–6)
ERYTHROCYTE [DISTWIDTH] IN BLOOD BY AUTOMATED COUNT: 15.5 % (ref 11.5–15)
GFR, ESTIMATED: 26 ML/MIN/1.73M2
GFR, ESTIMATED: 29 ML/MIN/1.73M2
GLUCOSE SERPL-MCNC: 104 MG/DL (ref 74–99)
GLUCOSE SERPL-MCNC: 127 MG/DL (ref 74–99)
HCT VFR BLD AUTO: 28.4 % (ref 34–48)
HGB BLD-MCNC: 9.1 G/DL (ref 11.5–15.5)
IMM GRANULOCYTES # BLD AUTO: <0.03 K/UL (ref 0–0.58)
IMM GRANULOCYTES NFR BLD: 0 % (ref 0–5)
LACTATE BLDV-SCNC: 1.3 MMOL/L (ref 0.5–2.2)
LYMPHOCYTES NFR BLD: 2.64 K/UL (ref 1.5–4)
LYMPHOCYTES RELATIVE PERCENT: 38 % (ref 20–42)
MAGNESIUM SERPL-MCNC: 1.9 MG/DL (ref 1.6–2.6)
MCH RBC QN AUTO: 29.4 PG (ref 26–35)
MCHC RBC AUTO-ENTMCNC: 32 G/DL (ref 32–34.5)
MCV RBC AUTO: 91.6 FL (ref 80–99.9)
MONOCYTES NFR BLD: 0.82 K/UL (ref 0.1–0.95)
MONOCYTES NFR BLD: 12 % (ref 2–12)
NEUTROPHILS NFR BLD: 44 % (ref 43–80)
NEUTS SEG NFR BLD: 3.04 K/UL (ref 1.8–7.3)
PHOSPHATE SERPL-MCNC: 3.2 MG/DL (ref 2.5–4.5)
PLATELET # BLD AUTO: 166 K/UL (ref 130–450)
PMV BLD AUTO: 10.2 FL (ref 7–12)
POTASSIUM SERPL-SCNC: 4.6 MMOL/L (ref 3.5–5)
POTASSIUM SERPL-SCNC: 4.9 MMOL/L (ref 3.5–5)
PROT SERPL-MCNC: 4.6 G/DL (ref 6.4–8.3)
RBC # BLD AUTO: 3.1 M/UL (ref 3.5–5.5)
SODIUM SERPL-SCNC: 137 MMOL/L (ref 132–146)
SODIUM SERPL-SCNC: 142 MMOL/L (ref 132–146)
WBC OTHER # BLD: 6.9 K/UL (ref 4.5–11.5)

## 2025-06-06 PROCEDURE — 85025 COMPLETE CBC W/AUTO DIFF WBC: CPT

## 2025-06-06 PROCEDURE — 84100 ASSAY OF PHOSPHORUS: CPT

## 2025-06-06 PROCEDURE — 2580000003 HC RX 258: Performed by: HOSPITALIST

## 2025-06-06 PROCEDURE — 97165 OT EVAL LOW COMPLEX 30 MIN: CPT

## 2025-06-06 PROCEDURE — 83605 ASSAY OF LACTIC ACID: CPT

## 2025-06-06 PROCEDURE — 2060000000 HC ICU INTERMEDIATE R&B

## 2025-06-06 PROCEDURE — 97161 PT EVAL LOW COMPLEX 20 MIN: CPT

## 2025-06-06 PROCEDURE — 83735 ASSAY OF MAGNESIUM: CPT

## 2025-06-06 PROCEDURE — 80053 COMPREHEN METABOLIC PANEL: CPT

## 2025-06-06 PROCEDURE — 80048 BASIC METABOLIC PNL TOTAL CA: CPT

## 2025-06-06 PROCEDURE — 2580000003 HC RX 258: Performed by: INTERNAL MEDICINE

## 2025-06-06 PROCEDURE — 6370000000 HC RX 637 (ALT 250 FOR IP): Performed by: INTERNAL MEDICINE

## 2025-06-06 PROCEDURE — 6370000000 HC RX 637 (ALT 250 FOR IP): Performed by: HOSPITALIST

## 2025-06-06 PROCEDURE — 2500000003 HC RX 250 WO HCPCS: Performed by: HOSPITALIST

## 2025-06-06 RX ORDER — LOPERAMIDE HYDROCHLORIDE 2 MG/1
2 CAPSULE ORAL 4 TIMES DAILY PRN
Status: DISCONTINUED | OUTPATIENT
Start: 2025-06-06 | End: 2025-06-09 | Stop reason: HOSPADM

## 2025-06-06 RX ORDER — SODIUM CHLORIDE, SODIUM LACTATE, POTASSIUM CHLORIDE, CALCIUM CHLORIDE 600; 310; 30; 20 MG/100ML; MG/100ML; MG/100ML; MG/100ML
INJECTION, SOLUTION INTRAVENOUS CONTINUOUS
Status: DISCONTINUED | OUTPATIENT
Start: 2025-06-06 | End: 2025-06-09 | Stop reason: HOSPADM

## 2025-06-06 RX ADMIN — SUCRALFATE 1 G: 1 TABLET ORAL at 18:01

## 2025-06-06 RX ADMIN — POLYVINYL ALCOHOL 1 DROP: 1.4 SOLUTION/ DROPS OPHTHALMIC at 18:01

## 2025-06-06 RX ADMIN — LOPERAMIDE HYDROCHLORIDE 2 MG: 2 CAPSULE ORAL at 14:45

## 2025-06-06 RX ADMIN — ATORVASTATIN CALCIUM 40 MG: 40 TABLET, FILM COATED ORAL at 21:26

## 2025-06-06 RX ADMIN — BUPROPION HYDROCHLORIDE 150 MG: 150 TABLET, FILM COATED, EXTENDED RELEASE ORAL at 21:25

## 2025-06-06 RX ADMIN — SODIUM CHLORIDE, SODIUM LACTATE, POTASSIUM CHLORIDE, AND CALCIUM CHLORIDE: .6; .31; .03; .02 INJECTION, SOLUTION INTRAVENOUS at 14:47

## 2025-06-06 RX ADMIN — SUCRALFATE 1 G: 1 TABLET ORAL at 05:21

## 2025-06-06 RX ADMIN — SODIUM BICARBONATE: 84 INJECTION, SOLUTION INTRAVENOUS at 05:21

## 2025-06-06 RX ADMIN — ACETAMINOPHEN 650 MG: 325 TABLET ORAL at 07:58

## 2025-06-06 RX ADMIN — POLYVINYL ALCOHOL 1 DROP: 1.4 SOLUTION/ DROPS OPHTHALMIC at 21:29

## 2025-06-06 RX ADMIN — BUSPIRONE HYDROCHLORIDE 15 MG: 10 TABLET ORAL at 08:41

## 2025-06-06 RX ADMIN — BUSPIRONE HYDROCHLORIDE 15 MG: 10 TABLET ORAL at 21:25

## 2025-06-06 RX ADMIN — APIXABAN 2.5 MG: 2.5 TABLET, FILM COATED ORAL at 21:26

## 2025-06-06 RX ADMIN — POLYVINYL ALCOHOL 1 DROP: 1.4 SOLUTION/ DROPS OPHTHALMIC at 08:42

## 2025-06-06 RX ADMIN — BUPROPION HYDROCHLORIDE 150 MG: 150 TABLET, FILM COATED, EXTENDED RELEASE ORAL at 12:02

## 2025-06-06 RX ADMIN — DONEPEZIL HYDROCHLORIDE 10 MG: 10 TABLET, FILM COATED ORAL at 05:21

## 2025-06-06 RX ADMIN — TOPIRAMATE 25 MG: 25 TABLET, FILM COATED ORAL at 21:25

## 2025-06-06 RX ADMIN — METOPROLOL SUCCINATE 25 MG: 25 TABLET, EXTENDED RELEASE ORAL at 08:42

## 2025-06-06 RX ADMIN — PANTOPRAZOLE SODIUM 40 MG: 40 TABLET, DELAYED RELEASE ORAL at 08:41

## 2025-06-06 RX ADMIN — SUCRALFATE 1 G: 1 TABLET ORAL at 12:02

## 2025-06-06 RX ADMIN — APIXABAN 2.5 MG: 2.5 TABLET, FILM COATED ORAL at 08:41

## 2025-06-06 RX ADMIN — LEVOTHYROXINE SODIUM 75 MCG: 0.07 TABLET ORAL at 05:21

## 2025-06-06 ASSESSMENT — PAIN DESCRIPTION - LOCATION: LOCATION: HEAD

## 2025-06-06 ASSESSMENT — PAIN DESCRIPTION - DESCRIPTORS: DESCRIPTORS: ACHING

## 2025-06-06 ASSESSMENT — PAIN SCALES - GENERAL: PAINLEVEL_OUTOF10: 5

## 2025-06-06 NOTE — ACP (ADVANCE CARE PLANNING)
6/6/2025  Advance Care Planning   Healthcare Decision Maker:    Primary Decision Maker: Prisca Olson [facility] - [phone]    Click here to complete Healthcare Decision Makers including selection of the Healthcare Decision Maker Relationship (ie \"Primary\").

## 2025-06-06 NOTE — CONSULTS
Microalbumen/Creatinine ratio:  No components found for: \"RUCREAT\"  Iron Saturation:  No components found for: \"PERCENTFE\"  TIBC:    Lab Results   Component Value Date/Time    TIBC 198 05/09/2024 02:00 PM     FERRITIN:    Lab Results   Component Value Date/Time    FERRITIN 115 05/09/2024 02:00 PM        Imaging:  No orders to display       Assessment  85-year-old woman admitted with fatigue malaise anorexia and poor intake, mild persistent nausea, recent history of diarrhea though that seems to have resolved.  Baseline creatinine 1.3 to 1.5 mg/dL.  Creatinine presentation 2.2, improving to 1.8 as of this morning.  Status post 2 L normal saline bolus, and NS at 100 cc/h    Acute kidney injury, secondary to prerenal azotemia improved with IV fluid resuscitation  Chronic kidney disease stage IIIb, baseline creatinine 1.3 to 1.5 mg/dL  Anemia, likely at least in part secondary to CKD, the need to rule out occult GI bleed she is normocytic.    Plan  Continue IV fluid resuscitation  Recheck BMP this afternoon, adjust IV fluid content and rate as warranted  Encourage oral intake as able  Check iron profile and ferritin in the morning, supplement as warranted  For completeness, check renal ultrasound      Thank you for the opportunity to participate in the care of your pleasant patient.  We look forward to following along with you.        Electronically signed by Rafiq Staples MD on 6/6/2025 at 2:26 PM    NOTE: This report was transcribed using voice recognition software. Every effort was made to ensure accuracy; however, inadvertent transcription errors may be present.

## 2025-06-06 NOTE — H&P
additions or deletions made.  Agree with assessment.  Patient likely with DANIEL secondary to dehydration with some possible component of Bactrim induced renal insufficiency.  Improving with hydration; discussed with PCP.      Shane Gao DO 6/6/2025 1:38 PM

## 2025-06-06 NOTE — CARE COORDINATION
6/6/2025 Social Work Discharge Planning: DANIEL/dehydration.Nephrology is following.This worker met with Pt to discuss  role and transition of care/discharge planning.Pt is from home and resides with her daughter in a two story home. Pts bed is on the second floor. Pt has a cane and shower chair. Pt is on room air. Awaiting therapy evals. Pt has an aide who comes to bath her but she's not sure who the provider is. Pharmacy is [facility] in [place] and PCP is Dr.A. Pink. Electronically signed by ALLAN Riggs on 6/6/2025 at 11:28 AM    6/6/2025  Social Work Discharge Planning:[facility] 17/24. Pt states she wants to return home and not go to a [facility]. SW gave Pt [facility] choices. Electronically signed by ALLAN Riggs on 6/6/2025 at 3:07 PM

## 2025-06-07 LAB
ALBUMIN SERPL-MCNC: 2.9 G/DL (ref 3.5–5.2)
ALP SERPL-CCNC: 66 U/L (ref 35–104)
ALT SERPL-CCNC: 6 U/L (ref 0–32)
ANION GAP SERPL CALCULATED.3IONS-SCNC: 10 MMOL/L (ref 7–16)
AST SERPL-CCNC: 12 U/L (ref 0–31)
BASOPHILS # BLD: 0.06 K/UL (ref 0–0.2)
BASOPHILS NFR BLD: 1 % (ref 0–2)
BILIRUB SERPL-MCNC: <0.2 MG/DL (ref 0–1.2)
BUN SERPL-MCNC: 17 MG/DL (ref 6–23)
CALCIUM SERPL-MCNC: 8.7 MG/DL (ref 8.6–10.2)
CHLORIDE SERPL-SCNC: 107 MMOL/L (ref 98–107)
CO2 SERPL-SCNC: 23 MMOL/L (ref 22–29)
CREAT SERPL-MCNC: 1.5 MG/DL (ref 0.5–1)
EOSINOPHIL # BLD: 0.36 K/UL (ref 0.05–0.5)
EOSINOPHILS RELATIVE PERCENT: 6 % (ref 0–6)
ERYTHROCYTE [DISTWIDTH] IN BLOOD BY AUTOMATED COUNT: 15.2 % (ref 11.5–15)
FERRITIN SERPL-MCNC: 33 NG/ML
GFR, ESTIMATED: 33 ML/MIN/1.73M2
GLUCOSE SERPL-MCNC: 91 MG/DL (ref 74–99)
HCT VFR BLD AUTO: 28.4 % (ref 34–48)
HGB BLD-MCNC: 9 G/DL (ref 11.5–15.5)
IMM GRANULOCYTES # BLD AUTO: <0.03 K/UL (ref 0–0.58)
IMM GRANULOCYTES NFR BLD: 0 % (ref 0–5)
IRON SATN MFR SERPL: 10 % (ref 15–50)
IRON SERPL-MCNC: 20 UG/DL (ref 37–145)
LYMPHOCYTES NFR BLD: 2.62 K/UL (ref 1.5–4)
LYMPHOCYTES RELATIVE PERCENT: 41 % (ref 20–42)
MAGNESIUM SERPL-MCNC: 1.5 MG/DL (ref 1.6–2.6)
MCH RBC QN AUTO: 29.7 PG (ref 26–35)
MCHC RBC AUTO-ENTMCNC: 31.7 G/DL (ref 32–34.5)
MCV RBC AUTO: 93.7 FL (ref 80–99.9)
MONOCYTES NFR BLD: 0.7 K/UL (ref 0.1–0.95)
MONOCYTES NFR BLD: 11 % (ref 2–12)
NEUTROPHILS NFR BLD: 42 % (ref 43–80)
NEUTS SEG NFR BLD: 2.66 K/UL (ref 1.8–7.3)
PHOSPHATE SERPL-MCNC: 2.6 MG/DL (ref 2.5–4.5)
PLATELET # BLD AUTO: 165 K/UL (ref 130–450)
PMV BLD AUTO: 10.2 FL (ref 7–12)
POTASSIUM SERPL-SCNC: 4.3 MMOL/L (ref 3.5–5)
PROT SERPL-MCNC: 4.5 G/DL (ref 6.4–8.3)
RBC # BLD AUTO: 3.03 M/UL (ref 3.5–5.5)
SODIUM SERPL-SCNC: 140 MMOL/L (ref 132–146)
TIBC SERPL-MCNC: 208 UG/DL (ref 250–450)
WBC OTHER # BLD: 6.4 K/UL (ref 4.5–11.5)

## 2025-06-07 PROCEDURE — 2500000003 HC RX 250 WO HCPCS: Performed by: HOSPITALIST

## 2025-06-07 PROCEDURE — 6370000000 HC RX 637 (ALT 250 FOR IP): Performed by: HOSPITALIST

## 2025-06-07 PROCEDURE — 83540 ASSAY OF IRON: CPT

## 2025-06-07 PROCEDURE — 2580000003 HC RX 258: Performed by: INTERNAL MEDICINE

## 2025-06-07 PROCEDURE — 80053 COMPREHEN METABOLIC PANEL: CPT

## 2025-06-07 PROCEDURE — 83735 ASSAY OF MAGNESIUM: CPT

## 2025-06-07 PROCEDURE — 85025 COMPLETE CBC W/AUTO DIFF WBC: CPT

## 2025-06-07 PROCEDURE — 2060000000 HC ICU INTERMEDIATE R&B

## 2025-06-07 PROCEDURE — 6360000002 HC RX W HCPCS: Performed by: INTERNAL MEDICINE

## 2025-06-07 PROCEDURE — 82728 ASSAY OF FERRITIN: CPT

## 2025-06-07 PROCEDURE — 84100 ASSAY OF PHOSPHORUS: CPT

## 2025-06-07 PROCEDURE — 6370000000 HC RX 637 (ALT 250 FOR IP): Performed by: INTERNAL MEDICINE

## 2025-06-07 PROCEDURE — 83550 IRON BINDING TEST: CPT

## 2025-06-07 RX ADMIN — POLYVINYL ALCOHOL 1 DROP: 1.4 SOLUTION/ DROPS OPHTHALMIC at 11:46

## 2025-06-07 RX ADMIN — DONEPEZIL HYDROCHLORIDE 10 MG: 10 TABLET, FILM COATED ORAL at 05:55

## 2025-06-07 RX ADMIN — SUCRALFATE 1 G: 1 TABLET ORAL at 00:37

## 2025-06-07 RX ADMIN — ATORVASTATIN CALCIUM 40 MG: 40 TABLET, FILM COATED ORAL at 20:34

## 2025-06-07 RX ADMIN — PANTOPRAZOLE SODIUM 40 MG: 40 TABLET, DELAYED RELEASE ORAL at 07:31

## 2025-06-07 RX ADMIN — SODIUM CHLORIDE, PRESERVATIVE FREE 10 ML: 5 INJECTION INTRAVENOUS at 07:31

## 2025-06-07 RX ADMIN — BUPROPION HYDROCHLORIDE 150 MG: 150 TABLET, FILM COATED, EXTENDED RELEASE ORAL at 07:30

## 2025-06-07 RX ADMIN — SUCRALFATE 1 G: 1 TABLET ORAL at 17:14

## 2025-06-07 RX ADMIN — METOPROLOL SUCCINATE 25 MG: 25 TABLET, EXTENDED RELEASE ORAL at 07:30

## 2025-06-07 RX ADMIN — POLYVINYL ALCOHOL 1 DROP: 1.4 SOLUTION/ DROPS OPHTHALMIC at 17:14

## 2025-06-07 RX ADMIN — BUSPIRONE HYDROCHLORIDE 15 MG: 10 TABLET ORAL at 07:30

## 2025-06-07 RX ADMIN — SUCRALFATE 1 G: 1 TABLET ORAL at 05:55

## 2025-06-07 RX ADMIN — POLYVINYL ALCOHOL 1 DROP: 1.4 SOLUTION/ DROPS OPHTHALMIC at 07:32

## 2025-06-07 RX ADMIN — ACETAMINOPHEN 650 MG: 325 TABLET ORAL at 05:55

## 2025-06-07 RX ADMIN — SODIUM CHLORIDE 125 MG: 9 INJECTION, SOLUTION INTRAVENOUS at 13:40

## 2025-06-07 RX ADMIN — POLYVINYL ALCOHOL 1 DROP: 1.4 SOLUTION/ DROPS OPHTHALMIC at 20:49

## 2025-06-07 RX ADMIN — TOPIRAMATE 25 MG: 25 TABLET, FILM COATED ORAL at 20:34

## 2025-06-07 RX ADMIN — SUCRALFATE 1 G: 1 TABLET ORAL at 11:46

## 2025-06-07 RX ADMIN — APIXABAN 2.5 MG: 2.5 TABLET, FILM COATED ORAL at 07:30

## 2025-06-07 RX ADMIN — APIXABAN 2.5 MG: 2.5 TABLET, FILM COATED ORAL at 20:34

## 2025-06-07 RX ADMIN — SODIUM CHLORIDE, SODIUM LACTATE, POTASSIUM CHLORIDE, AND CALCIUM CHLORIDE: .6; .31; .03; .02 INJECTION, SOLUTION INTRAVENOUS at 01:06

## 2025-06-07 RX ADMIN — LOPERAMIDE HYDROCHLORIDE 2 MG: 2 CAPSULE ORAL at 07:38

## 2025-06-07 RX ADMIN — BUPROPION HYDROCHLORIDE 150 MG: 150 TABLET, FILM COATED, EXTENDED RELEASE ORAL at 20:36

## 2025-06-07 RX ADMIN — LEVOTHYROXINE SODIUM 75 MCG: 0.07 TABLET ORAL at 05:55

## 2025-06-07 RX ADMIN — BUSPIRONE HYDROCHLORIDE 15 MG: 10 TABLET ORAL at 20:34

## 2025-06-07 ASSESSMENT — PAIN DESCRIPTION - LOCATION: LOCATION: BACK

## 2025-06-07 ASSESSMENT — PAIN SCALES - GENERAL: PAINLEVEL_OUTOF10: 8

## 2025-06-07 ASSESSMENT — PAIN DESCRIPTION - DESCRIPTORS: DESCRIPTORS: PATIENT UNABLE TO DESCRIBE

## 2025-06-07 ASSESSMENT — PAIN DESCRIPTION - ORIENTATION: ORIENTATION: UPPER

## 2025-06-08 LAB
ALBUMIN SERPL-MCNC: 2.9 G/DL (ref 3.5–5.2)
ALP SERPL-CCNC: 71 U/L (ref 35–104)
ALT SERPL-CCNC: 7 U/L (ref 0–32)
ANION GAP SERPL CALCULATED.3IONS-SCNC: 11 MMOL/L (ref 7–16)
AST SERPL-CCNC: 13 U/L (ref 0–31)
BASOPHILS # BLD: 0.08 K/UL (ref 0–0.2)
BASOPHILS NFR BLD: 1 % (ref 0–2)
BILIRUB SERPL-MCNC: 0.2 MG/DL (ref 0–1.2)
BUN SERPL-MCNC: 14 MG/DL (ref 6–23)
CALCIUM SERPL-MCNC: 8.7 MG/DL (ref 8.6–10.2)
CHLORIDE SERPL-SCNC: 109 MMOL/L (ref 98–107)
CO2 SERPL-SCNC: 23 MMOL/L (ref 22–29)
CREAT SERPL-MCNC: 1.4 MG/DL (ref 0.5–1)
EOSINOPHIL # BLD: 0.33 K/UL (ref 0.05–0.5)
EOSINOPHILS RELATIVE PERCENT: 5 % (ref 0–6)
ERYTHROCYTE [DISTWIDTH] IN BLOOD BY AUTOMATED COUNT: 15 % (ref 11.5–15)
GFR, ESTIMATED: 36 ML/MIN/1.73M2
GLUCOSE SERPL-MCNC: 92 MG/DL (ref 74–99)
HCT VFR BLD AUTO: 30.3 % (ref 34–48)
HGB BLD-MCNC: 9.1 G/DL (ref 11.5–15.5)
IMM GRANULOCYTES # BLD AUTO: <0.03 K/UL (ref 0–0.58)
IMM GRANULOCYTES NFR BLD: 0 % (ref 0–5)
LYMPHOCYTES NFR BLD: 2.41 K/UL (ref 1.5–4)
LYMPHOCYTES RELATIVE PERCENT: 37 % (ref 20–42)
MCH RBC QN AUTO: 28.9 PG (ref 26–35)
MCHC RBC AUTO-ENTMCNC: 30 G/DL (ref 32–34.5)
MCV RBC AUTO: 96.2 FL (ref 80–99.9)
MONOCYTES NFR BLD: 0.68 K/UL (ref 0.1–0.95)
MONOCYTES NFR BLD: 11 % (ref 2–12)
NEUTROPHILS NFR BLD: 45 % (ref 43–80)
NEUTS SEG NFR BLD: 2.92 K/UL (ref 1.8–7.3)
PLATELET # BLD AUTO: 161 K/UL (ref 130–450)
PMV BLD AUTO: 9.9 FL (ref 7–12)
POTASSIUM SERPL-SCNC: 4.1 MMOL/L (ref 3.5–5)
PROT SERPL-MCNC: 4.8 G/DL (ref 6.4–8.3)
RBC # BLD AUTO: 3.15 M/UL (ref 3.5–5.5)
SODIUM SERPL-SCNC: 143 MMOL/L (ref 132–146)
WBC OTHER # BLD: 6.4 K/UL (ref 4.5–11.5)

## 2025-06-08 PROCEDURE — 2580000003 HC RX 258: Performed by: INTERNAL MEDICINE

## 2025-06-08 PROCEDURE — 6370000000 HC RX 637 (ALT 250 FOR IP): Performed by: HOSPITALIST

## 2025-06-08 PROCEDURE — 85025 COMPLETE CBC W/AUTO DIFF WBC: CPT

## 2025-06-08 PROCEDURE — 6370000000 HC RX 637 (ALT 250 FOR IP): Performed by: INTERNAL MEDICINE

## 2025-06-08 PROCEDURE — 80053 COMPREHEN METABOLIC PANEL: CPT

## 2025-06-08 PROCEDURE — 2500000003 HC RX 250 WO HCPCS: Performed by: HOSPITALIST

## 2025-06-08 PROCEDURE — 6360000002 HC RX W HCPCS: Performed by: INTERNAL MEDICINE

## 2025-06-08 PROCEDURE — 2060000000 HC ICU INTERMEDIATE R&B

## 2025-06-08 PROCEDURE — 6360000002 HC RX W HCPCS: Performed by: HOSPITALIST

## 2025-06-08 RX ORDER — COLESTIPOL HYDROCHLORIDE 1 G/1
TABLET ORAL 2 TIMES DAILY
Status: DISCONTINUED | OUTPATIENT
Start: 2025-06-08 | End: 2025-06-09 | Stop reason: HOSPADM

## 2025-06-08 RX ADMIN — SUCRALFATE 1 G: 1 TABLET ORAL at 16:38

## 2025-06-08 RX ADMIN — BUPROPION HYDROCHLORIDE 150 MG: 150 TABLET, FILM COATED, EXTENDED RELEASE ORAL at 07:42

## 2025-06-08 RX ADMIN — POLYVINYL ALCOHOL 1 DROP: 1.4 SOLUTION/ DROPS OPHTHALMIC at 11:25

## 2025-06-08 RX ADMIN — METOPROLOL SUCCINATE 25 MG: 25 TABLET, EXTENDED RELEASE ORAL at 07:42

## 2025-06-08 RX ADMIN — SUCRALFATE 1 G: 1 TABLET ORAL at 05:56

## 2025-06-08 RX ADMIN — SUCRALFATE 1 G: 1 TABLET ORAL at 01:59

## 2025-06-08 RX ADMIN — POLYVINYL ALCOHOL 1 DROP: 1.4 SOLUTION/ DROPS OPHTHALMIC at 07:43

## 2025-06-08 RX ADMIN — APIXABAN 2.5 MG: 2.5 TABLET, FILM COATED ORAL at 07:42

## 2025-06-08 RX ADMIN — BUSPIRONE HYDROCHLORIDE 15 MG: 10 TABLET ORAL at 20:02

## 2025-06-08 RX ADMIN — SODIUM CHLORIDE, PRESERVATIVE FREE 10 ML: 5 INJECTION INTRAVENOUS at 07:42

## 2025-06-08 RX ADMIN — APIXABAN 2.5 MG: 2.5 TABLET, FILM COATED ORAL at 20:02

## 2025-06-08 RX ADMIN — DONEPEZIL HYDROCHLORIDE 10 MG: 10 TABLET, FILM COATED ORAL at 05:56

## 2025-06-08 RX ADMIN — SUCRALFATE 1 G: 1 TABLET ORAL at 23:23

## 2025-06-08 RX ADMIN — ONDANSETRON 4 MG: 2 INJECTION, SOLUTION INTRAMUSCULAR; INTRAVENOUS at 07:51

## 2025-06-08 RX ADMIN — SODIUM CHLORIDE 125 MG: 9 INJECTION, SOLUTION INTRAVENOUS at 07:49

## 2025-06-08 RX ADMIN — ATORVASTATIN CALCIUM 40 MG: 40 TABLET, FILM COATED ORAL at 20:02

## 2025-06-08 RX ADMIN — SODIUM CHLORIDE, SODIUM LACTATE, POTASSIUM CHLORIDE, AND CALCIUM CHLORIDE: .6; .31; .03; .02 INJECTION, SOLUTION INTRAVENOUS at 02:02

## 2025-06-08 RX ADMIN — POLYVINYL ALCOHOL 1 DROP: 1.4 SOLUTION/ DROPS OPHTHALMIC at 20:01

## 2025-06-08 RX ADMIN — BUSPIRONE HYDROCHLORIDE 15 MG: 10 TABLET ORAL at 07:42

## 2025-06-08 RX ADMIN — PANTOPRAZOLE SODIUM 40 MG: 40 TABLET, DELAYED RELEASE ORAL at 07:42

## 2025-06-08 RX ADMIN — SODIUM CHLORIDE, SODIUM LACTATE, POTASSIUM CHLORIDE, AND CALCIUM CHLORIDE: .6; .31; .03; .02 INJECTION, SOLUTION INTRAVENOUS at 21:34

## 2025-06-08 RX ADMIN — SUCRALFATE 1 G: 1 TABLET ORAL at 11:25

## 2025-06-08 RX ADMIN — ACETAMINOPHEN 650 MG: 325 TABLET ORAL at 04:25

## 2025-06-08 RX ADMIN — LEVOTHYROXINE SODIUM 75 MCG: 0.07 TABLET ORAL at 05:56

## 2025-06-08 RX ADMIN — COLESTIPOL HYDROCHLORIDE 2 G: 1 TABLET ORAL at 20:01

## 2025-06-08 RX ADMIN — LOPERAMIDE HYDROCHLORIDE 2 MG: 2 CAPSULE ORAL at 05:56

## 2025-06-08 RX ADMIN — TOPIRAMATE 25 MG: 25 TABLET, FILM COATED ORAL at 20:02

## 2025-06-08 RX ADMIN — BUPROPION HYDROCHLORIDE 150 MG: 150 TABLET, FILM COATED, EXTENDED RELEASE ORAL at 20:02

## 2025-06-08 RX ADMIN — LOPERAMIDE HYDROCHLORIDE 2 MG: 2 CAPSULE ORAL at 16:38

## 2025-06-08 RX ADMIN — POLYVINYL ALCOHOL 1 DROP: 1.4 SOLUTION/ DROPS OPHTHALMIC at 16:38

## 2025-06-08 ASSESSMENT — PAIN DESCRIPTION - LOCATION: LOCATION: BACK

## 2025-06-08 ASSESSMENT — PAIN DESCRIPTION - DESCRIPTORS: DESCRIPTORS: THROBBING

## 2025-06-08 ASSESSMENT — PAIN SCALES - GENERAL: PAINLEVEL_OUTOF10: 8

## 2025-06-08 ASSESSMENT — PAIN DESCRIPTION - ORIENTATION: ORIENTATION: UPPER

## 2025-06-08 ASSESSMENT — PAIN - FUNCTIONAL ASSESSMENT: PAIN_FUNCTIONAL_ASSESSMENT: ACTIVITIES ARE NOT PREVENTED

## 2025-06-09 VITALS
HEIGHT: 64 IN | TEMPERATURE: 97.7 F | WEIGHT: 157.19 LBS | DIASTOLIC BLOOD PRESSURE: 75 MMHG | BODY MASS INDEX: 26.84 KG/M2 | OXYGEN SATURATION: 92 % | HEART RATE: 76 BPM | RESPIRATION RATE: 18 BRPM | SYSTOLIC BLOOD PRESSURE: 155 MMHG

## 2025-06-09 LAB
ALBUMIN SERPL-MCNC: 3 G/DL (ref 3.5–5.2)
ALP SERPL-CCNC: 75 U/L (ref 35–104)
ALT SERPL-CCNC: 6 U/L (ref 0–32)
ANION GAP SERPL CALCULATED.3IONS-SCNC: 13 MMOL/L (ref 7–16)
AST SERPL-CCNC: 14 U/L (ref 0–31)
BASOPHILS # BLD: 0.05 K/UL (ref 0–0.2)
BASOPHILS NFR BLD: 1 % (ref 0–2)
BILIRUB SERPL-MCNC: 0.4 MG/DL (ref 0–1.2)
BUN SERPL-MCNC: 11 MG/DL (ref 6–23)
CALCIUM SERPL-MCNC: 9 MG/DL (ref 8.6–10.2)
CHLORIDE SERPL-SCNC: 106 MMOL/L (ref 98–107)
CO2 SERPL-SCNC: 24 MMOL/L (ref 22–29)
CREAT SERPL-MCNC: 1.3 MG/DL (ref 0.5–1)
EOSINOPHIL # BLD: 0.31 K/UL (ref 0.05–0.5)
EOSINOPHILS RELATIVE PERCENT: 5 % (ref 0–6)
ERYTHROCYTE [DISTWIDTH] IN BLOOD BY AUTOMATED COUNT: 14.9 % (ref 11.5–15)
GFR, ESTIMATED: 39 ML/MIN/1.73M2
GLUCOSE SERPL-MCNC: 92 MG/DL (ref 74–99)
HCT VFR BLD AUTO: 33.6 % (ref 34–48)
HGB BLD-MCNC: 10 G/DL (ref 11.5–15.5)
IMM GRANULOCYTES # BLD AUTO: 0.04 K/UL (ref 0–0.58)
IMM GRANULOCYTES NFR BLD: 1 % (ref 0–5)
LYMPHOCYTES NFR BLD: 2.09 K/UL (ref 1.5–4)
LYMPHOCYTES RELATIVE PERCENT: 30 % (ref 20–42)
MCH RBC QN AUTO: 28.9 PG (ref 26–35)
MCHC RBC AUTO-ENTMCNC: 29.8 G/DL (ref 32–34.5)
MCV RBC AUTO: 97.1 FL (ref 80–99.9)
MONOCYTES NFR BLD: 0.75 K/UL (ref 0.1–0.95)
MONOCYTES NFR BLD: 11 % (ref 2–12)
NEUTROPHILS NFR BLD: 53 % (ref 43–80)
NEUTS SEG NFR BLD: 3.68 K/UL (ref 1.8–7.3)
PLATELET # BLD AUTO: 183 K/UL (ref 130–450)
PMV BLD AUTO: 10.2 FL (ref 7–12)
POTASSIUM SERPL-SCNC: 3.8 MMOL/L (ref 3.5–5)
PROT SERPL-MCNC: 5.1 G/DL (ref 6.4–8.3)
RBC # BLD AUTO: 3.46 M/UL (ref 3.5–5.5)
SODIUM SERPL-SCNC: 143 MMOL/L (ref 132–146)
WBC OTHER # BLD: 6.9 K/UL (ref 4.5–11.5)

## 2025-06-09 PROCEDURE — 6360000002 HC RX W HCPCS: Performed by: INTERNAL MEDICINE

## 2025-06-09 PROCEDURE — 80053 COMPREHEN METABOLIC PANEL: CPT

## 2025-06-09 PROCEDURE — 85025 COMPLETE CBC W/AUTO DIFF WBC: CPT

## 2025-06-09 PROCEDURE — 97535 SELF CARE MNGMENT TRAINING: CPT

## 2025-06-09 PROCEDURE — 2580000003 HC RX 258: Performed by: INTERNAL MEDICINE

## 2025-06-09 PROCEDURE — 36415 COLL VENOUS BLD VENIPUNCTURE: CPT

## 2025-06-09 PROCEDURE — 6370000000 HC RX 637 (ALT 250 FOR IP): Performed by: HOSPITALIST

## 2025-06-09 PROCEDURE — 97530 THERAPEUTIC ACTIVITIES: CPT

## 2025-06-09 RX ORDER — COLESTIPOL HYDROCHLORIDE 1 G/1
1 TABLET ORAL 2 TIMES DAILY
Qty: 60 TABLET | Refills: 0 | Status: SHIPPED | OUTPATIENT
Start: 2025-06-09

## 2025-06-09 RX ADMIN — APIXABAN 2.5 MG: 2.5 TABLET, FILM COATED ORAL at 08:39

## 2025-06-09 RX ADMIN — SODIUM CHLORIDE 125 MG: 9 INJECTION, SOLUTION INTRAVENOUS at 08:44

## 2025-06-09 RX ADMIN — ONDANSETRON 4 MG: 4 TABLET, ORALLY DISINTEGRATING ORAL at 10:34

## 2025-06-09 RX ADMIN — COLESTIPOL HYDROCHLORIDE 2 G: 1 TABLET ORAL at 08:40

## 2025-06-09 RX ADMIN — POLYVINYL ALCOHOL 1 DROP: 1.4 SOLUTION/ DROPS OPHTHALMIC at 16:21

## 2025-06-09 RX ADMIN — SUCRALFATE 1 G: 1 TABLET ORAL at 11:37

## 2025-06-09 RX ADMIN — PANTOPRAZOLE SODIUM 40 MG: 40 TABLET, DELAYED RELEASE ORAL at 08:40

## 2025-06-09 RX ADMIN — SUCRALFATE 1 G: 1 TABLET ORAL at 05:35

## 2025-06-09 RX ADMIN — POLYVINYL ALCOHOL 1 DROP: 1.4 SOLUTION/ DROPS OPHTHALMIC at 08:41

## 2025-06-09 RX ADMIN — DONEPEZIL HYDROCHLORIDE 10 MG: 10 TABLET, FILM COATED ORAL at 05:35

## 2025-06-09 RX ADMIN — METOPROLOL SUCCINATE 25 MG: 25 TABLET, EXTENDED RELEASE ORAL at 08:39

## 2025-06-09 RX ADMIN — LEVOTHYROXINE SODIUM 75 MCG: 0.07 TABLET ORAL at 05:35

## 2025-06-09 RX ADMIN — BUSPIRONE HYDROCHLORIDE 15 MG: 10 TABLET ORAL at 08:39

## 2025-06-09 RX ADMIN — POLYVINYL ALCOHOL 1 DROP: 1.4 SOLUTION/ DROPS OPHTHALMIC at 11:37

## 2025-06-09 RX ADMIN — BUPROPION HYDROCHLORIDE 150 MG: 150 TABLET, FILM COATED, EXTENDED RELEASE ORAL at 08:39

## 2025-06-09 NOTE — PLAN OF CARE
Problem: ABCDS Injury Assessment  Goal: Absence of physical injury  Outcome: Progressing     Problem: Discharge Planning  Goal: Discharge to home or other facility with appropriate resources  Outcome: Progressing     Problem: Safety - Adult  Goal: Free from fall injury  Outcome: Progressing     Problem: Skin/Tissue Integrity  Goal: Skin integrity remains intact  Description: 1.  Monitor for areas of redness and/or skin breakdown2.  Assess vascular access sites hourly3.  Every 4-6 hours minimum:  Change oxygen saturation probe site4.  Every 4-6 hours:  If on nasal continuous positive airway pressure, respiratory therapy assess nares and determine need for appliance change or resting period  Outcome: Progressing     
  Problem: Chronic Conditions and Co-morbidities  Goal: Patient's chronic conditions and co-morbidity symptoms are monitored and maintained or improved  6/7/2025 0840 by Barby Ferrara RN  Outcome: Progressing  6/7/2025 0649 by Bandar Aguirre RN  Outcome: Progressing     Problem: Safety - Adult  Goal: Free from fall injury  6/7/2025 0840 by Barby Ferrara RN  Outcome: Progressing  6/7/2025 0649 by Bandar Aguirre RN  Outcome: Progressing     Problem: Skin/Tissue Integrity  Goal: Skin integrity remains intact  Description: 1.  Monitor for areas of redness and/or skin breakdown2.  Assess vascular access sites hourly3.  Every 4-6 hours minimum:  Change oxygen saturation probe site4.  Every 4-6 hours:  If on nasal continuous positive airway pressure, respiratory therapy assess nares and determine need for appliance change or resting period  Recent Flowsheet Documentation  Taken 6/6/2025 2115 by Bandar Aguirre RN  Skin Integrity Remains Intact: Monitor for areas of redness and/or skin breakdown     Problem: Pain  Goal: Verbalizes/displays adequate comfort level or baseline comfort level  6/7/2025 0840 by Barby Ferrara RN  Outcome: Progressing  6/7/2025 0649 by Bandar Aguirre RN  Outcome: Progressing  6/7/2025 0649 by Bandar Aguirre RN  Outcome: Progressing     
  Problem: Chronic Conditions and Co-morbidities  Goal: Patient's chronic conditions and co-morbidity symptoms are monitored and maintained or improved  Outcome: Progressing     Problem: Safety - Adult  Goal: Free from fall injury  Outcome: Progressing     Problem: Pain  Goal: Verbalizes/displays adequate comfort level or baseline comfort level  Outcome: Progressing     Problem: Discharge Planning  Goal: Discharge to home or other facility with appropriate resources  Outcome: Progressing     
  Problem: Chronic Conditions and Co-morbidities  Goal: Patient's chronic conditions and co-morbidity symptoms are monitored and maintained or improved  Outcome: Progressing     Problem: Safety - Adult  Goal: Free from fall injury  Outcome: Progressing     Problem: Skin/Tissue Integrity  Goal: Skin integrity remains intact  Description: 1.  Monitor for areas of redness and/or skin breakdown2.  Assess vascular access sites hourly3.  Every 4-6 hours minimum:  Change oxygen saturation probe site4.  Every 4-6 hours:  If on nasal continuous positive airway pressure, respiratory therapy assess nares and determine need for appliance change or resting period  Recent Flowsheet Documentation  Taken 6/6/2025 2115 by Bandar Aguirre, RN  Skin Integrity Remains Intact: Monitor for areas of redness and/or skin breakdown     Problem: Pain  Goal: Verbalizes/displays adequate comfort level or baseline comfort level  6/7/2025 0649 by Bandar Aguirre, RN  Outcome: Progressing     
  Problem: Discharge Planning  Goal: Discharge to home or other facility with appropriate resources  6/8/2025 0833 by Barby Ferrara RN  Outcome: Progressing  6/8/2025 0401 by Bandar Aguirre RN  Outcome: Progressing     Problem: Chronic Conditions and Co-morbidities  Goal: Patient's chronic conditions and co-morbidity symptoms are monitored and maintained or improved  6/8/2025 0833 by Barby Ferrara RN  Outcome: Progressing  6/8/2025 0401 by Bandar Aguirre RN  Outcome: Progressing     Problem: Safety - Adult  Goal: Free from fall injury  6/8/2025 0833 by Barby Ferrara RN  Outcome: Progressing  6/8/2025 0401 by Bandar Aguirre RN  Outcome: Progressing     Problem: Pain  Goal: Verbalizes/displays adequate comfort level or baseline comfort level  6/8/2025 0833 by Barby Ferrara RN  Outcome: Progressing  6/8/2025 0401 by Bandar Aguirre RN  Outcome: Progressing     
  Problem: Pain  Goal: Verbalizes/displays adequate comfort level or baseline comfort level  6/8/2025 2046 by Roxana Ac RN  Outcome: Progressing  6/8/2025 0833 by Barby Ferrara RN  Outcome: Progressing     Problem: Chronic Conditions and Co-morbidities  Goal: Patient's chronic conditions and co-morbidity symptoms are monitored and maintained or improved  6/8/2025 2046 by Roxana Ac RN  Outcome: Progressing  6/8/2025 0833 by Barby Ferrara RN  Outcome: Progressing     Problem: Safety - Adult  Goal: Free from fall injury  6/8/2025 2046 by Roxana Ac RN  Outcome: Progressing  6/8/2025 0833 by Barby Ferrara RN  Outcome: Progressing     Problem: Discharge Planning  Goal: Discharge to home or other facility with appropriate resources  6/8/2025 2046 by Roxana Ac RN  Outcome: Progressing  6/8/2025 0833 by Barby Ferrara RN  Outcome: Progressing     
nasal continuous positive airway pressure, respiratory therapy assess nares and determine need for appliance change or resting period  Recent Flowsheet Documentation  Taken 6/9/2025 1264 by Barby Escalante, RN  Skin Integrity Remains Intact: Monitor for areas of redness and/or skin breakdown

## 2025-06-09 NOTE — PROGRESS NOTES
4 Eyes Skin Assessment     NAME:  Cindy Wells  YOB: 1940  MEDICAL RECORD NUMBER:  [id number]    The patient is being assessed for  {Reason for Assessment:73128}    I agree that at least one RN has performed a thorough Head to Toe Skin Assessment on the patient. ALL assessment sites listed below have been assessed.      Areas assessed by both nurses:    Head, Face, Ears, Shoulders, Back, Chest, Arms, Elbows, Hands, Sacrum. Buttock, Coccyx, Ischium, and Legs. Feet and Heels        Does the Patient have a Wound? No noted wound(s)       Darrell Prevention initiated by RN: Yes  Wound Care Orders initiated by RN: No    Pressure Injury (Stage 3,4, Unstageable, DTI, NWPT, and Complex wounds) if present, place Wound referral order by RN under : No    New Ostomies, if present place, Ostomy referral order under : No     Nurse 1 eSignature: Electronically signed by Carmen Miller RN on 6/5/25 at 5:16 PM EDT    **SHARE this note so that the co-signing nurse can place an eSignature**    Nurse 2 eSignature: {Esignature:[id number]}     
6 minute walk test performed. Patient ambulated on RA and stayed above 97% at all times.  
Cindy Wells was ordered Colestid 1 gm which is a nonformulary medication.  This medication will need to be supplied by the patient as the pharmacy does not carry this non-formulary medication.    If the medication has not been administered by 1400 on the following day from the time the order was placed, a pharmacist will follow-up with the nurse of the patient to assess the capability of the patient to bring in the medication.    If it is determined that the patient cannot supply the medication and it is not available to be dispensed from the pharmacy, the provider will be notified.    
Database initiated. Patient is A&O comes in from home with daughter, she uses a cane and is RA at baseline.   
Dr. Staples notified of consult. Ashley Hutchison RN    
Dr. Staples notified of patient's BMP results.   
Internal Medicine Progress Note    Patient's name: Cindy Wells  : 1940  Chief complaints (on day of admission): Dehydration (Recent UTI)  Admission date: 2025  Date of service: 2025   Room: [address] INTERNAL MEDICINE   Primary care physician: Maryan Pink MD  Reason for visit: Follow-up for DANIEL    Subjective  Cindy was seen and examined laying back in the bed this morning. She is awake and alert, in no acute respiratory distress on room air. States that she is feeling slightly improved this morning but tired. Reports chronic diarrhea which is unchanged but no other complaints this morning. Creatinine continues to improve. No other reported issues this morning    Review of Systems  There are no new complaints of chest pain, shortness of breath, abdominal pain, nausea, vomiting, constipation.    Hospital Medications  Current Facility-Administered Medications   Medication Dose Route Frequency Provider Last Rate Last Admin    colestipol (COLESTID) tablet (Patient Supplied)   Oral BID Vinod Godfrey, DO   2 g at 25    ferric gluconate (FERRLECIT) 125 mg in sodium chloride 0.9 % 100 mL IVPB  125 mg IntraVENous Daily Marco Wall MD   Stopped at 25 0910    lactated ringers infusion   IntraVENous Continuous Marco Wall MD 50 mL/hr at 25 2134 New Bag at 25 213    loperamide (IMODIUM) capsule 2 mg  2 mg Oral 4x Daily PRN Shane Gao, DO   2 mg at 25 1638    bismuth subsalicylate (PEPTO BISMOL) 262 MG/15ML suspension 30 mL  30 mL Oral Q6H PRN Shane Gao, DO        glucose chewable tablet 16 g  4 tablet Oral PRN Gurpreet Arevalo MD        dextrose bolus 10% 125 mL  125 mL IntraVENous PRN Gurpreet Arevalo MD        Or    dextrose bolus 10% 250 mL  250 mL IntraVENous PRN Gurpreet Arevalo MD        glucagon injection 1 mg  1 mg SubCUTAneous PRN Gurpreet Arevalo MD        dextrose 10 % infusion   IntraVENous Continuous PRN Gurpreet Arevalo MD        apixaban (ELIQUIS) tablet 
Internal Medicine Progress Note    Patient's name: Cindy Wells  : 1940  Chief complaints (on day of admission): Dehydration (Recent UTI)  Admission date: 2025  Date of service: 2025   Room: [address] INTERNAL MEDICINE   Primary care physician: Maryan Pink MD  Reason for visit: Follow-up for DANIEL    Subjective  Cindy was seen and examined laying in bed. She tells me she is feeling ok but that her back was hurting so she just took some tylenol. She denies other bothersome symptoms. IVF infusing.     Review of Systems  There are no new complaints of chest pain, shortness of breath, abdominal pain, nausea, vomiting, diarrhea, constipation.    Hospital Medications  Current Facility-Administered Medications   Medication Dose Route Frequency Provider Last Rate Last Admin    lactated ringers infusion   IntraVENous Continuous Rafiq Staples  mL/hr at 25 0106 New Bag at 25 0106    loperamide (IMODIUM) capsule 2 mg  2 mg Oral 4x Daily PRN Shane Gao DO   2 mg at 25 1445    bismuth subsalicylate (PEPTO BISMOL) 262 MG/15ML suspension 30 mL  30 mL Oral Q6H PRN Shane Gao DO        glucose chewable tablet 16 g  4 tablet Oral PRN Gurpreet Arevalo MD        dextrose bolus 10% 125 mL  125 mL IntraVENous PRN Gurpreet Arevalo MD        Or    dextrose bolus 10% 250 mL  250 mL IntraVENous PRN Gurpreet Arevalo MD        glucagon injection 1 mg  1 mg SubCUTAneous PRN Gurpreet Arevalo MD        dextrose 10 % infusion   IntraVENous Continuous PRN Gurpreet Arevalo MD        apixaban (ELIQUIS) tablet 2.5 mg  2.5 mg Oral BID Gurpreet Arevalo MD   2.5 mg at 25    atorvastatin (LIPITOR) tablet 40 mg  40 mg Oral Nightly Gurpreet Arevalo MD   40 mg at 25    buPROPion (WELLBUTRIN SR) extended release tablet 150 mg  150 mg Oral Q12H Gurpreet Arevalo MD   150 mg at 25    busPIRone (BUSPAR) tablet 15 mg  15 mg Oral BID Gurpreet Arevalo MD   15 mg at 25 6189    polyvinyl 
New bmp results messaged to Dr Staples  
Patient states she sees Dr. Staples as an outpatient, we will defer this consult to him, thank you!    Electronically signed by CRISTIAN Rogers CNP on 6/5/2025 at 3:11 PM   
Patient's daughter brought in colestipol, sent to pharmacy for verification.  
Physical Therapy  Facility/Department: [address] INTERNAL MEDICINE 2  Physical Therapy Initial Assessment    Name: Cindy Wells  : 1940  MRN: [id number]  Date of Service: 2025        Patient Diagnosis(es): The primary encounter diagnosis was Acute renal failure, unspecified acute renal failure type. A diagnosis of Hyperkalemia was also pertinent to this visit.  Past Medical History:  has a past medical history of Anxiety, Cardiomyopathy (HCC), CHF (congestive heart failure) (HCC), CKD (chronic kidney disease) stage 3, GFR 30-59 ml/min (HCC), Cognitive dysfunction, Depression, History of TIA (transient ischemic attack), Hyperlipidemia, Hypertension, and Thyroid disease.  Past Surgical History:  has a past surgical history that includes Thyroid lobectomy; knee surgery; ERCP (N/A, 9/15/2020); Cholecystectomy, laparoscopic (N/A, 2020); Hysterectomy; Tonsillectomy; and ERCP (N/A, 10/21/2020).      Evaluating Therapist: Dianelys Velarde PT    Room #:  0403/0403-A  Diagnosis:  DANIEL (acute kidney injury) [N17.9]  PMHx/PSHx:  Cardiomyopathy, CHF, CKD, TIA  Precautions:  falls, alarm      Social:  Pt lives with daughter in a 2 floor plan bedroom and bathroom second floor. Pt ambulates with cane     Initial Evaluation  Date: 25 Treatment      Short Term/ Long Term   Goals   Was pt agreeable to Eval/treatment? yes     Does pt have pain? None reported     Bed Mobility  Rolling: SBA  Supine to sit: SBA  Sit to supine: SBA  Scooting: SBA  independent   Transfers Sit to stand: CGA  Stand to sit: CGA  Stand pivot: CGA  independent   Ambulation    120 feet with s cane with hand held/min assist  200 feet with AAD with supervision   Stair Negotiation  Ascended and descended  NT   12 steps with 1 rail with SBA   LE strength     3+/5    4/5   balance      fair     AM-PAC Raw score               17         Pt is alert and grossly Oriented   LE ROM: WFL  Sensation: intact  Edema: B LEs  Endurance: fair+   
imaging done because pt was having imbalance    Hyperlipidemia     Hypertension     Thyroid disease        Past Surgical History:   Procedure Laterality Date    CHOLECYSTECTOMY, LAPAROSCOPIC N/A 9/16/2020    LAPAROSCOPIC CHOLECYSTECTOMY performed by John Porter MD at [facility] OR    ERCP N/A 9/15/2020    ERCP STENT INSERTION with BALLOON SWEEPING and PAPILLOTOMY performed by oMlly Augustine MD at [facility] ENDOSCOPY    ERCP N/A 10/21/2020    ERCP STENT REMOVAL performed by Molly Augustine MD at [facility] ENDOSCOPY    HYSTERECTOMY (CERVIX STATUS UNKNOWN)      KNEE SURGERY      THYROID LOBECTOMY      TONSILLECTOMY         No family history on file.     reports that she quit smoking about 18 years ago. Her smoking use included cigarettes. She started smoking about 58 years ago. She has a 40 pack-year smoking history. She has never used smokeless tobacco. She reports that she does not currently use alcohol. She reports that she does not use drugs.    Allergies:  Macrobid [nitrofurantoin monohyd macro], Iodinated contrast media, Namenda [memantine], Cipro [ciprofloxacin hcl], and Pcn [penicillins]    Current Medications:    colestipol (COLESTID) tablet (Patient Supplied), BID  ferric gluconate (FERRLECIT) 125 mg in sodium chloride 0.9 % 100 mL IVPB, Daily  lactated ringers infusion, Continuous  loperamide (IMODIUM) capsule 2 mg, 4x Daily PRN  bismuth subsalicylate (PEPTO BISMOL) 262 MG/15ML suspension 30 mL, Q6H PRN  glucose chewable tablet 16 g, PRN  dextrose bolus 10% 125 mL, PRN   Or  dextrose bolus 10% 250 mL, PRN  glucagon injection 1 mg, PRN  dextrose 10 % infusion, Continuous PRN  apixaban (ELIQUIS) tablet 2.5 mg, BID  atorvastatin (LIPITOR) tablet 40 mg, Nightly  buPROPion (WELLBUTRIN SR) extended release tablet 150 mg, Q12H  busPIRone (BUSPAR) tablet 15 mg, BID  polyvinyl alcohol (LIQUIFILM TEARS) 1.4 % ophthalmic solution 1 drop, 4x Daily  cetirizine (ZYRTEC) tablet 5 mg, Daily PRN  donepezil (ARICEPT) tablet 10 mg, 
        Assessment   Active Hospital Problems    Diagnosis     Essential hypertension [I10]      Priority: Low    Hypothyroidism [E03.9]      Priority: Low    DANIEL (acute kidney injury) [N17.9]     Atrial fibrillation (HCC) [I48.91]     CKD (chronic kidney disease) stage 3, GFR 30-59 ml/min ([facility]) [N18.30]          Plan  Continue fluids for IV hydration- plans to stop today per nephrology  Nephrology consulted, appreciate recommendations  Initial creatinine 2.2 with a baseline of 1.3.  Creatinine has improved to 1.4 today. Continue to monitor with BMPs  Initial potassium was 6.1. Protocol given.  Hyperkalemia has resolved. Monitor labs.  Metabolic acidosis secondary to acute kidney injury.  Initial bicarb of 14. IVF per nephrology  Patient is no longer having any urinary symptoms  Renal US showing atrophic kidneys.  Appreciate nephrology input.  IV ferrlicit per nephrology    Follow labs   DVT prophylaxis  Please see orders for further management and care.  Discharge plan: home once renal function normalized and ok with nephrology    Pertinent details discussed with Dr. Gao.    Electronically signed by CRISTIAN Valadez CNP on 6/8/2025 at 7:02 AM    I can be reached through [employer].    
treatment indicated to increase patient's safety and independence with completion of ADL/IADL tasks in order to maximize patient's functional independence and quality of life.    Rehab Potential: Good for established goals.  Patient / Family Goal: Patient wants to return to her PLOF and home environment.  Patient and/or family were instructed on functional diagnosis, prognosis/goals, and OT plan of care. Demonstrated Fair understanding.    Eval Complexity: Low    Time In: 1115  Time Out: 1130  Total Treatment Time: 0 minutes      Minutes Units   OT Eval Low 23765 15 1   OT Eval Medium 05489     OT Eval High 17706     OT Re-Eval 51390     Therapeutic Ex 45940     Therapeutic Activities 80384     ADL/Self Care 69735     Orthotic Management 12374     Neuro Re-Ed 50046     Non-Billable Time N/A ---     Evaluation time includes thorough review of current medical information, gathering information on past medical history/social history and prior level of function, completion of standardized testing/informal observation of tasks, assessment of data, and education on plan of care and goals.    [facility] [place]/L  License Number: [id number]      
very tired and returned to bed at the end of the session.   notified of pt request for w/w.     Education/treatment:  ADL retraining with facilitation of movement to increase self care skills. Therapeutic activity to address balance and endurance for ADL and transfers.  Pt education of walker safety, transfer safety, energy conservation.       Pt has made  progress towards set goals.       Time In: 135  Time Out: 200     Min Units   Therapeutic Ex 67312     Therapeutic Activities 07111 15 1   ADL/Self Care 26667 10 1   Orthotic Management 76591     Neuro Re-Ed 12791     Non-Billable Time     TOTAL TIMED TREATMENT 25 2         [place] [facility]/L [address]    
01/21/2022 03:17 AM    GLUCOSEU NEGATIVE 05/07/2024 09:30 PM     Microalbumen/Creatinine ratio:  No components found for: \"RUCREAT\"  Iron Saturation:  No components found for: \"PERCENTFE\"  TIBC:    Lab Results   Component Value Date/Time    TIBC 208 06/07/2025 04:30 AM     FERRITIN:    Lab Results   Component Value Date/Time    FERRITIN 33 06/07/2025 04:30 AM        Imaging:  No orders to display       Assessment  85-year-old woman admitted with fatigue malaise anorexia and poor intake, mild persistent nausea, recent history of diarrhea though that seems to have resolved.  Baseline creatinine 1.3 to 1.5 mg/dL.  Creatinine presentation 2.2, improving to 1.8 as of this morning.  Status post 2 L normal saline bolus, and NS at 100 cc/h    Acute kidney injury, secondary to prerenal azotemia improved with IV fluid resuscitation  Chronic kidney disease stage IIIb, baseline creatinine 1.3 to 1.5 mg/dL  Anemia, likely at least in part secondary to CKD, the need to rule out occult GI bleed she is normocytic.    Plan    Azotemia improving   peak cr 2.2   Cr down to 1.3 (baseline)  No proteinuria  Tsat <10  US kidney no hydronephrosis    Stop IV fluid  Iv ferrlicit 125 mcg daily x 3 doses  6-minute walking test discussed with RN  4    Encourage oral intake as able  5.   Blood work next week,Follow-up in office in 2 weeks  Discussed with RN          Electronically signed by Marco Wall MD on 6/9/2025 at 3:24 PM    NOTE: This report was transcribed using voice recognition software. Every effort was made to ensure accuracy; however, inadvertent transcription errors may be present.  
TIBC 208 06/07/2025 04:30 AM     FERRITIN:    Lab Results   Component Value Date/Time    FERRITIN 33 06/07/2025 04:30 AM        Imaging:  No orders to display       Assessment  85-year-old woman admitted with fatigue malaise anorexia and poor intake, mild persistent nausea, recent history of diarrhea though that seems to have resolved.  Baseline creatinine 1.3 to 1.5 mg/dL.  Creatinine presentation 2.2, improving to 1.8 as of this morning.  Status post 2 L normal saline bolus, and NS at 100 cc/h    Acute kidney injury, secondary to prerenal azotemia improved with IV fluid resuscitation  Chronic kidney disease stage IIIb, baseline creatinine 1.3 to 1.5 mg/dL  Anemia, likely at least in part secondary to CKD, the need to rule out occult GI bleed she is normocytic.    Plan    Azotemia improving peak cr 2.2   Cr down to 1.5   No proteinuria  Tsat <10  US kidney no hydronephrosis    Continue IV fluid resuscitation LR at 50 cc/hr till am   Iv ferrlicit 125 mcg daily x 3 doses  Encourage oral intake as able  4.   Am labs            Electronically signed by Marco Wall MD on 6/7/2025 at 12:09 PM    NOTE: This report was transcribed using voice recognition software. Every effort was made to ensure accuracy; however, inadvertent transcription errors may be present.

## 2025-06-09 NOTE — DISCHARGE SUMMARY
[phone]   colestipol 1 g tablet           INTERNAL MEDICINE INSTRUCTIONS:  Follow-up with primary care physician as directed in discharge paperwork.  Please review results of imaging studies with PCP.  Follow-up with consultants as directed by them.  If recurrence or worsening of symptoms go to the ED or call primary care physician.  Diet: ADULT DIET; Regular; Low Potassium (Less than 3000 mg/day)    FOLLOW-IP  Maryan Pink MD  [address] [facility] [address]  [phone]    Schedule an appointment as soon as possible for a visit in 1 week(s)  for follow-up appointment from this hospital stay    Rafiq Staples MD  [address] [address]  [phone]    Call  for follow-up appointment from this hospital stay ; follow up appointment in 2 weeks.    [facility]  [address] [address]  [phone]          Preparing for this patient's discharge, including paperwork, orders, instructions, and meeting with patient did required 35 minutes.    Electronically signed by Vinod Godfrey DO on 6/10/2025 at 8:33 AM

## 2025-06-20 ENCOUNTER — OFFICE VISIT (OUTPATIENT)
Dept: GERIATRIC MEDICINE | Age: 85
End: 2025-06-20
Payer: MEDICARE

## 2025-06-20 VITALS
HEART RATE: 62 BPM | OXYGEN SATURATION: 96 % | WEIGHT: 156.1 LBS | RESPIRATION RATE: 20 BRPM | DIASTOLIC BLOOD PRESSURE: 62 MMHG | SYSTOLIC BLOOD PRESSURE: 118 MMHG | TEMPERATURE: 98.1 F | BODY MASS INDEX: 26.79 KG/M2

## 2025-06-20 DIAGNOSIS — G31.84 MCI (MILD COGNITIVE IMPAIRMENT): Primary | ICD-10-CM

## 2025-06-20 PROCEDURE — 1036F TOBACCO NON-USER: CPT | Performed by: INTERNAL MEDICINE

## 2025-06-20 PROCEDURE — G8427 DOCREV CUR MEDS BY ELIG CLIN: HCPCS | Performed by: INTERNAL MEDICINE

## 2025-06-20 PROCEDURE — 1123F ACP DISCUSS/DSCN MKR DOCD: CPT | Performed by: INTERNAL MEDICINE

## 2025-06-20 PROCEDURE — 1160F RVW MEDS BY RX/DR IN RCRD: CPT | Performed by: INTERNAL MEDICINE

## 2025-06-20 PROCEDURE — 1159F MED LIST DOCD IN RCRD: CPT | Performed by: INTERNAL MEDICINE

## 2025-06-20 PROCEDURE — 1111F DSCHRG MED/CURRENT MED MERGE: CPT | Performed by: INTERNAL MEDICINE

## 2025-06-20 PROCEDURE — 1090F PRES/ABSN URINE INCON ASSESS: CPT | Performed by: INTERNAL MEDICINE

## 2025-06-20 PROCEDURE — 99213 OFFICE O/P EST LOW 20 MIN: CPT | Performed by: INTERNAL MEDICINE

## 2025-06-20 PROCEDURE — G8419 CALC BMI OUT NRM PARAM NOF/U: HCPCS | Performed by: INTERNAL MEDICINE

## 2025-06-23 RX ORDER — DONEPEZIL HYDROCHLORIDE 5 MG/1
5 TABLET, FILM COATED ORAL DAILY
Qty: 90 TABLET | Refills: 3 | Status: SHIPPED | OUTPATIENT
Start: 2025-06-23

## 2025-07-25 RX ORDER — DONEPEZIL HYDROCHLORIDE 10 MG/1
TABLET, FILM COATED ORAL
Qty: 100 TABLET | Refills: 2 | Status: SHIPPED | OUTPATIENT
Start: 2025-07-25

## 2025-07-25 RX ORDER — DONEPEZIL HYDROCHLORIDE 5 MG/1
TABLET, FILM COATED ORAL
Qty: 100 TABLET | Refills: 2 | Status: SHIPPED | OUTPATIENT
Start: 2025-07-25

## (undated) DEVICE — SPONGE GZ W4XL4IN RAYON POLY FILL CVR W/ NONWOVEN FAB

## (undated) DEVICE — AGENT HEMSTAT W2XL4IN OXIDIZED REGENERATED CELOS ABSRB

## (undated) DEVICE — INSUFFLATION NEEDLE TO ESTABLISH PNEUMOPERITONEUM.: Brand: INSUFFLATION NEEDLE

## (undated) DEVICE — DRAPE,CHEST,FENES,15X10,STERIL: Brand: MEDLINE

## (undated) DEVICE — SYRINGE 20ML LL S/C 50

## (undated) DEVICE — NEEDLE HYPO 22GA L1.5IN BLK POLYPR HUB S STL REG BVL STR

## (undated) DEVICE — PACK PROCEDURE SURG GEN CUST

## (undated) DEVICE — ADHESIVE SKIN CLSR 0.7ML TOP DERMBND ADV

## (undated) DEVICE — NEEDLE HYPO 18GA L1.5IN PNK POLYPR HUB S STL REG BVL STR

## (undated) DEVICE — TOWEL,OR,DSP,ST,BLUE,STD,6/PK,12PK/CS: Brand: MEDLINE

## (undated) DEVICE — 4-PORT MANIFOLD: Brand: NEPTUNE 2

## (undated) DEVICE — SOLUTION IV IRRIG POUR BRL 0.9% SODIUM CHL 2F7124

## (undated) DEVICE — GLOVE ORANGE PI 7 1/2   MSG9075

## (undated) DEVICE — PUMP SUC IRR TBNG L10FT W/ HNDPC ASSEMB STRYKEFLOW 2

## (undated) DEVICE — APPLIER CLP M L L11.4IN DIA10MM ENDOSCP ROT MULT FOR LIG

## (undated) DEVICE — INSUFFLATION TUBING SET WITH FILTER, FUNNEL CONNECTOR AND LUER LOCK: Brand: JOSNOE MEDICAL INC

## (undated) DEVICE — GRADUATE TRIANG MEASURE 1000ML BLK PRNT

## (undated) DEVICE — SYSTEM BX CAP BILI RAP EXCHG CAP LOK DEV COMPATIBLE W/ OLY

## (undated) DEVICE — INSTRUMENT CLAMP TOWEL LARGE REUSABLE

## (undated) DEVICE — BLOCK BITE 60FR RUBBER ADLT DENTAL

## (undated) DEVICE — CAMERA STRYKER 1488

## (undated) DEVICE — INTENDED FOR TISSUE SEPARATION, AND OTHER PROCEDURES THAT REQUIRE A SHARP SURGICAL BLADE TO PUNCTURE OR CUT.: Brand: BARD-PARKER ® STAINLESS STEEL BLADES

## (undated) DEVICE — GOWN,SIRUS,FABRNF,XL,20/CS: Brand: MEDLINE

## (undated) DEVICE — RETRIEVAL BALLOON CATHETER: Brand: EXTRACTOR™ PRO RX

## (undated) DEVICE — MEDIA CONTRAST ISOVUE  300 10X50ML

## (undated) DEVICE — SYRINGE, LUER LOCK, 10ML: Brand: MEDLINE

## (undated) DEVICE — ELECTRODE PT RET AD L9FT HI MOIST COND ADH HYDRGEL CORDED

## (undated) DEVICE — SOLUTION IRRIG 2000ML 0.9% SOD CHL USP UROMATIC PLAS CONT

## (undated) DEVICE — TROCAR: Brand: KII® SLEEVE

## (undated) DEVICE — SET INSTRUMENT LAP I

## (undated) DEVICE — CHLORAPREP 26ML ORANGE

## (undated) DEVICE — SCISSORS ENDOSCP DIA5MM CRV MPLR CAUT W/ RATCH HNDL

## (undated) DEVICE — TROCAR: Brand: KII SHIELDED BLADED ACCESS SYSTEM

## (undated) DEVICE — DOUBLE BASIN SET: Brand: MEDLINE INDUSTRIES, INC.

## (undated) DEVICE — SPHINCTEROTOME: Brand: DREAMTOME™ RX 44

## (undated) DEVICE — SET LAPAROSCOPIC HERNIA

## (undated) DEVICE — KIT,ANTI FOG,W/SPONGE & FLUID,SOFT PACK: Brand: MEDLINE